# Patient Record
Sex: FEMALE | Race: BLACK OR AFRICAN AMERICAN | Employment: OTHER | URBAN - METROPOLITAN AREA
[De-identification: names, ages, dates, MRNs, and addresses within clinical notes are randomized per-mention and may not be internally consistent; named-entity substitution may affect disease eponyms.]

---

## 2017-04-28 ENCOUNTER — OFFICE VISIT (OUTPATIENT)
Dept: RHEUMATOLOGY | Age: 67
End: 2017-04-28

## 2017-04-28 VITALS
DIASTOLIC BLOOD PRESSURE: 78 MMHG | TEMPERATURE: 98.2 F | HEIGHT: 63 IN | RESPIRATION RATE: 14 BRPM | OXYGEN SATURATION: 99 % | SYSTOLIC BLOOD PRESSURE: 125 MMHG | BODY MASS INDEX: 28 KG/M2 | WEIGHT: 158 LBS | HEART RATE: 66 BPM

## 2017-04-28 DIAGNOSIS — M85.80 OSTEOPENIA, UNSPECIFIED LOCATION: Primary | ICD-10-CM

## 2017-04-28 DIAGNOSIS — M17.0 PRIMARY OSTEOARTHRITIS OF BOTH KNEES: ICD-10-CM

## 2017-04-28 NOTE — MR AVS SNAPSHOT
Visit Information Date & Time Provider Department Dept. Phone Encounter #  
 4/28/2017  1:00 PM Nayana Villatoro MD [facility] [id number] Follow-up Instructions Return in about 1 year (around 4/28/2018). Upcoming Health Maintenance Date Due Hepatitis C Screening 1950 DTaP/Tdap/Td series (1 - Tdap) 10/24/1973 PAP AKA CERVICAL CYTOLOGY 10/24/1973 FOBT Q 1 YEAR AGE 50-75 10/24/2002 ZOSTER VACCINE AGE 60> 10/24/2012 BREAST CANCER SCRN MAMMOGRAM 6/19/2017 Allergies as of 4/28/2017  Review Complete On: 4/28/2017 By: Nayana Villatoro MD  
  
 Severity Noted Reaction Type Reactions Lisinopril High 10/22/2010    Swelling, Cough Lips swell Pcn [Penicillins] High 10/22/2010    Hives Hydrochlorothiazide  10/22/2010    Unknown (comments) NA down, fatigue , myalgia Current Immunizations  Reviewed on 4/28/2017 Name Date Influenza Vaccine (Quad) PF 10/31/2016 Influenza Vaccine Split 10/8/2012  1:09 PM  
 Pneumococcal Vaccine (Unspecified Type) 10/8/2012  1:10 PM  
  
 Reviewed by Annetta Romero LPN on 0/72/9751 at  1:02 PM  
You Were Diagnosed With   
  
 Codes Comments Osteopenia, unspecified location    -  Primary ICD-10-CM: M85.80 ICD-9-CM: 733.90 Primary osteoarthritis of both knees     ICD-10-CM: M17.0 ICD-9-CM: 715.16 Vitals BP Pulse Temp Resp Height(growth percentile) Weight(growth percentile) 125/78 (BP 1 Location: Right arm, BP Patient Position: Sitting) 66 98.2 °F (36.8 °C) (Oral) 14 5' 3\" (1.6 m) 158 lb (71.7 kg) SpO2 BMI OB Status Smoking Status 99% 27.99 kg/m2 Postmenopausal Former Smoker BMI and BSA Data Body Mass Index Body Surface Area  
 27.99 kg/m 2 1.79 m 2 Preferred Pharmacy Pharmacy Name Phone [facility] [address] [phone] Your Updated Medication List  
  
   
 This list is accurate as of: 4/28/17  1:19 PM.  Always use your most recent med list.  
  
  
  
  
 albuterol 90 mcg/actuation inhaler Commonly known as:  PROVENTIL HFA, VENTOLIN HFA, PROAIR HFA Take 2 Puffs by inhalation every four (4) hours as needed for Wheezing or Shortness of Breath. alendronate 70 mg tablet Commonly known as:  FOSAMAX Take 1 Tab by mouth every seven (7) days. ATACAND 16 mg tablet Generic drug:  candesartan Take  by mouth daily. atenolol 50 mg tablet Commonly known as:  TENORMIN Take 50 mg by mouth two (2) times a day. calcium carbonate-vitamin D3 1,000 mg(2,500 mg)-800 unit Tab Take  by mouth daily. multivitamin tablet Commonly known as:  ONE A DAY Take 1 Tab by mouth daily. NORVASC 5 mg tablet Generic drug:  amLODIPine Take 5 mg by mouth daily. Follow-up Instructions Return in about 1 year (around 4/28/2018). Patient Instructions Continue alendronate 70 mg weekly Bone density next year Vitamin D3 1162-6667 units daily Introducing [facility]! [employer] introduces [employer] patient portal. Now you can access parts of your medical record, email your doctor's office, and request medication refills online. 1. In your internet browser, go to [email or web address] 2. Click on the First Time User? Click Here link in the Sign In box. You will see the New Member Sign Up page. 3. Enter your [employer] Access Code exactly as it appears below. You will not need to use this code after youve completed the sign-up process. If you do not sign up before the expiration date, you must request a new code. · [employer] Access Code: [email or web address] Expires: 7/27/2017  1:19 PM 
 
4. Enter the last four digits of your Social Security Number (xxxx) and Date of Birth (mm/dd/yyyy) as indicated and click Submit. You will be taken to the next sign-up page. 5. Create a [employer] ID. This will be your [employer] login ID and cannot be changed, so think of one that is secure and easy to remember. 6. Create a [employer] password. You can change your password at any time. 7. Enter your Password Reset Question and Answer. This can be used at a later time if you forget your password. 8. Enter your e-mail address. You will receive e-mail notification when new information is available in [address] 9. Click Sign Up. You can now view and download portions of your medical record. 10. Click the Download Summary menu link to download a portable copy of your medical information. If you have questions, please visit the Frequently Asked Questions section of the [employer] website. Remember, [employer] is NOT to be used for urgent needs. For medical emergencies, dial 911. Now available from your iPhone and Android! Please provide this summary of care documentation to your next provider. Your primary care clinician is listed as ZOË Saul. If you have any questions after today's visit, please call [phone].

## 2017-04-28 NOTE — PROGRESS NOTES
HISTORY OF PRESENT ILLNESS  Mitch Cat is a 61 y.o. female. HPI  Patient presents for follow up of arthritis and osteopenia. She has occasional right knee pain with walking. She is walking or using an exercise bicycle on most days. She uses a cane if needed for gait stability. She has some swelling of the knees towards day's end. . She has AM stiffness of a few minutes. She has not taken Celebrex since last visit. She takes Tylenol sparingly if needed. She has been taking alendronate 70 mg as directed without difficulty. She is taking calcium and D supplement, as well as a multivitamin, on most days. Current Outpatient Prescriptions   Medication Sig Dispense Refill    alendronate (FOSAMAX) 70 mg tablet Take 1 Tab by mouth every seven (7) days. 13 Tab 3    multivitamin (ONE A DAY) tablet Take 1 Tab by mouth daily.  calcium carbonate-vitamin D3 1,000 mg(2,500 mg)-800 unit Tab Take  by mouth daily.  atenolol (TENORMIN) 50 mg tablet Take 50 mg by mouth two (2) times a day.  candesartan (ATACAND) 16 mg tablet Take  by mouth daily.  amlodipine (NORVASC) 5 mg tablet Take 5 mg by mouth daily.  albuterol (PROVENTIL HFA, VENTOLIN HFA, PROAIR HFA) 90 mcg/actuation inhaler Take 2 Puffs by inhalation every four (4) hours as needed for Wheezing or Shortness of Breath. Allergies   Allergen Reactions    Lisinopril Swelling and Cough     Lips swell    Pcn [Penicillins] Hives    Hydrochlorothiazide Unknown (comments)     NA down, fatigue , myalgia       Review of Systems   Constitutional: Negative for fever. Cardiovascular: Negative for leg swelling. Gastrointestinal: Negative for abdominal pain and nausea. Skin: Negative for rash. Physical Exam   Vitals reviewed. GENERAL: well developed, well nourished, in no apparent distress;    O/P: clear; no areas exposed jaw bone.     CARDIOVASCULAR: no edema;    GASTROINTESTINAL: nontender; no organomegaly    MUSCULOSKELETAL: digits/nails: squaring, subluxation left first Aia 16.; squaring right first Aia 16. Each knee 100 degrees flexion. No synovitis or joint tenderness   SKIN: no rashes   PSYCHIATRIC: mental status: alert and oriented x 3; good insight and judgement;      Lab Results   Component Value Date/Time    WBC 6.4 10/31/2016 12:23 PM    HGB 12.0 10/31/2016 12:23 PM    HCT 37.2 10/31/2016 12:23 PM    PLATELET 623 63/91/5188 12:23 PM    MCV 87 10/31/2016 12:23 PM     Lab Results   Component Value Date/Time    Sodium 135 10/31/2016 12:23 PM    Potassium 4.4 10/31/2016 12:23 PM    Chloride 94 10/31/2016 12:23 PM    CO2 24 10/31/2016 12:23 PM    Anion gap 5 08/27/2016 03:15 AM    Glucose 90 10/31/2016 12:23 PM    BUN 14 10/31/2016 12:23 PM    Creatinine 0.93 10/31/2016 12:23 PM    BUN/Creatinine ratio 15 10/31/2016 12:23 PM    GFR est AA 75 10/31/2016 12:23 PM    GFR est non-AA 65 10/31/2016 12:23 PM    Calcium 9.9 10/31/2016 12:23 PM    Bilirubin, total 0.3 10/31/2016 12:23 PM    AST (SGOT) 16 10/31/2016 12:23 PM    Alk. phosphatase 99 10/31/2016 12:23 PM    Protein, total 7.3 10/31/2016 12:23 PM    Albumin 4.2 10/31/2016 12:23 PM    A-G Ratio 1.4 10/31/2016 12:23 PM    ALT (SGPT) 8 10/31/2016 12:23 PM         ASSESSMENT and PLAN    ICD-10-CM ICD-9-CM    1. Osteopenia: DXA in February 2013 with T-score femoral neck -2.3; DXA May 2015 with femoral neck -2.0: significant improvement in BMD noted between 2013 and May. No clear clinical fracture history. 25-D 15 at baseline. Currently taking a calcium and D supplement with a multivitamin daily. She has been taking and tolerating alendronate 70 mg weekly since 6/2013.   M85.80 733.90 -alendronate 70 mg weekly  -DXA next year  -calcium 1200 mg daily total  -vitamin D3 6287-7767 units daily   2. Primary osteoarthritis of both knees: regular and generally comfortable exercise noted. No significant use of Tylenol recently.  Has not needed Celebrex M17.0 715.16 -comfortable, low impact exercise encouraged  -weight loss encouraged and discussed

## 2017-10-03 RX ORDER — ALENDRONATE SODIUM 70 MG/1
70 TABLET ORAL
Qty: 13 TAB | Refills: 3 | Status: SHIPPED | OUTPATIENT
Start: 2017-10-03 | End: 2017-10-03 | Stop reason: SDUPTHER

## 2017-10-03 RX ORDER — ALENDRONATE SODIUM 70 MG/1
70 TABLET ORAL
Qty: 13 TAB | Refills: 3 | Status: SHIPPED | OUTPATIENT
Start: 2017-10-03 | End: 2019-05-28 | Stop reason: ALTCHOICE

## 2018-05-23 ENCOUNTER — HOSPITAL ENCOUNTER (OUTPATIENT)
Dept: LAB | Age: 68
Discharge: HOME | End: 2018-05-23
Payer: MEDICARE

## 2018-05-23 ENCOUNTER — OFFICE VISIT (OUTPATIENT)
Dept: FAMILY MEDICINE CLINIC | Age: 68
End: 2018-05-23

## 2018-05-23 VITALS
HEART RATE: 60 BPM | OXYGEN SATURATION: 98 % | TEMPERATURE: 97.9 F | DIASTOLIC BLOOD PRESSURE: 82 MMHG | RESPIRATION RATE: 18 BRPM | BODY MASS INDEX: 28.07 KG/M2 | SYSTOLIC BLOOD PRESSURE: 129 MMHG | WEIGHT: 158.4 LBS | HEIGHT: 63 IN

## 2018-05-23 DIAGNOSIS — Z12.39 BREAST CANCER SCREENING: ICD-10-CM

## 2018-05-23 DIAGNOSIS — D86.9 SARCOIDOSIS: ICD-10-CM

## 2018-05-23 DIAGNOSIS — Z76.89 ENCOUNTER TO ESTABLISH CARE: Primary | ICD-10-CM

## 2018-05-23 DIAGNOSIS — I10 ESSENTIAL HYPERTENSION: ICD-10-CM

## 2018-05-23 DIAGNOSIS — M85.80 OSTEOPENIA, UNSPECIFIED LOCATION: ICD-10-CM

## 2018-05-23 DIAGNOSIS — Z11.59 NEED FOR HEPATITIS C SCREENING TEST: ICD-10-CM

## 2018-05-23 LAB — HBA1C MFR BLD HPLC: 5.3 %

## 2018-05-23 PROCEDURE — 80053 COMPREHEN METABOLIC PANEL: CPT

## 2018-05-23 PROCEDURE — 36415 COLL VENOUS BLD VENIPUNCTURE: CPT

## 2018-05-23 PROCEDURE — 85025 COMPLETE CBC W/AUTO DIFF WBC: CPT

## 2018-05-23 PROCEDURE — 80061 LIPID PANEL: CPT

## 2018-05-23 PROCEDURE — 84443 ASSAY THYROID STIM HORMONE: CPT

## 2018-05-23 PROCEDURE — 86803 HEPATITIS C AB TEST: CPT

## 2018-05-23 RX ORDER — CANDESARTAN 16 MG/1
16 TABLET ORAL DAILY
Qty: 90 TAB | Refills: 2 | Status: SHIPPED | OUTPATIENT
Start: 2018-05-23 | End: 2019-02-22 | Stop reason: SDUPTHER

## 2018-05-23 RX ORDER — ATENOLOL 50 MG/1
50 TABLET ORAL DAILY
Qty: 90 TAB | Refills: 2 | Status: SHIPPED | OUTPATIENT
Start: 2018-05-23 | End: 2019-02-22 | Stop reason: SDUPTHER

## 2018-05-23 RX ORDER — AMLODIPINE BESYLATE 5 MG/1
5 TABLET ORAL DAILY
Qty: 90 TAB | Refills: 2 | Status: SHIPPED | OUTPATIENT
Start: 2018-05-23 | End: 2019-02-22 | Stop reason: SDUPTHER

## 2018-05-23 NOTE — PATIENT INSTRUCTIONS

## 2018-05-23 NOTE — PROGRESS NOTES
Chief Complaint   Patient presents with   [facility] Establish Care    Medication Refill       Pt overdue for wellness exam.    Pt stated she had an eye exam at Dr. Francisca Rojas february 2017. NADEEM signed. Pt states she Is due for mammogram in July 2018. Pt stated that she was going to be taken off fosamax. May require another bone density scan per pt.

## 2018-05-23 NOTE — PROGRESS NOTES
HISTORY OF PRESENT ILLNESS  Rakel Colin is a 74 y.o. female. HPI  Patient comes in today to establish care. Previous PCP - Evelyn Denton for 25 years.  was in [facility]. Hx pulmonary sarcoid. Was seeing Dr. Towana Koyanagi (pulmonary doctor). Saw him last year in September 2017. Would see every 6 months. Would have PFTs once yearly. Needs new pulmonologist.  Hx of osteopenia, Taking Fosamax, needed to follow up with Dr. Nguyễn Gage but was told he is out of office indefinitely. Needs updated DEXA scan. GYN provider. Was told she no longer needed paps after 2015. Patient had abnormal pap in 1980s. Colonoscopy in 2009 with Dr. Adam Vital. .  Does not recall results, recalls being told 10 year follow up  Hx HTN, states compliance with medications. Tries to follow low sodium diet. Denies chest pains, palpitations, dizziness, dyspnea, tingling in extremities. Urinating without difficulty. Allergies   Allergen Reactions    Lisinopril Swelling and Cough     Lips swell    Pcn [Penicillins] Hives    Hydrochlorothiazide Unknown (comments)     NA down, fatigue , myalgia       Past Medical History:   Diagnosis Date    Arthritis     OA    COPD     d/t sarcoidosis    Hypertension     Hyponatremia 2009    FOLLOWING TREATMENT WITH HCTZ; NOW RESOLVED 9/24/12    Osteopenia     Sarcoidosis     nonsymptomatic       Past Surgical History:   Procedure Laterality Date    BONE MARROW ASPIRATE &BIOPSY  1979    SARCOIDOSIS DIAGNOSED    HX DILATION AND CURETTAGE  1992    HX HIP REPLACEMENT  2012    right    HX HIP REPLACEMENT Left 08/26/2016    HX ORTHOPAEDIC      excision right wrist cyst    HX OTHER SURGICAL  1979    BRONCHOSCOPY    HX TUBAL LIGATION  1988       Social History     Social History    Marital status:      Spouse name: N/A    Number of children: N/A    Years of education: N/A     Occupational History    Not on file.      Social History Main Topics    Smoking status: Former Smoker     Packs/day: 0.50     Years: 30.00     Quit date: 5/4/2012    Smokeless tobacco: Never Used    Alcohol use 4.2 - 6.0 oz/week     7 - 10 Cans of beer per week      Comment: 3-4 beers daily, may have 6 beers on weekends    Drug use: No    Sexual activity: No     Other Topics Concern    Not on file     Social History Narrative    Retired from [employer]. . 3 children, 2 sons, 1 daughter       Family History   Problem Relation Age of Onset   26 [facility] Arthritis-rheumatoid Mother     Hypertension Mother     Elevated Lipids Mother     Cancer Mother      bladder    Stroke Mother     Elevated Lipids Sister     Hypertension Sister     Diabetes Sister     Elevated Lipids Sister     Hypertension Sister     Other Son      SARCOIDOSIS, KERATOCONUS    Other Daughter      KERATOCONUS    Anesth Problems Neg Hx        Current Outpatient Prescriptions   Medication Sig    atenolol (TENORMIN) 50 mg tablet Take 1 Tab by mouth daily.  candesartan (ATACAND) 16 mg tablet Take 1 Tab by mouth daily.  amLODIPine (NORVASC) 5 mg tablet Take 1 Tab by mouth daily.  alendronate (FOSAMAX) 70 mg tablet Take 1 Tab by mouth every seven (7) days.  multivitamin (ONE A DAY) tablet Take 1 Tab by mouth daily.  calcium carbonate-vitamin D3 1,000 mg(2,500 mg)-800 unit Tab Take  by mouth daily.  albuterol (PROVENTIL HFA, VENTOLIN HFA, PROAIR HFA) 90 mcg/actuation inhaler Take 2 Puffs by inhalation every four (4) hours as needed for Wheezing or Shortness of Breath. No current facility-administered medications for this visit. Review of Systems   Constitutional: Negative for chills, diaphoresis, fever, malaise/fatigue and weight loss. HENT: Negative for congestion, ear pain, sore throat and tinnitus. Eyes: Negative for blurred vision and double vision. Respiratory: Negative for cough, sputum production, shortness of breath and wheezing. Cardiovascular: Negative for chest pain, palpitations and leg swelling. Gastrointestinal: Negative for abdominal pain, blood in stool, constipation, diarrhea, nausea and vomiting. Genitourinary: Negative for dysuria, flank pain, frequency, hematuria and urgency. Musculoskeletal: Negative for back pain, joint pain and myalgias. Skin: Negative. Neurological: Negative for dizziness, tingling, sensory change, speech change, focal weakness and headaches. Psychiatric/Behavioral: Negative for depression. The patient is not nervous/anxious and does not have insomnia. Vitals:    05/23/18 0937   BP: 129/82   Pulse: 60   Resp: 18   Temp: 97.9 °F (36.6 °C)   TempSrc: Oral   SpO2: 98%   Weight: 158 lb 6.4 oz (71.8 kg)   Height: 5' 3\" (1.6 m)     Physical Exam   Constitutional: She is oriented to person, place, and time. Vital signs are normal. She appears well-developed and well-nourished. She is cooperative. HENT:   Right Ear: Hearing, tympanic membrane, external ear and ear canal normal.   Left Ear: Hearing, tympanic membrane, external ear and ear canal normal.   Nose: Nose normal. Right sinus exhibits no maxillary sinus tenderness and no frontal sinus tenderness. Left sinus exhibits no maxillary sinus tenderness and no frontal sinus tenderness. Mouth/Throat: Uvula is midline, oropharynx is clear and moist and mucous membranes are normal. Mucous membranes are not pale and not dry. No oropharyngeal exudate, posterior oropharyngeal edema or posterior oropharyngeal erythema. Neck: No thyroid mass and no thyromegaly present. Cardiovascular: Normal rate, regular rhythm, S1 normal, S2 normal and normal heart sounds. No murmur heard. Pulses:       Radial pulses are 2+ on the right side, and 2+ on the left side. Dorsalis pedis pulses are 2+ on the right side, and 2+ on the left side. Posterior tibial pulses are 2+ on the right side, and 2+ on the left side. Pulmonary/Chest: Effort normal and breath sounds normal. She has no decreased breath sounds.  She has no wheezes. She has no rhonchi. She has no rales. Abdominal: Soft. Normal appearance and bowel sounds are normal. There is no hepatosplenomegaly. There is no tenderness. There is no CVA tenderness. Lymphadenopathy:        Head (right side): No submental, no submandibular, no tonsillar, no preauricular and no posterior auricular adenopathy present. Head (left side): No submental, no submandibular, no tonsillar, no preauricular and no posterior auricular adenopathy present. She has no cervical adenopathy. Right: No supraclavicular adenopathy present. Left: No supraclavicular adenopathy present. Neurological: She is alert and oriented to person, place, and time. Skin: Skin is warm, dry and intact. Psychiatric: She has a normal mood and affect. Her speech is normal and behavior is normal. Thought content normal.   Vitals reviewed. ASSESSMENT and PLAN    ICD-10-CM ICD-9-CM    1. Encounter to establish care Z76.89 V65.8    2. Essential hypertension I10 401.9 atenolol (TENORMIN) 50 mg tablet      candesartan (ATACAND) 16 mg tablet      amLODIPine (NORVASC) 5 mg tablet      CBC WITH AUTOMATED DIFF      METABOLIC PANEL, COMPREHENSIVE      LIPID PANEL      TSH RFX ON ABNORMAL TO FREE T4      AMB POC HEMOGLOBIN A1C   3. Sarcoidosis,lung D86.9 135 REFERRAL TO PULMONARY DISEASE   4. Osteopenia, unspecified location M85.80 733.90 REFERRAL TO RHEUMATOLOGY   5. Breast cancer screening Z12.31 V76.10 BREANN MAMMO BI SCREENING INCL CAD   6. Need for hepatitis C screening test Z11.59 V73.89 HEPATITIS C AB     Encounter Diagnoses   Name Primary?     Encounter to establish care Yes    Essential hypertension     Sarcoidosis,lung     Osteopenia, unspecified location     Breast cancer screening     Need for hepatitis C screening test      Orders Placed This Encounter    BREANN MAMMO BI SCREENING INCL CAD    HEPATITIS C AB    CBC WITH AUTOMATED DIFF    METABOLIC PANEL, COMPREHENSIVE    LIPID PANEL    TSH RFX ON ABNORMAL TO FREE T4    REFERRAL TO PULMONARY DISEASE    REFERRAL TO RHEUMATOLOGY    AMB POC HEMOGLOBIN A1C    atenolol (TENORMIN) 50 mg tablet    candesartan (ATACAND) 16 mg tablet    amLODIPine (NORVASC) 5 mg tablet     Diagnoses and all orders for this visit:    1. Encounter to establish care    2. Essential hypertension - stable. Refilled medications. Continue to follow low sodium diet. -     atenolol (TENORMIN) 50 mg tablet; Take 1 Tab by mouth daily. -     candesartan (ATACAND) 16 mg tablet; Take 1 Tab by mouth daily. -     amLODIPine (NORVASC) 5 mg tablet; Take 1 Tab by mouth daily.  -     CBC WITH AUTOMATED DIFF  -     METABOLIC PANEL, COMPREHENSIVE  -     LIPID PANEL  -     TSH RFX ON ABNORMAL TO FREE T4  -     AMB POC HEMOGLOBIN A1C    3. Sarcoidosis,lung - stable, refer to pulmonary  -     REFERRAL TO PULMONARY DISEASE    4. Osteopenia, unspecified location - on fosamax, followed by rheumatology  -     REFERRAL TO RHEUMATOLOGY    5. Breast cancer screening  -     [facility] MAMMO BI SCREENING INCL CAD; Future    6. Need for hepatitis C screening test  -     HEPATITIS C AB      Follow-up Disposition:  Return in about 6 months (around 11/23/2018). lab results and schedule of future lab studies reviewed with patient  reviewed diet, exercise and weight control    I have reviewed the patient's allergies and made any necessary changes. Medical, procedural, social and family histories have been reviewed and updated as medically indicated. I have reconciled and/or revised patient medications in the EMR. I have discussed each diagnosis listed in this note with Rafia Duncan and/or their family. I have discussed treatment options and the risk/benefit analysis of those options, including safe use of medications and possible medication side effects. Through the use of shared decision making we have agreed to the above plan.       The patient has received an after-visit summary and questions were answered concerning future plans. Denisse Hammer, FNP-C    This note will not be viewable in [employer].

## 2018-05-23 NOTE — MR AVS SNAPSHOT
[address] [facility] 
 
 
 [address] [facility] [place] [address] [address] 
[phone] Patient: Elliott Maurice MRN: [id number] :1950 Visit Information Date & Time Provider Department Dept. Phone Encounter #  
 2018  9:30 AM Truman Osler, NP [facility] [phone] [id number] Follow-up Instructions Return in about 6 months (around 2018). Upcoming Health Maintenance Date Due Hepatitis C Screening 1950 DTaP/Tdap/Td series (1 - Tdap) 10/24/1973 FOBT Q 1 YEAR AGE 50-75 10/24/2002 ZOSTER VACCINE AGE 60> 2012 BREAST CANCER SCRN MAMMOGRAM 2017 GLAUCOMA SCREENING Q2Y 10/24/2017 Pneumococcal 65+ Low/Medium Risk (2 of 2 - PPSV23) 10/24/2017 MEDICARE YEARLY EXAM 2018 Influenza Age 5 to Adult 2018 Allergies as of 2018  Review Complete On: 2018 By: Ck Mays LPN Severity Noted Reaction Type Reactions Lisinopril High 10/22/2010    Swelling, Cough Lips swell Pcn [Penicillins] High 10/22/2010    Hives Hydrochlorothiazide  10/22/2010    Unknown (comments) NA down, fatigue , myalgia Current Immunizations  Reviewed on 2017 Name Date Influenza Vaccine (Quad) PF 10/31/2016 Influenza Vaccine Split 10/8/2012  1:09 PM  
 ZZZ-RETIRED (DO NOT USE) Pneumococcal Vaccine (Unspecified Type) 10/8/2012  1:10 PM  
  
 Not reviewed this visit You Were Diagnosed With   
  
 Codes Comments Essential hypertension    -  Primary ICD-10-CM: I10 
ICD-9-CM: 401.9 Sarcoidosis     ICD-10-CM: D86.9 ICD-9-CM: 135 Osteopenia, unspecified location     ICD-10-CM: M85.80 ICD-9-CM: 733.90 Encounter to establish care     ICD-10-CM: Z76.89 
ICD-9-CM: V65.8 Breast cancer screening     ICD-10-CM: Z12.31 
ICD-9-CM: V76.10 Need for hepatitis C screening test     ICD-10-CM: Z11.59 
ICD-9-CM: V73.89 Vitals BP Pulse Temp Resp Height(growth percentile) Weight(growth percentile) 129/82 (BP 1 Location: Left arm, BP Patient Position: Sitting) 60 97.9 °F (36.6 °C) (Oral) 18 5' 3\" (1.6 m) 158 lb 6.4 oz (71.8 kg) SpO2 BMI OB Status Smoking Status 98% 28.06 kg/m2 Postmenopausal Former Smoker BMI and BSA Data Body Mass Index Body Surface Area 28.06 kg/m 2 1.79 m 2 Preferred Pharmacy Pharmacy Name Phone [facility] [address] [facility] [phone] Your Updated Medication List  
  
   
This list is accurate as of 5/23/18 10:41 AM.  Always use your most recent med list.  
  
  
  
  
 albuterol 90 mcg/actuation inhaler Commonly known as:  PROVENTIL HFA, VENTOLIN HFA, PROAIR HFA Take 2 Puffs by inhalation every four (4) hours as needed for Wheezing or Shortness of Breath. alendronate 70 mg tablet Commonly known as:  FOSAMAX Take 1 Tab by mouth every seven (7) days. amLODIPine 5 mg tablet Commonly known as:  Sj Conchsi Take 1 Tab by mouth daily. atenolol 50 mg tablet Commonly known as:  TENORMIN Take 1 Tab by mouth daily. calcium carbonate-vitamin D3 1,000 mg(2,500 mg)-800 unit Tab Take  by mouth daily. candesartan 16 mg tablet Commonly known as:  ATACAND Take 1 Tab by mouth daily. multivitamin tablet Commonly known as:  ONE A DAY Take 1 Tab by mouth daily. Prescriptions Sent to Pharmacy Refills  
 atenolol (TENORMIN) 50 mg tablet 2 Sig: Take 1 Tab by mouth daily. Class: Normal  
 Pharmacy: [facility] [address] [facility] [address] [place] [address] RD AT [address] [facility] Ph #: [phone] Route: Oral  
 candesartan (ATACAND) 16 mg tablet 2 Sig: Take 1 Tab by mouth daily.   
 Class: Normal  
 Pharmacy: [address] [facility] AT [address] [facility] Ph #: [phone] Route: Oral  
 amLODIPine (NORVASC) 5 mg tablet 2 Sig: Take 1 Tab by mouth daily. Class: Normal  
 Pharmacy: [facility] [address] [facility] Ph #: [phone] Route: Oral  
  
We Performed the Following AMB POC HEMOGLOBIN A1C [11001 CPT(R)] CBC WITH AUTOMATED DIFF [73146 CPT(R)] HEPATITIS C AB [65458 CPT(R)] LIPID PANEL [80046 CPT(R)] METABOLIC PANEL, COMPREHENSIVE [20071 CPT(R)] REFERRAL TO PULMONARY DISEASE [PDQ64 Custom] Comments: Hx of sarcoidosis. Would like pulmonary associates in [facility]. REFERRAL TO RHEUMATOLOGY [XBC93 Custom] Comments:  
 Osteoporosis, used to see Dr. Demario Cardenas, not sure if still in practice. If so schedule with him, otherwise set up with a partner or new provider. TSH RFX ON ABNORMAL TO FREE T4 [[id number] Custom] Follow-up Instructions Return in about 6 months (around 11/23/2018). To-Do List   
 05/23/2018 Imaging:  BREANN MAMMO BI SCREENING INCL CAD Referral Information Referral ID Referred By Referred To  
  
 [id number] Whitney THAKKAR Not Available Visits Status Start Date End Date 1 New Request 5/23/18 5/23/19 If your referral has a status of pending review or denied, additional information will be sent to support the outcome of this decision. Referral ID Referred By Referred To  
 [id number] Whitney THAKKAR Not Available Visits Status Start Date End Date 1 New Request 5/23/18 5/23/19 If your referral has a status of pending review or denied, additional information will be sent to support the outcome of this decision. Patient Instructions Eating Healthy Foods: Care Instructions Your Care Instructions Eating healthy foods can help lower your risk for disease.  Healthy food gives you energy and keeps your heart strong, your brain active, your muscles working, and your bones strong. A healthy diet includes a variety of foods from the basic food groups: grains, vegetables, fruits, milk and milk products, and meat and beans. Some people may eat more of their favorite foods from only one food group and, as a result, miss getting the nutrients they need. So, it is important to pay attention not only to what you eat but also to what you are missing from your diet. You can eat a healthy, balanced diet by making a few small changes. Follow-up care is a key part of your treatment and safety. Be sure to make and go to all appointments, and call your doctor if you are having problems. It's also a good idea to know your test results and keep a list of the medicines you take. How can you care for yourself at home? Look at what you eat · Keep a food diary for a week or two and record everything you eat or drink. Track the number of servings you eat from each food group. · For a balanced diet every day, eat a variety of: ¨ 6 or more ounce-equivalents of grains, such as cereals, breads, crackers, rice, or pasta, every day. An ounce-equivalent is 1 slice of bread, 1 cup of ready-to-eat cereal, or ½ cup of cooked rice, cooked pasta, or cooked cereal. 
¨ 2½ cups of vegetables, especially: § Dark-green vegetables such as broccoli and spinach. § Orange vegetables such as carrots and sweet potatoes. § Dry beans (such as agarwal and kidney beans) and peas (such as lentils). ¨ 2 cups of fresh, frozen, or canned fruit. A small apple or 1 banana or orange equals 1 cup. ¨ 3 cups of nonfat or low-fat milk, yogurt, or other milk products. ¨ 5½ ounces of meat and beans, such as chicken, fish, lean meat, beans, nuts, and seeds. One egg, 1 tablespoon of peanut butter, ½ ounce nuts or seeds, or ¼ cup of cooked beans equals 1 ounce of meat. · Learn how to read food labels for serving sizes and ingredients.  Fast-food and convenience-food meals often contain few or no fruits or vegetables. Make sure you eat some fruits and vegetables to make the meal more nutritious. · Look at your food diary. For each food group, add up what you have eaten and then divide the total by the number of days. This will give you an idea of how much you are eating from each food group. See if you can find some ways to change your diet to make it more healthy. Start small · Do not try to make dramatic changes to your diet all at once. You might feel that you are missing out on your favorite foods and then be more likely to fail. · Start slowly, and gradually change your habits. Try some of the following: ¨ Use whole wheat bread instead of white bread. ¨ Use nonfat or low-fat milk instead of whole milk. ¨ Eat brown rice instead of white rice, and eat whole wheat pasta instead of white-flour pasta. ¨ Try low-fat cheeses and low-fat yogurt. ¨ Add more fruits and vegetables to meals and have them for snacks. ¨ Add lettuce, tomato, cucumber, and onion to sandwiches. ¨ Add fruit to yogurt and cereal. 
Enjoy food · You can still eat your favorite foods. You just may need to eat less of them. If your favorite foods are high in fat, salt, and sugar, limit how often you eat them, but do not cut them out entirely. · Eat a wide variety of foods. Make healthy choices when eating out · The type of restaurant you choose can help you make healthy choices. Even fast-food chains are now offering more low-fat or healthier choices on the menu. · Choose smaller portions, or take half of your meal home. · When eating out, try: ¨ A veggie pizza with a whole wheat crust or grilled chicken (instead of sausage or pepperoni). ¨ Pasta with roasted vegetables, grilled chicken, or marinara sauce instead of cream sauce. ¨ A vegetable wrap or grilled chicken wrap. ¨ Broiled or poached food instead of fried or breaded items. Make healthy choices easy · Buy packaged, prewashed, ready-to-eat fresh vegetables and fruits, such as baby carrots, salad mixes, and chopped or shredded broccoli and cauliflower. · Buy packaged, presliced fruits, such as melon or pineapple. · Choose 100% fruit or vegetable juice instead of soda. Limit juice intake to 4 to 6 oz (½ to ¾ cup) a day. · Blend low-fat yogurt, fruit juice, and canned or frozen fruit to make a smoothie for breakfast or a snack. Where can you learn more? Go to http://janeth-hailey.info/. Enter T756 in the search box to learn more about \"Eating Healthy Foods: Care Instructions. \" Current as of: May 12, 2017 Content Version: 11.4 © 1872-4062 [employer]. Care instructions adapted under license by [employer] (which disclaims liability or warranty for this information). If you have questions about a medical condition or this instruction, always ask your healthcare professional. [place] [address] any warranty or liability for your use of this information. Introducing [facility] & HEALTH SERVICES! [employer] introduces [employer] patient portal. Now you can access parts of your medical record, email your doctor's office, and request medication refills online. 1. In your internet browser, go to [email or web address] 2. Click on the First Time User? Click Here link in the Sign In box. You will see the New Member Sign Up page. 3. Enter your [employer] Access Code exactly as it appears below. You will not need to use this code after youve completed the sign-up process. If you do not sign up before the expiration date, you must request a new code. · [employer] Access Code: [email or web address] Expires: 8/21/2018  9:05 AM 
 
4. Enter the last four digits of your Social Security Number (xxxx) and Date of Birth (mm/dd/yyyy) as indicated and click Submit. You will be taken to the next sign-up page. 5. Create a [employer] ID.  This will be your [employer] login ID and cannot be changed, so think of one that is secure and easy to remember. 6. Create a CouchOne password. You can change your password at any time. 7. Enter your Password Reset Question and Answer. This can be used at a later time if you forget your password. 8. Enter your e-mail address. You will receive e-mail notification when new information is available in [address] 9. Click Sign Up. You can now view and download portions of your medical record. 10. Click the Download Summary menu link to download a portable copy of your medical information. If you have questions, please visit the Frequently Asked Questions section of the [employer] website. Remember, [employer]One is NOT to be used for urgent needs. For medical emergencies, dial 911. Now available from your iPhone and Android! Please provide this summary of care documentation to your next provider. Your primary care clinician is listed as Via Gaye Mcdonald. If you have any questions after today's visit, please call [phone].

## 2018-05-24 LAB
ALBUMIN SERPL-MCNC: 4.7 G/DL (ref 3.6–4.8)
ALBUMIN/GLOB SERPL: 1.6 {RATIO} (ref 1.2–2.2)
ALP SERPL-CCNC: 82 IU/L (ref 39–117)
ALT SERPL-CCNC: 8 IU/L (ref 0–32)
AST SERPL-CCNC: 19 IU/L (ref 0–40)
BASOPHILS # BLD AUTO: 0 X10E3/UL (ref 0–0.2)
BASOPHILS NFR BLD AUTO: 0 %
BILIRUB SERPL-MCNC: 0.4 MG/DL (ref 0–1.2)
BUN SERPL-MCNC: 10 MG/DL (ref 8–27)
BUN/CREAT SERPL: 11 (ref 12–28)
CALCIUM SERPL-MCNC: 9.6 MG/DL (ref 8.7–10.3)
CHLORIDE SERPL-SCNC: 98 MMOL/L (ref 96–106)
CHOLEST SERPL-MCNC: 203 MG/DL (ref 100–199)
CO2 SERPL-SCNC: 23 MMOL/L (ref 18–29)
CREAT SERPL-MCNC: 0.93 MG/DL (ref 0.57–1)
EOSINOPHIL # BLD AUTO: 0.2 X10E3/UL (ref 0–0.4)
EOSINOPHIL NFR BLD AUTO: 3 %
ERYTHROCYTE [DISTWIDTH] IN BLOOD BY AUTOMATED COUNT: 13.3 % (ref 12.3–15.4)
GFR SERPLBLD CREATININE-BSD FMLA CKD-EPI: 65 ML/MIN/1.73
GFR SERPLBLD CREATININE-BSD FMLA CKD-EPI: 75 ML/MIN/1.73
GLOBULIN SER CALC-MCNC: 2.9 G/DL (ref 1.5–4.5)
GLUCOSE SERPL-MCNC: 94 MG/DL (ref 65–99)
HCT VFR BLD AUTO: 36.6 % (ref 34–46.6)
HCV AB S/CO SERPL IA: <0.1 S/CO RATIO (ref 0–0.9)
HDLC SERPL-MCNC: 99 MG/DL
HGB BLD-MCNC: 11.9 G/DL (ref 11.1–15.9)
IMM GRANULOCYTES # BLD: 0 X10E3/UL (ref 0–0.1)
IMM GRANULOCYTES NFR BLD: 0 %
INTERPRETATION, 910389: NORMAL
LDLC SERPL CALC-MCNC: 82 MG/DL (ref 0–99)
LYMPHOCYTES # BLD AUTO: 1.5 X10E3/UL (ref 0.7–3.1)
LYMPHOCYTES NFR BLD AUTO: 26 %
MCH RBC QN AUTO: 29.3 PG (ref 26.6–33)
MCHC RBC AUTO-ENTMCNC: 32.5 G/DL (ref 31.5–35.7)
MCV RBC AUTO: 90 FL (ref 79–97)
MONOCYTES # BLD AUTO: 0.5 X10E3/UL (ref 0.1–0.9)
MONOCYTES NFR BLD AUTO: 9 %
NEUTROPHILS # BLD AUTO: 3.5 X10E3/UL (ref 1.4–7)
NEUTROPHILS NFR BLD AUTO: 62 %
PLATELET # BLD AUTO: 331 X10E3/UL (ref 150–379)
POTASSIUM SERPL-SCNC: 4.2 MMOL/L (ref 3.5–5.2)
PROT SERPL-MCNC: 7.6 G/DL (ref 6–8.5)
RBC # BLD AUTO: 4.06 X10E6/UL (ref 3.77–5.28)
SODIUM SERPL-SCNC: 139 MMOL/L (ref 134–144)
TRIGL SERPL-MCNC: 111 MG/DL (ref 0–149)
TSH SERPL DL<=0.005 MIU/L-ACNC: 2.83 UIU/ML (ref 0.45–4.5)
VLDLC SERPL CALC-MCNC: 22 MG/DL (ref 5–40)
WBC # BLD AUTO: 5.7 X10E3/UL (ref 3.4–10.8)

## 2018-06-06 ENCOUNTER — OFFICE VISIT (OUTPATIENT)
Dept: RHEUMATOLOGY | Age: 68
End: 2018-06-06

## 2018-06-06 VITALS
SYSTOLIC BLOOD PRESSURE: 144 MMHG | RESPIRATION RATE: 20 BRPM | BODY MASS INDEX: 27.82 KG/M2 | DIASTOLIC BLOOD PRESSURE: 83 MMHG | TEMPERATURE: 98.5 F | HEIGHT: 63 IN | HEART RATE: 60 BPM | OXYGEN SATURATION: 98 % | WEIGHT: 157 LBS

## 2018-06-06 DIAGNOSIS — E55.9 VITAMIN D DEFICIENCY: ICD-10-CM

## 2018-06-06 DIAGNOSIS — M85.89 OSTEOPENIA OF MULTIPLE SITES: Primary | ICD-10-CM

## 2018-06-06 NOTE — MR AVS SNAPSHOT
[address] 
[phone] Patient: Kalina Clayton MRN: [id number] :1950 Visit Information Date & Time Provider Department Dept. Phone Encounter #  
 2018 11:30 AM Annette Darnell PA-C [address] [place] [address] Road [facility] [id number] Follow-up Instructions Return in about 1 year (around 2019). Your Appointments 2018 11:00 AM  
ACUTE CARE with Cindy Lima NP [facility] [address]) Appt Note: 6 m follow up  
 [address] [place] [id number] [phone] [address] Upcoming Health Maintenance Date Due DTaP/Tdap/Td series (1 - Tdap) 10/24/1973 FOBT Q 1 YEAR AGE 50-75 10/24/2002 ZOSTER VACCINE AGE 60> 2012 BREAST CANCER SCRN MAMMOGRAM 2017 GLAUCOMA SCREENING Q2Y 10/24/2017 Pneumococcal 65+ Low/Medium Risk (2 of 2 - PPSV23) 10/24/2017 MEDICARE YEARLY EXAM 2018 Influenza Age 5 to Adult 2018 Allergies as of 2018  Review Complete On: 2018 By: Go Gallagher PA-C Severity Noted Reaction Type Reactions Lisinopril High 10/22/2010    Swelling, Cough Lips swell Pcn [Penicillins] High 10/22/2010    Hives Hydrochlorothiazide  10/22/2010    Unknown (comments) NA down, fatigue , myalgia Current Immunizations  Reviewed on 2017 Name Date Influenza Vaccine (Quad) PF 10/31/2016 Influenza Vaccine Split 10/8/2012  1:09 PM  
 ZZZ-RETIRED (DO NOT USE) Pneumococcal Vaccine (Unspecified Type) 10/8/2012  1:10 PM  
  
 Not reviewed this visit You Were Diagnosed With   
  
 Codes Comments Osteopenia of multiple sites    -  Primary ICD-10-CM: M85.89 ICD-9-CM: 733.90 Vitamin D deficiency     ICD-10-CM: E55.9 ICD-9-CM: 268.9 Vitals BP Pulse Temp Resp Height(growth percentile) Weight(growth percentile) 144/83 (BP 1 Location: Left arm, BP Patient Position: Sitting) 60 98.5 °F (36.9 °C) (Oral) 20 5' 3\" (1.6 m) 157 lb (71.2 kg) SpO2 BMI OB Status Smoking Status 98% 27.81 kg/m2 Postmenopausal Former Smoker Vitals History BMI and BSA Data Body Mass Index Body Surface Area  
 27.81 kg/m 2 1.78 m 2 Preferred Pharmacy Pharmacy Name Phone [facility] [address] [facility] [phone] Your Updated Medication List  
  
   
This list is accurate as of 6/6/18 12:31 PM.  Always use your most recent med list.  
  
  
  
  
 albuterol 90 mcg/actuation inhaler Commonly known as:  PROVENTIL HFA, VENTOLIN HFA, PROAIR HFA Take 2 Puffs by inhalation every four (4) hours as needed for Wheezing or Shortness of Breath. alendronate 70 mg tablet Commonly known as:  FOSAMAX Take 1 Tab by mouth every seven (7) days. amLODIPine 5 mg tablet Commonly known as:  [place] Take 1 Tab by mouth daily. atenolol 50 mg tablet Commonly known as:  TENORMIN Take 1 Tab by mouth daily. calcium carbonate-vitamin D3 1,000 mg(2,500 mg)-800 unit Tab Take  by mouth daily. candesartan 16 mg tablet Commonly known as:  ATACAND Take 1 Tab by mouth daily. multivitamin tablet Commonly known as:  ONE A DAY Take 1 Tab by mouth daily. We Performed the Following PROTEIN ELECTROPHORESIS W/ REFLX TRE [[id number] Custom] PTH INTACT [88715 CPT(R)] VITAMIN D, 25 HYDROXY [id number] CPT(R)] Follow-up Instructions Return in about 1 year (around 6/6/2019). To-Do List   
 06/06/2018 Imaging:  DEXA BONE DENSITY STUDY AXIAL   
  
 07/16/2018 9:50 AM  
  Appointment with Pending sale to [facility] 1 at [facility] ([phone]) Shower or bathe using soap and water. Do not use deodorant, powder, perfumes, or lotion the day of your exam.  If your prior mammograms were not performed at [place] [address] please bring films with you or forward prior images 2 days before your procedure. Check in at registration 15min before your appointment time unless you were instructed to do otherwise. A script is not necessary, but if you have one, please bring it on the day of the mammogram or have it faxed to the department. You are responsible for finding a method of transportation to your appointment. If you don't have transportation, please reschedule your appointment at least 24 hours in advance. [facility] [phone] [address] [place] [address]  [phone] [address]  [phone] [facility] [phone] [address] [phone] Patient Instructions Osteoporosis/Osteopenia Management I recommend you take at least 1200 mg of elemental calcium daily (one 600 mg tablet in the morning and one in the afternoon/evening) and/or consume dietary calcium in addition to oral to at least 1200 mg. 
 
I recommend at least 800 IU of vitamin D daily. I will check your vitamin D level today, and if it is low, I will prescribe you a weekly supplement called ergocalciferol 50,000. I recommend that you perform physical exercise daily for at least 30 minutes with low intensity resistance training with lightweights or rubber tension ropes (Thera-Band). Keep up the good work! Please call the Patient Care Scheduling Team [phone] to schedule your bone density test if you do not hear from them in 24 hours. Stop the alendronate since you have been on that for 5 years now. If bone density test still shows the need for treatment, I will switch you to Prolia, an injection administered at the Out Patient [facility] once every 6 months. Denosumab (By injection) Denosumab ([id number]) Treats osteoporosis, bone cancer, hypercalcemia, and other bone problems in patients who have cancer. Brand Name(s): Fernanda Branch There may be other brand names for this medicine. When This Medicine Should Not Be Used: This medicine is not right for everyone. You should not receive it if you had an allergic reaction to denosumab or if you are pregnant. How to Use This Medicine:  
Injectable · A doctor or other health professional will give you this medicine. This medicine is usually given as a shot under the skin of your upper arm, upper thigh, or stomach. · This medicine should come with a Medication Guide. Ask your pharmacist for a copy if you do not have one. · Missed dose: Call your doctor or pharmacist for instructions. Drugs and Foods to Avoid: Ask your doctor or pharmacist before using any other medicine, including over-the-counter medicines, vitamins, and herbal products. · Do not use Prolia® and Xgeva® together. They contain the same medicine. · Some medicines can affect how denosumab works. Tell your doctor if you are also using medicine that weakens your immune system, including a steroid or cancer medicine. Warnings While Using This Medicine: · This medicine may cause birth defects if either partner is using it during conception or pregnancy. Tell your doctor right away if you or your partner becomes pregnant. Use an effective form of birth control. Women who are being treated with Mary Boyd should continue using birth control for at least 5 months after the last dose. · Tell your doctor if you are breastfeeding, or if you have kidney disease, diabetes, gum disease, or an allergy to latex. Tell your doctor if you have problems with your thyroid, parathyroid, or digestive system. · This medicine may cause the following problems: 
¨ Low calcium levels in your blood ¨ Increased risk of broken thigh bone ¨ Increased risk of infections ¨ Serious skin reactions ¨ Severe bone, joint, or muscle pain · This medicine can cause jaw problems. You must have regular dental exams while you are being treated with this medicine. Tell your dentist that you are using this medicine. Practice good oral hygiene. · Do not suddenly stop using Prolia® without checking first with your doctor. Doing so may increase risk for more fractures. Talk to your doctor about other medicine that you can take. · Your doctor will do lab tests at regular visits to check on the effects of this medicine. Keep all appointments. Possible Side Effects While Using This Medicine:  
Call your doctor right away if you notice any of these side effects: · Allergic reaction: Itching or hives, swelling in your face or hands, swelling or tingling in your mouth or throat, chest tightness, trouble breathing · Blistering, peeling, red skin rash · Chest pain, fast or uneven heartbeat, trouble breathing · Fever, chills, cough, sore throat, body aches · Lightheadedness, dizziness, fainting · Muscle spasms or twitching, numbness or tingling in your fingers, toes, or lips · Pain or burning during urination, change in how much or how often you urinate · Pain, swelling, heavy feeling, or numbness in your mouth or jaw, loose teeth or other teeth problems · Severe bone, joint, or muscle pain · Unusual pain in your thigh, groin, or hip If you notice these less serious side effects, talk with your doctor: · Diarrhea, nausea · Redness, pain, itching, burning, swelling, or a lump under your skin where the shot was given · Tiredness or weakness If you notice other side effects that you think are caused by this medicine, tell your doctor. Call your doctor for medical advice about side effects. You may report side effects to FDA at 0-606-FDA-1093 © 2017 [address] Information is for End User's use only and may not be sold, redistributed or otherwise used for commercial purposes. The above information is an  only. It is not intended as medical advice for individual conditions or treatments. Talk to your doctor, nurse or pharmacist before following any medical regimen to see if it is safe and effective for you. Introducing [facility] & HEALTH SERVICES! [employer] introduces [employer] patient portal. Now you can access parts of your medical record, email your doctor's office, and request medication refills online. 1. In your internet browser, go to [email or web address] 2. Click on the First Time User? Click Here link in the Sign In box. You will see the New Member Sign Up page. 3. Enter your [employer] Access Code exactly as it appears below. You will not need to use this code after youve completed the sign-up process. If you do not sign up before the expiration date, you must request a new code. · [employer] Access Code: [email or web address] Expires: 8/21/2018  9:05 AM 
 
4. Enter the last four digits of your Social Security Number (xxxx) and Date of Birth (mm/dd/yyyy) as indicated and click Submit. You will be taken to the next sign-up page. 5. Create a [employer] ID. This will be your [employer] login ID and cannot be changed, so think of one that is secure and easy to remember. 6. Create a [employer] password. You can change your password at any time. 7. Enter your Password Reset Question and Answer. This can be used at a later time if you forget your password. 8. Enter your e-mail address. You will receive e-mail notification when new information is available in [address] 9. Click Sign Up. You can now view and download portions of your medical record. 10. Click the Download Summary menu link to download a portable copy of your medical information. If you have questions, please visit the Frequently Asked Questions section of the [employer] website. Remember, [employer] is NOT to be used for urgent needs. For medical emergencies, dial 911. Now available from your iPhone and Android! Please provide this summary of care documentation to your next provider. Your primary care clinician is listed as Via Gaye Mcdonald. If you have any questions after today's visit, please call [phone].

## 2018-06-06 NOTE — PATIENT INSTRUCTIONS
Osteoporosis/Osteopenia Management    I recommend you take at least 1200 mg of elemental calcium daily (one 600 mg tablet in the morning and one in the afternoon/evening) and/or consume dietary calcium in addition to oral to at least 1200 mg.    I recommend at least 800 IU of vitamin D daily. I will check your vitamin D level today, and if it is low, I will prescribe you a weekly supplement called ergocalciferol 50,000. I recommend that you perform physical exercise daily for at least 30 minutes with low intensity resistance training with lightweights or rubber tension ropes (Thera-Band). Keep up the good work! Please call the Patient Care Scheduling Team [phone] to schedule your bone density test if you do not hear from them in 24 hours. Stop the alendronate since you have been on that for 5 years now. If bone density test still shows the need for treatment, I will switch you to Prolia, an injection administered at the Out Patient [facility] once every 6 months. Denosumab (By injection)   Denosumab ([id number])  Treats osteoporosis, bone cancer, hypercalcemia, and other bone problems in patients who have cancer. Brand Name(s): Prolia, Xgeva   There may be other brand names for this medicine. When This Medicine Should Not Be Used: This medicine is not right for everyone. You should not receive it if you had an allergic reaction to denosumab or if you are pregnant. How to Use This Medicine:   Injectable  · A doctor or other health professional will give you this medicine. This medicine is usually given as a shot under the skin of your upper arm, upper thigh, or stomach. · This medicine should come with a Medication Guide. Ask your pharmacist for a copy if you do not have one. · Missed dose: Call your doctor or pharmacist for instructions.   Drugs and Foods to Avoid:   Ask your doctor or pharmacist before using any other medicine, including over-the-counter medicines, vitamins, and herbal products. · Do not use Prolia® and Xgeva® together. They contain the same medicine. · Some medicines can affect how denosumab works. Tell your doctor if you are also using medicine that weakens your immune system, including a steroid or cancer medicine. Warnings While Using This Medicine:   · This medicine may cause birth defects if either partner is using it during conception or pregnancy. Tell your doctor right away if you or your partner becomes pregnant. Use an effective form of birth control. Women who are being treated with Delmis Flake should continue using birth control for at least 5 months after the last dose. · Tell your doctor if you are breastfeeding, or if you have kidney disease, diabetes, gum disease, or an allergy to latex. Tell your doctor if you have problems with your thyroid, parathyroid, or digestive system. · This medicine may cause the following problems:  ¨ Low calcium levels in your blood  ¨ Increased risk of broken thigh bone  ¨ Increased risk of infections  ¨ Serious skin reactions  ¨ Severe bone, joint, or muscle pain  · This medicine can cause jaw problems. You must have regular dental exams while you are being treated with this medicine. Tell your dentist that you are using this medicine. Practice good oral hygiene. · Do not suddenly stop using Prolia® without checking first with your doctor. Doing so may increase risk for more fractures. Talk to your doctor about other medicine that you can take. · Your doctor will do lab tests at regular visits to check on the effects of this medicine. Keep all appointments.   Possible Side Effects While Using This Medicine:   Call your doctor right away if you notice any of these side effects:  · Allergic reaction: Itching or hives, swelling in your face or hands, swelling or tingling in your mouth or throat, chest tightness, trouble breathing  · Blistering, peeling, red skin rash  · Chest pain, fast or uneven heartbeat, trouble breathing  · Fever, chills, cough, sore throat, body aches  · Lightheadedness, dizziness, fainting  · Muscle spasms or twitching, numbness or tingling in your fingers, toes, or lips  · Pain or burning during urination, change in how much or how often you urinate  · Pain, swelling, heavy feeling, or numbness in your mouth or jaw, loose teeth or other teeth problems  · Severe bone, joint, or muscle pain  · Unusual pain in your thigh, groin, or hip  If you notice these less serious side effects, talk with your doctor:   · Diarrhea, nausea  · Redness, pain, itching, burning, swelling, or a lump under your skin where the shot was given  · Tiredness or weakness  If you notice other side effects that you think are caused by this medicine, tell your doctor. Call your doctor for medical advice about side effects. You may report side effects to FDA at 3-455-FDA-0376  © 2017 [facility] Information is for End User's use only and may not be sold, redistributed or otherwise used for commercial purposes. The above information is an  only. It is not intended as medical advice for individual conditions or treatments. Talk to your doctor, nurse or pharmacist before following any medical regimen to see if it is safe and effective for you.

## 2018-06-06 NOTE — PROGRESS NOTES
Chief Complaint   Patient presents with    Osteoporosis     1. Have you been to the ER, urgent care clinic since your last visit? Hospitalized since your last visit? Yes Where: [facility]    2. Have you seen or consulted any other health care providers outside of the [address] [place] [address] since your last visit? Include any pap smears or colon screening.  No   Visit Vitals    /83 (BP 1 Location: Left arm, BP Patient Position: Sitting)    Pulse 60    Temp 98.5 °F (36.9 °C) (Oral)    Resp 20    Ht 5' 3\" (1.6 m)    Wt 157 lb (71.2 kg)    SpO2 98%    BMI 27.81 kg/m2

## 2018-06-07 LAB — PTH-INTACT SERPL-MCNC: 35 PG/ML (ref 15–65)

## 2018-06-08 LAB
25(OH)D3+25(OH)D2 SERPL-MCNC: 31.2 NG/ML (ref 30–100)
ALBUMIN SERPL ELPH-MCNC: 4.2 G/DL (ref 2.9–4.4)
ALBUMIN/GLOB SERPL: 1.2 {RATIO} (ref 0.7–1.7)
ALPHA1 GLOB SERPL ELPH-MCNC: 0.2 G/DL (ref 0–0.4)
ALPHA2 GLOB SERPL ELPH-MCNC: 0.7 G/DL (ref 0.4–1)
B-GLOBULIN SERPL ELPH-MCNC: 1.1 G/DL (ref 0.7–1.3)
GAMMA GLOB SERPL ELPH-MCNC: 1.4 G/DL (ref 0.4–1.8)
GLOBULIN SER CALC-MCNC: 3.4 G/DL (ref 2.2–3.9)
M PROTEIN SERPL ELPH-MCNC: NORMAL G/DL
PLEASE NOTE, 011150: NORMAL
PROT PATTERN SERPL ELPH-IMP: NORMAL
PROT SERPL-MCNC: 7.6 G/DL (ref 6–8.5)

## 2018-07-16 ENCOUNTER — HOSPITAL ENCOUNTER (OUTPATIENT)
Dept: BONE DENSITY | Age: 68
Discharge: HOME | End: 2018-07-16
Payer: MEDICARE

## 2018-07-16 ENCOUNTER — HOSPITAL ENCOUNTER (OUTPATIENT)
Dept: MAMMOGRAPHY | Age: 68
Discharge: HOME | End: 2018-07-16
Payer: MEDICARE

## 2018-07-16 DIAGNOSIS — M85.89 OSTEOPENIA OF MULTIPLE SITES: ICD-10-CM

## 2018-07-16 DIAGNOSIS — Z12.39 BREAST CANCER SCREENING: ICD-10-CM

## 2018-07-16 PROCEDURE — 77080 DXA BONE DENSITY AXIAL: CPT

## 2018-07-16 PROCEDURE — 77067 SCR MAMMO BI INCL CAD: CPT

## 2018-08-01 NOTE — PROGRESS NOTES
Results reviewed and letter sent. (Repeat DXA in 2020 and consider Prolia if spine has decreased.)    We were previously following your left hip for the osteoporosis. However, since you have had left hip replacement, we can not perform a bone density test on that site. I stopped your alendronate since you completed 5 years on it. Overall your spine has increased in bone density since 2013. Stay off of medicine for now. Repeat bone density test in July 2020.

## 2018-11-27 ENCOUNTER — OFFICE VISIT (OUTPATIENT)
Dept: FAMILY MEDICINE CLINIC | Age: 68
End: 2018-11-27

## 2018-11-27 VITALS
RESPIRATION RATE: 18 BRPM | DIASTOLIC BLOOD PRESSURE: 78 MMHG | BODY MASS INDEX: 28.14 KG/M2 | WEIGHT: 158.8 LBS | HEART RATE: 60 BPM | TEMPERATURE: 97 F | SYSTOLIC BLOOD PRESSURE: 134 MMHG | OXYGEN SATURATION: 98 % | HEIGHT: 63 IN

## 2018-11-27 DIAGNOSIS — D86.9 SARCOIDOSIS: ICD-10-CM

## 2018-11-27 DIAGNOSIS — Z00.00 MEDICARE ANNUAL WELLNESS VISIT, SUBSEQUENT: ICD-10-CM

## 2018-11-27 DIAGNOSIS — M16.9 OSTEOARTHRITIS OF HIP, UNSPECIFIED LATERALITY, UNSPECIFIED OSTEOARTHRITIS TYPE: ICD-10-CM

## 2018-11-27 DIAGNOSIS — Z23 ENCOUNTER FOR IMMUNIZATION: ICD-10-CM

## 2018-11-27 DIAGNOSIS — I10 ESSENTIAL HYPERTENSION: Primary | ICD-10-CM

## 2018-11-27 DIAGNOSIS — M85.89 OSTEOPENIA OF MULTIPLE SITES: ICD-10-CM

## 2018-11-27 NOTE — PROGRESS NOTES
Adri Armando is a 79 y.o. female who presents for a Medicare Subsequent Annual Wellness Exam and follow up of chronic medical conditions. I have reviewed the patient's medical history in detail and updated the computerized patient record. History Patient Active Problem List  
 Diagnosis  Degenerative joint disease (DJD) of hip  Primary osteoarthritis of left hip  Osteoarthritis, knee  Sarcoidosis,lung  Osteopenia  
 HTN (hypertension)  Hyponatremia Patient Care Team: 
Benny Torres NP as PCP - General (Nurse Practitioner) Past Medical History:  
Diagnosis Date  Arthritis OA  COPD   
 d/t sarcoidosis  Hypertension  Hyponatremia 2009 FOLLOWING TREATMENT WITH HCTZ; NOW RESOLVED 9/24/12  Osteopenia  Sarcoidosis   
 nonsymptomatic Past Surgical History:  
Procedure Laterality Date  BONE MARROW ASPIRATE &BIOPSY  1979 SARCOIDOSIS DIAGNOSED [facility]  HX HIP REPLACEMENT  2012  
 right  HX HIP REPLACEMENT Left 08/26/2016  HX ORTHOPAEDIC    
 excision right wrist cyst  
 [facility] Current Outpatient Medications Medication Sig Dispense Refill  atenolol (TENORMIN) 50 mg tablet Take 1 Tab by mouth daily. 90 Tab 2  
 candesartan (ATACAND) 16 mg tablet Take 1 Tab by mouth daily. 90 Tab 2  
 amLODIPine (NORVASC) 5 mg tablet Take 1 Tab by mouth daily. 90 Tab 2  
 multivitamin (ONE A DAY) tablet Take 1 Tab by mouth daily.  calcium carbonate-vitamin D3 1,000 mg(2,500 mg)-800 unit Tab Take  by mouth daily.  alendronate (FOSAMAX) 70 mg tablet Take 1 Tab by mouth every seven (7) days. (Patient not taking: Reported on 11/27/2018) 13 Tab 3  
 albuterol (PROVENTIL HFA, VENTOLIN HFA, PROAIR HFA) 90 mcg/actuation inhaler Take 2 Puffs by inhalation every four (4) hours as needed for Wheezing or Shortness of Breath. Allergies Allergen Reactions  Lisinopril Swelling and Cough Lips swell  Pcn [Penicillins] Hives  Hydrochlorothiazide Unknown (comments) NA down, fatigue , myalgia Family History Problem Relation Age of Onset  Arthritis-rheumatoid Mother  Hypertension Mother  Elevated Lipids Mother  Cancer Mother   
     bladder  Stroke Mother  Elevated Lipids Sister  Hypertension Sister  Diabetes Sister  Elevated Lipids Sister  Hypertension Sister  Other Son Jeri Mcclure Other Daughter KERATOCONUS  
 Anesth Problems Neg Hx Social History Tobacco Use  Smoking status: Former Smoker Packs/day: 0.50 Years: 30.00 Pack years: 15.00 Last attempt to quit: 2012 Years since quittin.5  Smokeless tobacco: Never Used Substance Use Topics  Alcohol use: Yes Alcohol/week: 4.2 - 6.0 oz Types: 7 - 10 Cans of beer per week Comment: 3-4 beers daily, may have 6 beers on weekends Alcohol Risk Factor Screening: You do not drink alcohol or very rarely. Review of Systems: 
 
Functional Ability and Level of Safety:  
Hearing Loss Hearing is good. Activities of Daily Living ADL Assessment 2018 Feeding yourself No Help Needed Getting from bed to chair No Help Needed Getting dressed No Help Needed Bathing or showering No Help Needed Walk across the room (includes cane/walker) No Help Needed Using the telphone No Help Needed Taking your medications No Help Needed Preparing meals No Help Needed Managing money (expenses/bills) No Help Needed Moderately strenuous housework (laundry) No Help Needed Shopping for personal items (toiletries/medicines) No Help Needed Shopping for groceries No Help Needed Driving No Help Needed Climbing a flight of stairs No Help Needed Getting to places beyond walking distances No Help Needed Fall Risk Fall Risk Assessment, last 12 mths 11/27/2018 Able to walk? Yes Fall in past 12 months? No  
 
 
Abuse Screen Abuse Screening Questionnaire 11/27/2018 Do you ever feel afraid of your partner? Mariela Jolly Are you in a relationship with someone who physically or mentally threatens you? Mariela Jolly Is it safe for you to go home? [facility] Cognitive Screening Evaluation of Cognitive Function: 
Has your family/caregiver stated any concerns about your memory: no 
 
Depression Risk Factor Screening: PHQ over the last two weeks 5/23/2018 Little interest or pleasure in doing things Not at all Feeling down, depressed, irritable, or hopeless Not at all Total Score PHQ 2 0 Physical Exam  
 
Visit Vitals /78 Pulse 60 Temp 97 °F (36.1 °C) (Oral) Resp 18 Ht 5' 3\" (1.6 m) Wt 158 lb 12.8 oz (72 kg) SpO2 98% BMI 28.13 kg/m² Assessment/Plan [address] education and counseling provided: 
Age appropriate evidence-based preventive care recommendations based on today's review and evaluation; including relevant cancer screening guidelines, and vaccination recommendations. An After Visit Summary was printed and given to the patient which information about these guidelines, and a personalized schedule for health maintenance items. Whe appropriate and with patient agreement, orders noted below were placed to complete missing health maintenance items. Additional Plan for follow up chronic medical conditions includes: 
 
Diagnoses and all orders for this visit: 1. Essential hypertension -     METABOLIC PANEL, COMPREHENSIVE 2. [place] 3. Osteopenia of multiple sites 4. Osteoarthritis of hip, unspecified laterality, unspecified osteoarthritis type 5.  Encounter for immunization 
-     INFLUENZA VACCINE INACTIVATED (IIV), SUBUNIT, ADJUVANTED, IM 
-     NE IMMUNIZ ADMIN,1 SINGLE/COMB VAC/TOXOID 
-     TETANUS, DIPHTHERIA TOXOIDS AND ACELLULAR PERTUSSIS VACCINE (TDAP), IN INDIVIDS. >=7, IM 
 -     PNEUMOCOCCAL CONJ VACCINE 13 VALENT IM 6. Medicare annual wellness visit, subsequent Health Maintenance Due Topic Date Due  
 DTaP/Tdap/Td series (1 - Tdap) 10/24/1973  Shingrix Vaccine Age 50> (1 of 2) 10/24/2002  Pneumococcal 65+ Low/Medium Risk (2 of 2 - PPSV23) 10/24/2017  MEDICARE YEARLY EXAM  04/27/2018  Influenza Age 5 to Adult  08/01/2018  COLONOSCOPY  01/01/2019 Grace Lee, NP

## 2018-11-27 NOTE — PROGRESS NOTES
HISTORY OF PRESENT ILLNESS Adela Florian is a 79 y.o. female. HPI  Patient comes in today for follow up Hx pulmonary sarcoid. saw Dr. Devon Moore in August, had CXR and PFTs. Given clear to follow up in 1 year. Hx of osteopenia, Taking Fosamax, needed to follow up with Dr. Claudette Sullivan but was told he is out of office indefinitely. saw rheumatology in June 2018. they recommended stop taking fosamax, ordered DEXA, and if still needed therapy, would switch to Prolia. .  Had DEXA scan in July 2018, recommended to stay off fosamax and take calcium with vitamin D 
GYN provider. Was told she no longer needed paps after 2015. Patient had abnormal pap in 1980s. Colonoscopy in 2009 with Dr. Justin Pereyra. .  Does not recall results, recalls being told 10 year follow up Hx HTN, states compliance with medications. Tries to follow low sodium diet. Denies chest pains, palpitations, dizziness, dyspnea, tingling in extremities. Urinating without difficulty. Allergies Allergen Reactions  Lisinopril Swelling and Cough Lips swell  Pcn [Penicillins] Hives  Hydrochlorothiazide Unknown (comments) NA down, fatigue , myalgia Past Medical History:  
Diagnosis Date  Arthritis OA  COPD   
 d/t sarcoidosis  Hypertension  Hyponatremia 2009 FOLLOWING TREATMENT WITH HCTZ; NOW RESOLVED 9/24/12  Osteopenia  Sarcoidosis   
 nonsymptomatic Past Surgical History:  
Procedure Laterality Date  BONE MARROW ASPIRATE &BIOPSY  1979 SARCOIDOSIS DIAGNOSED [facility]  HX HIP REPLACEMENT  2012  
 right  HX HIP REPLACEMENT Left 08/26/2016  HX ORTHOPAEDIC    
 excision right wrist cyst  
 [address] [place] Social History Socioeconomic History  Marital status:  Spouse name: Not on file  Number of children: Not on file  Years of education: Not on file  Highest education level: Not on file Social Needs  Financial resource strain: Not on file  Food insecurity - worry: Not on file  Food insecurity - inability: Not on file  Transportation needs - medical: Not on file  Transportation needs - non-medical: Not on file Occupational History  Not on file Tobacco Use  Smoking status: Former Smoker Packs/day: 0.50 Years: 30.00 Pack years: 15.00 Last attempt to quit: 2012 Years since quittin.5  Smokeless tobacco: Never Used Substance and Sexual Activity  Alcohol use: Yes Alcohol/week: 4.2 - 6.0 oz Types: 7 - 10 Cans of beer per week Comment: 3-4 beers daily, may have 6 beers on weekends  Drug use: No  
 Sexual activity: No  
Other Topics Concern  Not on file Social History Narrative Retired from [employer] . 3 children, 2 sons, 1 daughter Family History Problem Relation Age of Onset  Arthritis-rheumatoid Mother  Hypertension Mother  Elevated Lipids Mother  Cancer Mother   
     bladder  Stroke Mother  Elevated Lipids Sister  Hypertension Sister  Diabetes Sister  Elevated Lipids Sister  Hypertension Sister  Other Son Billy Harris Other Daughter KERATOCONUS  
 Anesth Problems Neg Hx Current Outpatient Medications Medication Sig  
 atenolol (TENORMIN) 50 mg tablet Take 1 Tab by mouth daily.  candesartan (ATACAND) 16 mg tablet Take 1 Tab by mouth daily.  amLODIPine (NORVASC) 5 mg tablet Take 1 Tab by mouth daily.  multivitamin (ONE A DAY) tablet Take 1 Tab by mouth daily.  calcium carbonate-vitamin D3 1,000 mg(2,500 mg)-800 unit Tab Take  by mouth daily.  alendronate (FOSAMAX) 70 mg tablet Take 1 Tab by mouth every seven (7) days. (Patient not taking: Reported on 2018)  albuterol (PROVENTIL HFA, VENTOLIN HFA, PROAIR HFA) 90 mcg/actuation inhaler Take 2 Puffs by inhalation every four (4) hours as needed for Wheezing or Shortness of Breath. No current facility-administered medications for this visit. Review of Systems Constitutional: Negative for chills, diaphoresis, fever, malaise/fatigue and weight loss. HENT: Negative. Respiratory: Negative for cough and shortness of breath. Cardiovascular: Negative for chest pain, palpitations and leg swelling. Gastrointestinal: Negative for abdominal pain, blood in stool, constipation, diarrhea, nausea and vomiting. Genitourinary: Negative for dysuria, flank pain, frequency, hematuria and urgency. Musculoskeletal: Negative for back pain, joint pain and myalgias. Skin: Negative. Neurological: Negative for dizziness, tingling, sensory change, speech change, focal weakness and headaches. Psychiatric/Behavioral: Negative for depression. The patient is not nervous/anxious and does not have insomnia. Vitals:  
 11/27/18 1035 BP: 140/86 Pulse: 60 Resp: 18 Temp: 97 °F (36.1 °C) TempSrc: Oral  
SpO2: 98% Weight: 158 lb 12.8 oz (72 kg) Height: 5' 3\" (1.6 m) Physical Exam  
Constitutional: She is oriented to person, place, and time. Vital signs are normal. She appears well-developed and well-nourished. She is cooperative. Neck: No thyromegaly present. Cardiovascular: Normal rate, regular rhythm, S1 normal, S2 normal and normal heart sounds. No murmur heard. Pulses: 
     Radial pulses are 2+ on the right side, and 2+ on the left side. Dorsalis pedis pulses are 2+ on the right side, and 2+ on the left side. Posterior tibial pulses are 2+ on the right side, and 2+ on the left side. No edema noted Pulmonary/Chest: Effort normal and breath sounds normal. She has no decreased breath sounds. She has no wheezes. She has no rhonchi. She has no rales. Abdominal: Soft.  Normal appearance and bowel sounds are normal. There is no hepatosplenomegaly. There is no tenderness. There is no CVA tenderness. Neurological: She is alert and oriented to person, place, and time. Skin: Skin is warm, dry and intact. Psychiatric: She has a normal mood and affect. Her speech is normal and behavior is normal. Thought content normal.  
Vitals reviewed. ASSESSMENT and PLAN 
  ICD-10-CM ICD-9-CM 1. Essential hypertension C34 340.9 METABOLIC PANEL, COMPREHENSIVE 2. Sarcoidosis,lung D86.9 135   
3. Osteopenia of multiple sites M85.89 733.90   
4. Osteoarthritis of hip, unspecified laterality, unspecified osteoarthritis type M16.9 715.95   
5. Encounter for immunization Z23 V03.89 INFLUENZA VACCINE INACTIVATED (IIV), SUBUNIT, ADJUVANTED, IM  
   OK IMMUNIZ ADMIN,1 SINGLE/COMB VAC/TOXOID  
   TETANUS, DIPHTHERIA TOXOIDS AND ACELLULAR PERTUSSIS VACCINE (TDAP), IN INDIVIDS. >=7, IM  
   PNEUMOCOCCAL CONJ VACCINE 13 VALENT IM Encounter Diagnoses Name Primary?  Essential hypertension Yes  Sarcoidosis,lung  Osteopenia of multiple sites  Osteoarthritis of hip, unspecified laterality, unspecified osteoarthritis type  Encounter for immunization Orders Placed This Encounter  Influenza Vaccine Inactivated (IIV) (FLUAD), Subunit, Adjuvanted, IM (90410)  Tetanus, diphtheria toxoids and acellular pertussis (TDAP) vaccine, in individuals >=7 years, IM  
 Pneumococcal conjugate 13 valent, IM (PREVNAR-13)  METABOLIC PANEL, COMPREHENSIVE Diagnoses and all orders for this visit: 1. Essential hypertension - stable. -     METABOLIC PANEL, COMPREHENSIVE 2. Sarcoidosis,lung - followed by pulmonary. Visit in august 2018. PFTs and CXR stable. 3. Osteopenia of multiple sites - followed by rheumatology. Was recently taken off Fosamax, DEXA scan improved. Patient taking OTC calcium with vitamin D 
 
4. Osteoarthritis of hip, unspecified laterality, unspecified osteoarthritis type - s/p THR 5. Encounter for immunization 
-     INFLUENZA VACCINE INACTIVATED (IIV), SUBUNIT, ADJUVANTED, IM 
-     ID IMMUNIZ ADMIN,1 SINGLE/COMB VAC/TOXOID 
-     TETANUS, DIPHTHERIA TOXOIDS AND ACELLULAR PERTUSSIS VACCINE (TDAP), IN INDIVIDS. >=7, IM 
-     PNEUMOCOCCAL CONJ VACCINE 13 VALENT IM Follow-up Disposition: 
Return in about 6 months (around 5/27/2019). lab results and schedule of future lab studies reviewed with patient 
reviewed diet, exercise and weight control I have reviewed the patient's allergies and made any necessary changes. Medical, procedural, social and family histories have been reviewed and updated as medically indicated. I have reconciled and/or revised patient medications in the EMR. I have discussed each diagnosis listed in this note with Megan Bhatt and/or their family. I have discussed treatment options and the risk/benefit analysis of those options, including safe use of medications and possible medication side effects. Through the use of shared decision making we have agreed to the above plan. The patient has received an after-visit summary and questions were answered concerning future plans. Denisse Hammer, TOD-C This note will not be viewable in [address]

## 2018-11-27 NOTE — PATIENT INSTRUCTIONS
High Blood Pressure: Care Instructions Your Care Instructions If your blood pressure is usually above 130/80, you have high blood pressure, or hypertension. That means the top number is 130 or higher or the bottom number is 80 or higher, or both. Despite what a lot of people think, high blood pressure usually doesn't cause headaches or make you feel dizzy or lightheaded. It usually has no symptoms. But it does increase your risk for heart attack, stroke, and kidney or eye damage. The higher your blood pressure, the more your risk increases. Your doctor will give you a goal for your blood pressure. Your goal will be based on your health and your age. Lifestyle changes, such as eating healthy and being active, are always important to help lower blood pressure. You might also take medicine to reach your blood pressure goal. 
Follow-up care is a key part of your treatment and safety. Be sure to make and go to all appointments, and call your doctor if you are having problems. It's also a good idea to know your test results and keep a list of the medicines you take. How can you care for yourself at home? Medical treatment · If you stop taking your medicine, your blood pressure will go back up. You may take one or more types of medicine to lower your blood pressure. Be safe with medicines. Take your medicine exactly as prescribed. Call your doctor if you think you are having a problem with your medicine. · Talk to your doctor before you start taking aspirin every day. Aspirin can help certain people lower their risk of a heart attack or stroke. But taking aspirin isn't right for everyone, because it can cause serious bleeding. · See your doctor regularly. You may need to see the doctor more often at first or until your blood pressure comes down. · If you are taking blood pressure medicine, talk to your doctor before you take decongestants or anti-inflammatory medicine, such as ibuprofen. Some of these medicines can raise blood pressure. · Learn how to check your blood pressure at home. Lifestyle changes · Stay at a healthy weight. This is especially important if you put on weight around the waist. Losing even 10 pounds can help you lower your blood pressure. · If your doctor recommends it, get more exercise. Walking is a good choice. Bit by bit, increase the amount you walk every day. Try for at least 30 minutes on most days of the week. You also may want to swim, bike, or do other activities. · Avoid or limit alcohol. Talk to your doctor about whether you can drink any alcohol. · Try to limit how much sodium you eat to less than 2,300 milligrams (mg) a day. Your doctor may ask you to try to eat less than 1,500 mg a day. · Eat plenty of fruits (such as bananas and oranges), vegetables, legumes, whole grains, and low-fat dairy products. · Lower the amount of saturated fat in your diet. Saturated fat is found in animal products such as milk, cheese, and meat. Limiting these foods may help you lose weight and also lower your risk for heart disease. · Do not smoke. Smoking increases your risk for heart attack and stroke. If you need help quitting, talk to your doctor about stop-smoking programs and medicines. These can increase your chances of quitting for good. When should you call for help? Call 911 anytime you think you may need emergency care. This may mean having symptoms that suggest that your blood pressure is causing a serious heart or blood vessel problem. Your blood pressure may be over 180/120. 
 For example, call 911 if: 
  · You have symptoms of a heart attack. These may include: 
? Chest pain or pressure, or a strange feeling in the chest. 
? Sweating. ? Shortness of breath. ? Nausea or vomiting. ? Pain, pressure, or a strange feeling in the back, neck, jaw, or upper belly or in one or both shoulders or arms. ? Lightheadedness or sudden weakness. ? A fast or irregular heartbeat.  
  · You have symptoms of a stroke. These may include: 
? Sudden numbness, tingling, weakness, or loss of movement in your face, arm, or leg, especially on only one side of your body. ? Sudden vision changes. ? Sudden trouble speaking. ? Sudden confusion or trouble understanding simple statements. ? Sudden problems with walking or balance. ? A sudden, severe headache that is different from past headaches.  
  · You have severe back or belly pain.  
 Do not wait until your blood pressure comes down on its own. Get help right away. 
 Call your doctor now or seek immediate care if: 
  · Your blood pressure is much higher than normal (such as 180/120 or higher), but you don't have symptoms.  
  · You think high blood pressure is causing symptoms, such as: 
? Severe headache. 
? Blurry vision.  
 Watch closely for changes in your health, and be sure to contact your doctor if: 
  · Your blood pressure measures higher than your doctor recommends at least 2 times. That means the top number is higher or the bottom number is higher, or both.  
  · You think you may be having side effects from your blood pressure medicine. Where can you learn more? Go to http://janeth-hailey.info/. Enter E158 in the search box to learn more about \"High Blood Pressure: Care Instructions. \" Current as of: December 6, 2017 Content Version: 11.8 © 3576-4639 [employer]. Care instructions adapted under license by [employer] (which disclaims liability or warranty for this information). If you have questions about a medical condition or this instruction, always ask your healthcare professional. [place] [address] any warranty or liability for your use of this information. Low Sodium Diet (2,000 Milligram): Care Instructions Your Care Instructions Too much sodium causes your body to hold on to extra water.  This can raise your blood pressure and force your heart and kidneys to work harder. In very serious cases, this could cause you to be put in the hospital. It might even be life-threatening. By limiting sodium, you will feel better and lower your risk of serious problems. The most common source of sodium is salt. People get most of the salt in their diet from canned, prepared, and packaged foods. Fast food and restaurant meals also are very high in sodium. Your doctor will probably limit your sodium to less than 2,000 milligrams (mg) a day. This limit counts all the sodium in prepared and packaged foods and any salt you add to your food. Follow-up care is a key part of your treatment and safety. Be sure to make and go to all appointments, and call your doctor if you are having problems. It's also a good idea to know your test results and keep a list of the medicines you take. How can you care for yourself at home? Read food labels · Read labels on cans and food packages. The labels tell you how much sodium is in each serving. Make sure that you look at the serving size. If you eat more than the serving size, you have eaten more sodium. · Food labels also tell you the Percent Daily Value for sodium. Choose products with low Percent Daily Values for sodium. · Be aware that sodium can come in forms other than salt, including monosodium glutamate (MSG), sodium citrate, and sodium bicarbonate (baking soda). MSG is often added to Asian food. When you eat out, you can sometimes ask for food without MSG or added salt. Buy low-sodium foods · Buy foods that are labeled \"unsalted\" (no salt added), \"sodium-free\" (less than 5 mg of sodium per serving), or \"low-sodium\" (less than 140 mg of sodium per serving). Foods labeled \"reduced-sodium\" and \"light sodium\" may still have too much sodium. Be sure to read the label to see how much sodium you are getting. · Buy fresh vegetables, or frozen vegetables without added sauces.  Buy low-sodium versions of canned vegetables, soups, and other canned goods. Prepare low-sodium meals · Cut back on the amount of salt you use in cooking. This will help you adjust to the taste. Do not add salt after cooking. One teaspoon of salt has about 2,300 mg of sodium. · Take the salt shaker off the table. · Flavor your food with garlic, lemon juice, onion, vinegar, herbs, and spices. Do not use soy sauce, lite soy sauce, steak sauce, onion salt, garlic salt, celery salt, mustard, or ketchup on your food. · Use low-sodium salad dressings, sauces, and ketchup. Or make your own salad dressings and sauces without adding salt. · Use less salt (or none) when recipes call for it. You can often use half the salt a recipe calls for without losing flavor. Other foods such as rice, pasta, and grains do not need added salt. · Rinse canned vegetables, and cook them in fresh water. This removes somebut not allof the salt. · Avoid water that is naturally high in sodium or that has been treated with water softeners, which add sodium. Call your local water company to find out the sodium content of your water supply. If you buy bottled water, read the label and choose a sodium-free brand. Avoid high-sodium foods · Avoid eating: 
? Smoked, cured, salted, and canned meat, fish, and poultry. ? Ham, sanchez, hot dogs, and luncheon meats. ? Regular, hard, and processed cheese and regular peanut butter. ? Crackers with salted tops, and other salted snack foods such as pretzels, chips, and salted popcorn. ? Frozen prepared meals, unless labeled low-sodium. ? Canned and dried soups, broths, and bouillon, unless labeled sodium-free or low-sodium. ? Canned vegetables, unless labeled sodium-free or low-sodium. ? Western [place] fries, pizza, tacos, and other fast foods. ? Pickles, olives, ketchup, and other condiments, especially soy sauce, unless labeled sodium-free or low-sodium. Where can you learn more? Go to http://janeth-hailey.info/. Enter G414 in the search box to learn more about \"Low Sodium Diet (2,000 Milligram): Care Instructions. \" Current as of: March 29, 2018 Content Version: 11.8 © 7261-1463 [employer]. Care instructions adapted under license by [employer] (which disclaims liability or warranty for this information). If you have questions about a medical condition or this instruction, always ask your healthcare professional. [address] any warranty or liability for your use of this information. Vaccine Information Statement Influenza (Flu) Vaccine (Inactivated or Recombinant): What you need to know Many Vaccine Information Statements are available in [place]ian and other languages. See www.immunize.org/vis Hojas de Información Sobre Vacunas están disponibles en Español y en muchos otros idiomas. Visite www.immunize.org/vis 1. Why get vaccinated? Influenza (flu) is a contagious disease that spreads around the [facility] every year, usually between October and May. Flu is caused by influenza viruses, and is spread mainly by coughing, sneezing, and close contact. Anyone can get flu. Flu strikes suddenly and can last several days. Symptoms vary by age, but can include: 
 fever/chills  sore throat  muscle aches  fatigue  cough  headache  runny or stuffy nose Flu can also lead to pneumonia and blood infections, and cause diarrhea and seizures in children. If you have a medical condition, such as heart or lung disease, flu can make it worse. Flu is more dangerous for some people. Infants and young children, people 72years of age and older, pregnant women, and people with certain health conditions or a weakened immune system are at greatest risk. Each year thousands of people in the [facility] die from flu, and many more are hospitalized. Flu vaccine can:  keep you from getting flu, 
 make flu less severe if you do get it, and 
 keep you from spreading flu to your family and other people. 2. Inactivated and recombinant flu vaccines A dose of flu vaccine is recommended every flu season. Children 6 months through 6years of age may need two doses during the same flu season. Everyone else needs only one dose each flu season. Some inactivated flu vaccines contain a very small amount of a mercury-based preservative called thimerosal. Studies have not shown thimerosal in vaccines to be harmful, but flu vaccines that do not contain thimerosal are available. There is no live flu virus in flu shots. They cannot cause the flu. There are many flu viruses, and they are always changing. Each year a new flu vaccine is made to protect against three or four viruses that are likely to cause disease in the upcoming flu season. But even when the vaccine doesnt exactly match these viruses, it may still provide some protection Flu vaccine cannot prevent: 
 flu that is caused by a virus not covered by the vaccine, or 
 illnesses that look like flu but are not. It takes about 2 weeks for protection to develop after vaccination, and protection lasts through the flu season. 3. Some people should not get this vaccine Tell the person who is giving you the vaccine:  If you have any severe, life-threatening allergies. If you ever had a life-threatening allergic reaction after a dose of flu vaccine, or have a severe allergy to any part of this vaccine, you may be advised not to get vaccinated. Most, but not all, types of flu vaccine contain a small amount of egg protein.  If you ever had Guillain-Barré Syndrome (also called GBS). Some people with a history of GBS should not get this vaccine. This should be discussed with your doctor.  If you are not feeling well. It is usually okay to get flu vaccine when you have a mild illness, but you might be asked to come back when you feel better. 4. Risks of a vaccine reaction With any medicine, including vaccines, there is a chance of reactions. These are usually mild and go away on their own, but serious reactions are also possible. Most people who get a flu shot do not have any problems with it. Minor problems following a flu shot include:  
 soreness, redness, or swelling where the shot was given  hoarseness  sore, red or itchy eyes  cough  fever  aches  headache  itching  fatigue If these problems occur, they usually begin soon after the shot and last 1 or 2 days. More serious problems following a flu shot can include the following:  There may be a small increased risk of Guillain-Barré Syndrome (GBS) after inactivated flu vaccine. This risk has been estimated at 1 or 2 additional cases per million people vaccinated. This is much lower than the risk of severe complications from flu, which can be prevented by flu vaccine.  Young children who get the flu shot along with pneumococcal vaccine (PCV13) and/or DTaP vaccine at the same time might be slightly more likely to have a seizure caused by fever. Ask your doctor for more information. Tell your doctor if a child who is getting flu vaccine has ever had a seizure. Problems that could happen after any injected vaccine:  People sometimes faint after a medical procedure, including vaccination. Sitting or lying down for about 15 minutes can help prevent fainting, and injuries caused by a fall. Tell your doctor if you feel dizzy, or have vision changes or ringing in the ears.  Some people get severe pain in the shoulder and have difficulty moving the arm where a shot was given. This happens very rarely.  Any medication can cause a severe allergic reaction.  Such reactions from a vaccine are very rare, estimated at about 1 in a million doses, and would happen within a few minutes to a few hours after the vaccination. As with any medicine, there is a very remote chance of a vaccine causing a serious injury or death. The safety of vaccines is always being monitored. For more information, visit: www.cdc.gov/vaccinesafety/ 
 
5. What if there is a serious reaction? What should I look for?  Look for anything that concerns you, such as signs of a severe allergic reaction, very high fever, or unusual behavior. Signs of a severe allergic reaction can include hives, swelling of the face and throat, difficulty breathing, a fast heartbeat, dizziness, and weakness  usually within a few minutes to a few hours after the vaccination. What should I do?  If you think it is a severe allergic reaction or other emergency that cant wait, call 9-1-1 and get the person to the nearest hospital. Otherwise, call your doctor.  Reactions should be reported to the Vaccine Adverse Event Reporting System (VAERS). Your doctor should file this report, or you can do it yourself through  the VAERS web site at www.vaers. [facility].gov, or by calling 6-207.980.1656. VAERS does not give medical advice. 6. The National Vaccine Injury Compensation Program 
 
The [facility] Vaccine Injury Compensation Program (VICP) is a federal program that was created to compensate people who may have been injured by certain vaccines. Persons who believe they may have been injured by a vaccine can learn about the program and about filing a claim by calling 9-676.755.8165 or visiting the [address] [employer] website at www.[facility].gov/vaccinecompensation. There is a time limit to file a claim for compensation. 7. How can I learn more?  Ask your healthcare provider. He or she can give you the vaccine package insert or suggest other sources of information.  Call your local or state health department.  Contact the Centers for Disease Control and Prevention (CDC): 
- Call 9-477.415.4713 (1-800-CDC-INFO) or 
- Visit CDCs website at www.cdc.gov/flu Vaccine Information Statement Inactivated Influenza Vaccine 2015 
42 [place] [id number] Department of Health and [place] Centers for Disease Control and Prevention Office Use Only Vaccine Information Statement Tdap (Tetanus, Diphtheria, Pertussis) Vaccine: What You Need to Know Many Vaccine Information Statements are available in Macedonian and other languages. See www.immunize.org/vis. Hojas de Información Sobre Vacunas están disponibles en español y en muchos otros idiomas. Visite [employer].si 1. Why get vaccinated? Tetanus, diphtheria, and pertussis are very serious diseases. Tdap vaccine can protect us from these diseases. And, Tdap vaccine given to pregnant women can protect  babies against pertussis. TETANUS (Lockjaw) is rare in the [facility] today. It causes painful muscle tightening and stiffness, usually all over the body. ? It can lead to tightening of muscles in the head and neck so you cant open your mouth, swallow, or sometimes even breathe. Tetanus kills about 1 out of 10 people who are infected even after receiving the best medical care. DIPHTHERIA is also rare in the [facility] today. It can cause a thick coating to form in the back of the throat. ? It can lead to breathing problems, heart failure, paralysis, and death. PERTUSSIS (Whooping Cough) causes severe coughing spells, which can cause difficulty breathing, vomiting, and disturbed sleep. ? It can also lead to weight loss, incontinence, and rib fractures. Up to 2 in 100 adolescents and 5 in 100 adults with pertussis are hospitalized or have complications, which could include pneumonia or death. These diseases are caused by bacteria.  Diphtheria and pertussis are spread from person to person through secretions from coughing or sneezing. Tetanus enters the body through cuts, scratches, or wounds. Before vaccines, as many as 200,000 cases of diphtheria, 200,000 cases of pertussis, and hundreds of cases of tetanus, were reported in the [place] each year. Since vaccination began, reports of cases for tetanus and diphtheria have dropped by about 99% and for pertussis by about 80%. 2. Tdap vaccine Tdap vaccine can protect adolescents and adults from tetanus, diphtheria, and pertussis. One dose of Tdap is routinely given at age 6 or 15. People who did not get Tdap at that age should get it as soon as possible. Tdap is especially important for health care professionals and anyone having close contact with a baby younger than 12 months. Pregnant women should get a dose of Tdap during every pregnancy, to protect the  from pertussis. Infants are most at risk for severe, life-threatening complications from pertussis. Another vaccine, called Td, protects against tetanus and diphtheria, but not pertussis. A Td booster should be given every 10 years. Tdap may be given as one of these boosters if you have never gotten Tdap before. Tdap may also be given after a severe cut or burn to prevent tetanus infection. Your doctor or the person giving you the vaccine can give you more information. Tdap may safely be given at the same time as other vaccines. 3. Some people should not get this vaccine  A person who has ever had a life-threatening allergic reaction after a previous dose of any diphtheria, tetanus or pertussis containing vaccine, OR has a severe allergy to any part of this vaccine, should not get Tdap vaccine. Tell the person giving the vaccine about any severe allergies.  
 
 Anyone who had coma or long repeated seizures within 7 days after a childhood dose of DTP or DTaP, or a previous dose of Tdap, should not get Tdap, unless a cause other than the vaccine was found. They can still get Td.  Talk to your doctor if you: 
- have seizures or another nervous system problem, 
- had severe pain or swelling after any vaccine containing diphtheria, tetanus or pertussis,  
- ever had a condition called Guillain Barré Syndrome (GBS), 
- arent feeling well on the day the shot is scheduled. 4. Risks With any medicine, including vaccines, there is a chance of side effects. These are usually mild and go away on their own. Serious reactions are also possible but are rare. Most people who get Tdap vaccine do not have any problems with it. Mild Problems following Tdap 
(Did not interfere with activities)  Pain where the shot was given (about 3 in 4 adolescents or 2 in 3 adults)  Redness or swelling where the shot was given (about 1 person in 5)  Mild fever of at least 100.4°F (up to about 1 in 25 adolescents or 1 in 100 adults)  Headache (about 3 or 4 people in 10)  Tiredness (about 1 person in 3 or 4)  Nausea, vomiting, diarrhea, stomach ache (up to 1 in 4 adolescents or 1 in 10 adults)  Chills,  sore joints (about 1 person in 10)  Body aches (about 1 person in 3 or 4)  Rash, swollen glands (uncommon) Moderate Problems following Tdap (Interfered with activities, but did not require medical attention)  Pain where the shot was given (up to 1 in 5 or 6)  Redness or swelling where the shot was given (up to about 1 in 16 adolescents or 1 in 12 adults)  Fever over 102°F (about 1 in 100 adolescents or 1 in 250 adults)  Headache (about 1 in 7 adolescents or 1 in 10 adults)  Nausea, vomiting, diarrhea, stomach ache (up to 1 or 3 people in 100)  Swelling of the entire arm where the shot was given (up to about 1 in 500). Severe Problems following Tdap 
(Unable to perform usual activities; required medical attention)  Swelling, severe pain, bleeding, and redness in the arm where the shot was given (rare). Problems that could happen after any vaccine:  People sometimes faint after a medical procedure, including vaccination. Sitting or lying down for about 15 minutes can help prevent fainting, and injuries caused by a fall. Tell your doctor if you feel dizzy, or have vision changes or ringing in the ears.  Some people get severe pain in the shoulder and have difficulty moving the arm where a shot was given. This happens very rarely.  Any medication can cause a severe allergic reaction. Such reactions from a vaccine are very rare, estimated at fewer than 1 in a million doses, and would happen within a few minutes to a few hours after the vaccination. As with any medicine, there is a very remote chance of a vaccine causing a serious injury or death. The safety of vaccines is always being monitored. For more information, visit: www.cdc.gov/vaccinesafety/ 
 
5. What if there is a serious problem? What should I look for?  Look for anything that concerns you, such as signs of a severe allergic reaction, very high fever, or unusual behavior.  Signs of a severe allergic reaction can include hives, swelling of the face and throat, difficulty breathing, a fast heartbeat, dizziness, and weakness. These would usually start a few minutes to a few hours after the vaccination. What should I do?  If you think it is a severe allergic reaction or other emergency that cant wait, call 9-1-1 or get the person to the nearest hospital. Otherwise, call your doctor.  Afterward, the reaction should be reported to the Vaccine Adverse Event Reporting System (VAERS). Your doctor might file this report, or you can do it yourself through the VAERS web site at www.vaers. hhs.gov, or by calling 8-745.871.6368. VAERS does not give medical advice.  
 
6. The National Vaccine Injury Compensation Program 
 
The [facility] Vaccine Injury Compensation Program (VICP) is a federal program that was created to compensate people who may have been injured by certain vaccines. Persons who believe they may have been injured by a vaccine can learn about the program and about filing a claim by calling 1-629.441.3983 or visiting the [employer] website at www.[facility].gov/vaccinecompensation. There is a time limit to file a claim for compensation. 7. How can I learn more?  Ask your doctor. He or she can give you the vaccine package insert or suggest other sources of information.  Call your local or state health department.  Contact the Centers for Disease Control and Prevention (CDC): 
- Call 0-858.247.5664 (1-800-CDC-INFO) or 
- Visit CDCs website at www.cdc.gov/vaccines Vaccine Information Statement Tdap Vaccine 
(2/24/2015) 42 LYNN Nuñez [id number] Department of Health and [employer] Centers for Disease Control and Prevention Office Use Only

## 2018-11-27 NOTE — PROGRESS NOTES
Chief Complaint Patient presents with  Hypertension Follow Up Pt states has \"itchy\" spots on elbows and ankles that comes and goes at random. 1. Have you been to the ER, urgent care clinic since your last visit? Hospitalized since your last visit? No 
 
2. Have you seen or consulted any other health care providers outside of the [address] [place] [address] since your last visit? Include any pap smears or colon screening. Yes, Pulmonary is Aug, pt informed to return in 1 year. Pt accepts flu shot and TDAP shot and PCV13 shot. Verbal Order with Readback given by Zora James NP for Influenza. Given in Left Deltoid without difficulty. Verbal Order with Readback given by Zora James NP for TDAP. Given in Right Deltoid without difficulty. Verbal Order with Readback given by Zora James NP for PCV13. Given in Left Deltoid without difficulty. Health Maintenance Due Topic Date Due  
 DTaP/Tdap/Td series (1 - Tdap) 10/24/1973  Shingrix Vaccine Age 50> (1 of 2) 10/24/2002  FOBT Q 1 YEAR AGE 50-75  10/24/2002  Pneumococcal 65+ Low/Medium Risk (2 of 2 - PPSV23) 10/24/2017  MEDICARE YEARLY EXAM  04/27/2018  Influenza Age 5 to Adult  08/01/2018

## 2018-11-28 LAB
ALBUMIN SERPL-MCNC: 4.7 G/DL (ref 3.6–4.8)
ALBUMIN/GLOB SERPL: 1.7 {RATIO} (ref 1.2–2.2)
ALP SERPL-CCNC: 86 IU/L (ref 39–117)
ALT SERPL-CCNC: 6 IU/L (ref 0–32)
AST SERPL-CCNC: 20 IU/L (ref 0–40)
BILIRUB SERPL-MCNC: 0.4 MG/DL (ref 0–1.2)
BUN SERPL-MCNC: 13 MG/DL (ref 8–27)
BUN/CREAT SERPL: 13 (ref 12–28)
CALCIUM SERPL-MCNC: 9.7 MG/DL (ref 8.7–10.3)
CHLORIDE SERPL-SCNC: 97 MMOL/L (ref 96–106)
CO2 SERPL-SCNC: 28 MMOL/L (ref 20–29)
CREAT SERPL-MCNC: 0.97 MG/DL (ref 0.57–1)
GLOBULIN SER CALC-MCNC: 2.8 G/DL (ref 1.5–4.5)
GLUCOSE SERPL-MCNC: 95 MG/DL (ref 65–99)
POTASSIUM SERPL-SCNC: 4.1 MMOL/L (ref 3.5–5.2)
PROT SERPL-MCNC: 7.5 G/DL (ref 6–8.5)
SODIUM SERPL-SCNC: 134 MMOL/L (ref 134–144)

## 2019-05-28 ENCOUNTER — OFFICE VISIT (OUTPATIENT)
Dept: FAMILY MEDICINE CLINIC | Age: 69
End: 2019-05-28

## 2019-05-28 ENCOUNTER — HOSPITAL ENCOUNTER (OUTPATIENT)
Dept: LAB | Age: 69
Discharge: HOME | End: 2019-05-28
Payer: MEDICARE

## 2019-05-28 VITALS
OXYGEN SATURATION: 98 % | TEMPERATURE: 97.5 F | BODY MASS INDEX: 27.61 KG/M2 | WEIGHT: 155.8 LBS | SYSTOLIC BLOOD PRESSURE: 126 MMHG | HEIGHT: 63 IN | RESPIRATION RATE: 18 BRPM | HEART RATE: 67 BPM | DIASTOLIC BLOOD PRESSURE: 80 MMHG

## 2019-05-28 DIAGNOSIS — M85.89 OSTEOPENIA OF MULTIPLE SITES: ICD-10-CM

## 2019-05-28 DIAGNOSIS — Z12.11 COLON CANCER SCREENING: ICD-10-CM

## 2019-05-28 DIAGNOSIS — I10 ESSENTIAL HYPERTENSION: Primary | ICD-10-CM

## 2019-05-28 DIAGNOSIS — D86.9 SARCOIDOSIS: ICD-10-CM

## 2019-05-28 LAB
BILIRUB UR QL STRIP: NEGATIVE
GLUCOSE UR-MCNC: NEGATIVE MG/DL
KETONES P FAST UR STRIP-MCNC: NEGATIVE MG/DL
PH UR STRIP: 5 [PH] (ref 4.6–8)
PROT UR QL STRIP: NEGATIVE
SP GR UR STRIP: 1.02 (ref 1–1.03)
UA UROBILINOGEN AMB POC: NORMAL (ref 0.2–1)
URINALYSIS CLARITY POC: CLEAR
URINALYSIS COLOR POC: YELLOW
URINE BLOOD POC: NEGATIVE
URINE LEUKOCYTES POC: NORMAL
URINE NITRITES POC: NEGATIVE

## 2019-05-28 PROCEDURE — 80061 LIPID PANEL: CPT

## 2019-05-28 PROCEDURE — 82306 VITAMIN D 25 HYDROXY: CPT

## 2019-05-28 PROCEDURE — 36415 COLL VENOUS BLD VENIPUNCTURE: CPT

## 2019-05-28 PROCEDURE — 80053 COMPREHEN METABOLIC PANEL: CPT

## 2019-05-28 PROCEDURE — 85025 COMPLETE CBC W/AUTO DIFF WBC: CPT

## 2019-05-28 NOTE — PROGRESS NOTES
HISTORY OF PRESENT ILLNESS  Amber Woods is a 79 y.o. female. HPI  Patient comes in today for follow up. Hx pulmonary sarcoid. saw Dr. Myra Weston in August 2018, had CXR and PFTs. Given clear to follow up in 1 year. lungs were stable. Dr. Roselyn Ackerman - eye appointment in July 8, 2019. Hx of osteopenia, will be seeing Dr. Brayden Soto 6/4/19, saw rheumatology in June 2018. they recommended stop taking fosamax, ordered DEXA, and if still needed therapy, would switch to Prolia. .  Had DEXA scan in July 2018, recommended to stay off fosamax and take calcium with vitamin D.  Plan to repeat DEXA in 2020. GYN provider. Gema Moise told she no longer needed paps after 2015. Alvina Lubin had abnormal pap in 1980s. Colonoscopy in 2009 with Dr. Sharon Grace. Michele Ruelas no polyps were found, recalls being told 10 year follow up. Interested in cologuard  Hx HTN, states compliance with medications.  Tries to follow low sodium diet.  Denies chest pains, palpitations, dizziness, dyspnea, tingling in extremities.  Urinating without difficulty.   Allergies   Allergen Reactions    Lisinopril Swelling and Cough     Lips swell    Pcn [Penicillins] Hives    Hydrochlorothiazide Unknown (comments)     NA down, fatigue , myalgia       Past Medical History:   Diagnosis Date    Arthritis     OA    COPD     d/t sarcoidosis    Hypertension     Hyponatremia 2009    FOLLOWING TREATMENT WITH HCTZ; NOW RESOLVED 9/24/12    Osteopenia     Sarcoidosis     nonsymptomatic       Past Surgical History:   Procedure Laterality Date    BONE MARROW ASPIRATE &BIOPSY  1979    SARCOIDOSIS DIAGNOSED    HX DILATION AND CURETTAGE  1992    HX HIP REPLACEMENT  2012    right    HX HIP REPLACEMENT Left 08/26/2016    HX ORTHOPAEDIC      excision right wrist cyst    HX OTHER SURGICAL  1979    BRONCHOSCOPY    HX TUBAL LIGATION  1988       Social History     Socioeconomic History    Marital status:      Spouse name: Not on file    Number of children: Not on file    Years of education: Not on file    Highest education level: Not on file   Occupational History    Not on file   Social Needs    Financial resource strain: Not on file    Food insecurity:     Worry: Not on file     Inability: Not on file    Transportation needs:     Medical: Not on file     Non-medical: Not on file   Tobacco Use    Smoking status: Former Smoker     Packs/day: 0.50     Years: 30.00     Pack years: 15.00     Last attempt to quit: 2012     Years since quittin.0    Smokeless tobacco: Never Used   Substance and Sexual Activity    Alcohol use: Yes     Alcohol/week: 4.2 - 6.0 oz     Types: 7 - 10 Cans of beer per week     Comment: 3-4 beers daily, may have 6 beers on weekends    Drug use: No    Sexual activity: Never   Lifestyle    Physical activity:     Days per week: Not on file     Minutes per session: Not on file    Stress: Not on file   Relationships    Social connections:     Talks on phone: Not on file     Gets together: Not on file     Attends Yazidism service: Not on file     Active member of club or organization: Not on file     Attends meetings of clubs or organizations: Not on file     Relationship status: Not on file    Intimate partner violence:     Fear of current or ex partner: Not on file     Emotionally abused: Not on file     Physically abused: Not on file     Forced sexual activity: Not on file   Other Topics Concern    Not on file   Social History Narrative    Retired from [employer]. .   3 children, 2 sons, 1 daughter       Family History   Problem Relation Age of Onset   [facility] Arthritis-rheumatoid Mother     Hypertension Mother     Elevated Lipids Mother     Cancer Mother         bladder    Stroke Mother     Elevated Lipids Sister     Hypertension Sister     Diabetes Sister     Elevated Lipids Sister     Hypertension Sister     Other Son         SARCOIDOSIS, KERATOCONUS    Other Daughter         KERATOCONUS    Anesth Problems Neg Hx Current Outpatient Medications   Medication Sig    candesartan (ATACAND) 16 mg tablet TAKE 1 TABLET BY MOUTH DAILY    atenolol (TENORMIN) 50 mg tablet TAKE 1 TABLET BY MOUTH DAILY    amLODIPine (NORVASC) 5 mg tablet TAKE 1 TABLET BY MOUTH DAILY    multivitamin (ONE A DAY) tablet Take 1 Tab by mouth daily.  calcium carbonate-vitamin D3 1,000 mg(2,500 mg)-800 unit Tab Take  by mouth daily. No current facility-administered medications for this visit. Review of Systems   Constitutional: Negative for chills, diaphoresis, fever, malaise/fatigue and weight loss. HENT: Negative. Respiratory: Negative for cough and shortness of breath. Cardiovascular: Negative for chest pain, palpitations and leg swelling. Gastrointestinal: Negative for abdominal pain, blood in stool, constipation, diarrhea, nausea and vomiting. Genitourinary: Negative for dysuria, flank pain, frequency, hematuria and urgency. Musculoskeletal: Negative for back pain, joint pain and myalgias. Skin: Negative. Neurological: Negative for dizziness, tingling, sensory change, speech change, focal weakness and headaches. Psychiatric/Behavioral: Negative for depression. The patient is not nervous/anxious and does not have insomnia. Vitals:    05/28/19 1149   BP: 126/80   Pulse: 67   Resp: 18   Temp: 97.5 °F (36.4 °C)   TempSrc: Oral   SpO2: 98%   Weight: 155 lb 12.8 oz (70.7 kg)   Height: 5' 3\" (1.6 m)     Physical Exam   Constitutional: She is oriented to person, place, and time. Vital signs are normal. She appears well-developed and well-nourished. She is cooperative. Neck: No thyromegaly present. Cardiovascular: Normal rate, regular rhythm, S1 normal, S2 normal and normal heart sounds. No murmur heard. Pulses:       Dorsalis pedis pulses are 2+ on the right side, and 2+ on the left side. No edema noted   Pulmonary/Chest: Effort normal and breath sounds normal. She has no decreased breath sounds.  She has no wheezes. She has no rhonchi. She has no rales. Abdominal: Soft. Normal appearance and bowel sounds are normal. There is no hepatosplenomegaly. There is no tenderness. There is no CVA tenderness. Neurological: She is alert and oriented to person, place, and time. Skin: Skin is warm, dry and intact. Psychiatric: She has a normal mood and affect. Her speech is normal and behavior is normal. Thought content normal.   Vitals reviewed. ASSESSMENT and PLAN    ICD-10-CM ICD-9-CM    1. Essential hypertension I10 401.9 CBC WITH AUTOMATED DIFF      METABOLIC PANEL, COMPREHENSIVE      LIPID PANEL      AMB POC URINALYSIS DIP STICK AUTO W/O MICRO   2. Sarcoidosis,lung D86.9 135    3. Osteopenia of multiple sites M85.89 733.90 VITAMIN D, 25 HYDROXY   4. Colon cancer screening Z12.11 V76.51 COLOGUARD TEST (FECAL DNA COLORECTAL CANCER SCREENING)     Encounter Diagnoses   Name Primary?  Essential hypertension Yes    Sarcoidosis,lung     Osteopenia of multiple sites     Colon cancer screening      Orders Placed This Encounter    CBC WITH AUTOMATED DIFF    METABOLIC PANEL, COMPREHENSIVE    LIPID PANEL    VITAMIN D, 25 HYDROXY    COLOGUARD TEST (FECAL DNA COLORECTAL CANCER SCREENING)    AMB POC URINALYSIS DIP STICK AUTO W/O MICRO     Diagnoses and all orders for this visit:    1. Essential hypertension - stable. Continue current medications. -     CBC WITH AUTOMATED DIFF  -     METABOLIC PANEL, COMPREHENSIVE  -     LIPID PANEL  -     AMB POC URINALYSIS DIP STICK AUTO W/O MICRO    2. Sarcoidosis,lung - followed by pulmonary - stable    3. Osteopenia of multiple sites - has been off fosamax for about 1 year. Needs to repeat DEXA in 1 year  -     VITAMIN D, 25 HYDROXY    4. Colon cancer screening  -     COLOGUARD TEST (FECAL DNA COLORECTAL CANCER SCREENING)      Follow-up and Dispositions    · Return in about 6 months (around 11/28/2019), or if symptoms worsen or fail to improve.          I have reviewed the patient's allergies and made any necessary changes. Medical, procedural, social and family histories have been reviewed and updated as medically indicated. I have reconciled and/or revised patient medications in the EMR. I have discussed each diagnosis listed in this note with Nena Rankin and/or their family. I have discussed treatment options and the risk/benefit analysis of those options, including safe use of medications and possible medication side effects. Through the use of shared decision making we have agreed to the above plan. The patient has received an after-visit summary and questions were answered concerning future plans. Denisse Hammer, AVIP-C    This note will not be viewable in [employer]

## 2019-05-28 NOTE — PROGRESS NOTES
Chief Complaint   Patient presents with    Hypertension     1. Have you been to the ER, urgent care clinic since your last visit? Hospitalized since your last visit? No    2. Have you seen or consulted any other health care providers outside of the [address] [place] [address] since your last visit? Include any pap smears or colon screening.  No     [facility]    Health Maintenance Due   Topic Date Due    Shingrix Vaccine Age 50> (1 of 2) 10/24/2002    COLONOSCOPY  01/01/2019    GLAUCOMA SCREENING Q2Y  04/10/2019

## 2019-05-28 NOTE — PATIENT INSTRUCTIONS
Learning About Low Bone Density What is low bone density? Low bone density (sometimes called osteopenia) is a decrease in thickness, or density, in bones. That means the bones become thinner and weaker. It is much more common in women than in men. It is an early form of osteoporosis, a condition in which the bones are so thin and weak that they can break easily. It's important to know that low bone density is not a disease. It can happen normally with aging. Having low bone density means that there is a greater risk that you may get osteoporosis. It also means that you are more likely to break a bone than someone who does not have low bone density. But not everyone with low bone density gets osteoporosis or breaks a bone. Low bone density doesn't cause any symptoms. It's usually found with a type of X-ray called a bone density test. Low bone density means that your bone density result (T-score) is between 1.0 and 2.5. What increases your risk for low bone density? Things that increase your risk include: 
· Having a family history of osteoporosis. · Being thin. · Being white or . · Getting too little physical activity. · Smoking. · Drinking too much alcohol often. · Using certain medicines such as steroids. How can you prevent osteoporosis? There are things you can do to help prevent osteoporosis. Certain lifestyle changes will help slow the loss of bone density. · Eat food that has plenty of calcium and vitamin D. Yogurt, cheese, milk, and dark green vegetables are high in calcium. Eggs, fatty fish, cereal, and fortified milk are high in vitamin D. 
· Talk to your doctor about taking a calcium supplement that has vitamin D in it. · Get regular exercise. ? Do 30 minutes of weight-bearing exercise on most days of the week. Walking, jogging, stair climbing, and dancing are good choices. ? Do resistance exercises with weights or elastic bands 2 or 3 days a week. · Limit alcohol to 2 drinks a day for men and 1 drink a day for women. Too much alcohol can cause health problems. · Do not smoke. Smoking can make bones thin faster. If you need help quitting, talk to your doctor about stop-smoking programs and medicines. These can increase your chances of quitting for good. Prescription medicines are available for treating low bone density. But these are more often used to treat osteoporosis. Follow-up care is a key part of your treatment and safety. Be sure to make and go to all appointments, and call your doctor if you are having problems. It's also a good idea to know your test results and keep a list of the medicines you take. Where can you learn more? Go to http://janeth-hailey.info/. Enter H079 in the search box to learn more about \"Learning About Low Bone Density. \" Current as of: March 15, 2018 Content Version: 11.9 © 5620-7070 [employer]. Care instructions adapted under license by [employer] (which disclaims liability or warranty for this information). If you have questions about a medical condition or this instruction, always ask your healthcare professional. [place] [address] any warranty or liability for your use of this information.

## 2019-05-29 LAB
25(OH)D3+25(OH)D2 SERPL-MCNC: 52.7 NG/ML (ref 30–100)
ALBUMIN SERPL-MCNC: 4.6 G/DL (ref 3.6–4.8)
ALBUMIN/GLOB SERPL: 1.5 {RATIO} (ref 1.2–2.2)
ALP SERPL-CCNC: 120 IU/L (ref 39–117)
ALT SERPL-CCNC: 9 IU/L (ref 0–32)
AST SERPL-CCNC: 23 IU/L (ref 0–40)
BASOPHILS # BLD AUTO: 0 X10E3/UL (ref 0–0.2)
BASOPHILS NFR BLD AUTO: 0 %
BILIRUB SERPL-MCNC: 0.3 MG/DL (ref 0–1.2)
BUN SERPL-MCNC: 6 MG/DL (ref 8–27)
BUN/CREAT SERPL: 7 (ref 12–28)
CALCIUM SERPL-MCNC: 9.9 MG/DL (ref 8.7–10.3)
CHLORIDE SERPL-SCNC: 96 MMOL/L (ref 96–106)
CHOLEST SERPL-MCNC: 148 MG/DL (ref 100–199)
CO2 SERPL-SCNC: 23 MMOL/L (ref 20–29)
CREAT SERPL-MCNC: 0.81 MG/DL (ref 0.57–1)
EOSINOPHIL # BLD AUTO: 0.2 X10E3/UL (ref 0–0.4)
EOSINOPHIL NFR BLD AUTO: 3 %
ERYTHROCYTE [DISTWIDTH] IN BLOOD BY AUTOMATED COUNT: 12.6 % (ref 12.3–15.4)
GLOBULIN SER CALC-MCNC: 3 G/DL (ref 1.5–4.5)
GLUCOSE SERPL-MCNC: 97 MG/DL (ref 65–99)
HCT VFR BLD AUTO: 34.6 % (ref 34–46.6)
HDLC SERPL-MCNC: 77 MG/DL
HGB BLD-MCNC: 11.5 G/DL (ref 11.1–15.9)
IMM GRANULOCYTES # BLD AUTO: 0 X10E3/UL (ref 0–0.1)
IMM GRANULOCYTES NFR BLD AUTO: 0 %
INTERPRETATION, 910389: NORMAL
LDLC SERPL CALC-MCNC: 51 MG/DL (ref 0–99)
LYMPHOCYTES # BLD AUTO: 1.8 X10E3/UL (ref 0.7–3.1)
LYMPHOCYTES NFR BLD AUTO: 28 %
MCH RBC QN AUTO: 28.8 PG (ref 26.6–33)
MCHC RBC AUTO-ENTMCNC: 33.2 G/DL (ref 31.5–35.7)
MCV RBC AUTO: 87 FL (ref 79–97)
MONOCYTES # BLD AUTO: 0.6 X10E3/UL (ref 0.1–0.9)
MONOCYTES NFR BLD AUTO: 10 %
NEUTROPHILS # BLD AUTO: 3.8 X10E3/UL (ref 1.4–7)
NEUTROPHILS NFR BLD AUTO: 59 %
PLATELET # BLD AUTO: 373 X10E3/UL (ref 150–450)
POTASSIUM SERPL-SCNC: 4.6 MMOL/L (ref 3.5–5.2)
PROT SERPL-MCNC: 7.6 G/DL (ref 6–8.5)
RBC # BLD AUTO: 3.99 X10E6/UL (ref 3.77–5.28)
SODIUM SERPL-SCNC: 137 MMOL/L (ref 134–144)
TRIGL SERPL-MCNC: 102 MG/DL (ref 0–149)
VLDLC SERPL CALC-MCNC: 20 MG/DL (ref 5–40)
WBC # BLD AUTO: 6.5 X10E3/UL (ref 3.4–10.8)

## 2019-06-04 ENCOUNTER — OFFICE VISIT (OUTPATIENT)
Dept: RHEUMATOLOGY | Age: 69
End: 2019-06-04

## 2019-06-04 VITALS
HEIGHT: 63 IN | WEIGHT: 154 LBS | TEMPERATURE: 98 F | BODY MASS INDEX: 27.29 KG/M2 | HEART RATE: 75 BPM | DIASTOLIC BLOOD PRESSURE: 80 MMHG | SYSTOLIC BLOOD PRESSURE: 128 MMHG | RESPIRATION RATE: 18 BRPM

## 2019-06-04 DIAGNOSIS — Z78.0 POST-MENOPAUSAL: ICD-10-CM

## 2019-06-04 DIAGNOSIS — M85.9 DISORDER OF BONE DENSITY AND STRUCTURE, UNSPECIFIED: Primary | ICD-10-CM

## 2019-06-04 DIAGNOSIS — M89.9 DISEASE OF BONE: ICD-10-CM

## 2019-06-04 NOTE — PATIENT INSTRUCTIONS
Will need DXA in 7/20/2020.     Please call the Patient Care Scheduling Team [phone] to schedule your test (DXA)

## 2019-06-04 NOTE — LETTER
6/4/19 Patient: Cheryl Caba YOB: 1950 Date of Visit: 6/4/2019 Lex Lopez NP 
[address] [place] [id number] VIA In Basket Dear Lex Lopez NP, Thank you for referring Ms. West Ring to [facility] for evaluation. My notes for this consultation are attached. If you have questions, please do not hesitate to call me. I look forward to following your patient along with you.  
 
 
Sincerely, 
 
Kai Eason MD

## 2019-06-04 NOTE — PROGRESS NOTES
Chief Complaint   Patient presents with    Osteopenia     1. Have you been to the ER, urgent care clinic since your last visit? Hospitalized since your last visit? No    2. Have you seen or consulted any other health care providers outside of the [address] [place] [address] since your last visit? Include any pap smears or colon screening.  No

## 2019-06-04 NOTE — PROGRESS NOTES
REASON FOR VISIT    This is the a follow up visit for Ms. Hawkins for Osteopenia. Osteoporosis Historical Synopsis    Height loss since age 29 (at least two inches): 1  Fracture history includes: no  Family history of hip fracture: no  Fall Risk: no    Daily calcium intake is 1200 mg  Daily vitamin D intake is 800 IU    Smoking history: yes (quit 2012)  Alcohol consumption: yes (three to four - 12 ounce beers about 4 days per week)  Prednisone history: yes (history of chronic daily prednisone [id number] for sarcoidosis of lung)    Exercise: yes    Previous work-up for osteoporosis includes the following:  DEXA Scan: 7/16/2018  Vitamin 25OH D level: 52.7 (5/28/2019)  PTH: 35 (6/06/2018  TSH: 2.830 (5/23/2018)    Therapy History includes:    Current osteoporosis therapy includes:   Prior osteoporosis therapy includes: alendronate 70 mg weekly (6/2013 - 6/06/2018; 5 years)  The following osteoporosis therapy have been ineffective: non  The following osteoporosis therapy were stopped because of side effects: none    HISTORY OF PRESENT ILLNESS      Ms. Umaña Poster presents for follow up. On her last visit with my PA on 6/06/2018, a repeat DXA was ordered and alendronate was discontinued to being on treatment for 5 years. Her DXA on 7/16/2018 showed distal one third left radius T score -0.3 (BMD 0.664 g/cm2). She had interval bilateral hip replacements. Today, she feels well and is without complaints. No falls or fractures since last visit. She denies fever, weight loss, blurred vision, vision loss, oral ulcers, ankle swelling, dry cough, dyspnea, nausea, vomiting, dysphagia, abdominal pain, black or bloody stool, fall since last visit, rash, easy bruising and increased thirst.    REVIEW OF SYSTEMS    A comprehensive review of systems was performed and pertinent results are documented in the HPI, review of systems is otherwise non-contributory.     PAST MEDICAL HISTORY    She has a past medical history of Arthritis, COPD, Hypertension, Hyponatremia (2009), Osteopenia, and Sarcoidosis. FAMILY HISTORY    Her family history includes Arthritis-rheumatoid in her mother; Cancer in her mother; Diabetes in her sister; Elevated Lipids in her mother, sister, and sister; Hypertension in her mother, sister, and sister; Other in her daughter and son; Stroke in her mother. SOCIAL HISTORY    She reports that she quit smoking about 7 years ago. She has a 15.00 pack-year smoking history. She has never used smokeless tobacco. She reports that she drinks about 4.2 - 6.0 oz of alcohol per week. She reports that she does not use drugs. MEDICATIONS    Current Outpatient Medications   Medication Sig Dispense Refill    candesartan (ATACAND) 16 mg tablet TAKE 1 TABLET BY MOUTH DAILY 90 Tab 3    atenolol (TENORMIN) 50 mg tablet TAKE 1 TABLET BY MOUTH DAILY 90 Tab 3    amLODIPine (NORVASC) 5 mg tablet TAKE 1 TABLET BY MOUTH DAILY 90 Tab 3    multivitamin (ONE A DAY) tablet Take 1 Tab by mouth daily.  calcium carbonate-vitamin D3 1,000 mg(2,500 mg)-800 unit Tab Take  by mouth daily. ALLERGIES    Allergies   Allergen Reactions    Lisinopril Swelling and Cough     Lips swell    Pcn [Penicillins] Hives    Hydrochlorothiazide Unknown (comments)     NA down, fatigue , myalgia       PHYSICAL EXAMINATION    Visit Vitals  /80   Pulse 75   Temp 98 °F (36.7 °C)   Resp 18   Ht 5' 3\" (1.6 m)   Wt 154 lb (69.9 kg)   BMI 27.28 kg/m²     Body mass index is 27.28 kg/m². General: Patient is alert, oriented x 3, not in acute distress    HEENT:   Conjunctiva are not injected and appear moist, there is no alopecia. Chest:  Breathing comfortably at room air    Neurological exam:  Muscle strength is full in upper and lower extremities. Skin exam:  There are no rashes, no active Raynaud's     Musculoskeletal exam:  No synovitis.     DATA REVIEW    Laboratory     Recent laboratory results were reviewed, summarized, and discussed with the patient. Imaging    Musculoskeletal Ultrasound    None    Radiographs    None    CT Imaging    None    MR Imaging    None    DXA     DXA 7/16/2018: (excluded Both hips for replacement) lumbar spine L1-L4 T score 0.1 (BMD 1.156 g/cm2), distal one third left radius T score -0.3 (BMD 0.664 g/cm2). FRAX score 5.3 % probability in 10 years for major osteoporotic fracture and 0.8 % 10 year probability of hip fracture. I personally reviewed the images of this study. DXA 5/12/2015: (excluded right femoral neck, right total hip) lumbar spine L1-L4 T score -0.1 (BMD 1.137 g/cm2), left femoral neck T score: -2.0 (0.661 g/cm2), left total hip T score: -1.6 (0.784 g/cm2), and distal one third left radius T score -0.2 (BMD 0.684 g/cm2). FRAX score N/A % probability in 10 years for major osteoporotic fracture and N/A % 10 year probability of hip fracture. DXA 2/01/2013: (excluded right femoral hip, right total hip) lumbar spine L1-L4 T score -1.0 (BMD 1.041 g/cm2), left femoral neck T score: -2.3 (0.624 g/cm2), left total hip T score: -1.7 (0.773 g/cm2), and distal one third left radius T score -0.1 (BMD 0.688 g/cm2). FRAX score 4.3 % probability in 10 years for major osteoporotic fracture and 0.9 % 10 year probability of hip fracture. ASSESSMENT AND PLAN    1) Post-Menopausal with Abnormal Bone Density. Her most recent DXA scan 7/16/2018 showed a T-score of distal one third left radius T score -0.3 (BMD 0.664 g/cm2). She had bilateral hip replacements. Her DXA was normal. She will repeat a DXA in 7/2020. I will follow up with her 8/2020. No history of fracture. She has been on alendronate 70 mg weekly 6/2013 - present, which is a total of 5 years.  Dayo Gross al reported that the risk of atypical femoral fractures is low, with an incidence of up to 50 per 100,000 person-years during the first 5 years of bisphosphonate use, resulting in a clear positive benefit/risk ratio within this time frame (J Bone Blairs Res. 2016 Jan;31(1):16-35). 2) Vitamin D Deficiency. Her vitamin D level was 52.7 (previously 31.2, 15). The patient voiced understanding of the aforementioned assessment and plan. Summary of plan was provided in the After Visit Summary patient instructions.      TODAY'S ORDERS    Orders Placed This Encounter    DEXA BONE DENSITY STUDY AXIAL       Future Appointments   Date Time Provider Department Center   11/27/2019 10:30 AM Billy Avalos NP [facility]   8/4/2020 10:40 AM Chidi Day MD [place], [place], [facility]    Adult Rheumatology   Rheumatology Ultrasound Certified  [address] [facility] [address]  [address] [address]   Phone [phone]  Fax [phone]

## 2019-06-13 NOTE — PROGRESS NOTES
RECOMMENDATIONS:  Liver and kidney function normal.  Blood counts normal (not anemic). Cholesterol numbers look great!   Vitamin D normal.

## 2019-07-19 ENCOUNTER — HOSPITAL ENCOUNTER (OUTPATIENT)
Dept: MAMMOGRAPHY | Age: 69
Discharge: HOME | End: 2019-07-19
Payer: MEDICARE

## 2019-07-19 DIAGNOSIS — Z12.39 SCREENING BREAST EXAMINATION: ICD-10-CM

## 2019-07-19 PROCEDURE — 77067 SCR MAMMO BI INCL CAD: CPT

## 2019-07-19 NOTE — PROGRESS NOTES
Patient to have additional imaging.   Make sure imaging center contacts her to schedule dx right mammo and US

## 2019-07-29 ENCOUNTER — HOSPITAL ENCOUNTER (OUTPATIENT)
Dept: MAMMOGRAPHY | Age: 69
Discharge: HOME | End: 2019-07-29
Payer: MEDICARE

## 2019-07-29 DIAGNOSIS — R92.8 ABNORMALITY OF RIGHT BREAST ON SCREENING MAMMOGRAM: ICD-10-CM

## 2019-07-29 PROCEDURE — 77061 BREAST TOMOSYNTHESIS UNI: CPT

## 2019-07-30 NOTE — PROGRESS NOTES
RECOMMENDATIONS:  Copy of mammogram report. No signs of breast cancer. Repeat in 1 year (with 3D mammogram if possible due to dense breasts).

## 2019-08-06 ENCOUNTER — HOSPITAL ENCOUNTER (OUTPATIENT)
Dept: GENERAL RADIOLOGY | Age: 69
Discharge: HOME | End: 2019-08-06
Payer: MEDICARE

## 2019-08-06 DIAGNOSIS — D86.9 SARCOIDOSIS: ICD-10-CM

## 2019-08-06 PROCEDURE — 71046 X-RAY EXAM CHEST 2 VIEWS: CPT

## 2019-08-19 ENCOUNTER — OFFICE VISIT (OUTPATIENT)
Dept: FAMILY MEDICINE CLINIC | Age: 69
End: 2019-08-19

## 2019-08-19 VITALS
HEART RATE: 65 BPM | DIASTOLIC BLOOD PRESSURE: 73 MMHG | BODY MASS INDEX: 26.22 KG/M2 | OXYGEN SATURATION: 98 % | SYSTOLIC BLOOD PRESSURE: 117 MMHG | RESPIRATION RATE: 19 BRPM | WEIGHT: 148 LBS | HEIGHT: 63 IN | TEMPERATURE: 98.2 F

## 2019-08-19 DIAGNOSIS — L30.9 DERMATITIS: Primary | ICD-10-CM

## 2019-08-19 RX ORDER — ALBUTEROL SULFATE 90 UG/1
2 AEROSOL, METERED RESPIRATORY (INHALATION)
Refills: 6 | COMMUNITY
Start: 2019-08-14 | End: 2020-05-27 | Stop reason: ALTCHOICE

## 2019-08-19 RX ORDER — LATANOPROST 50 UG/ML
1 SOLUTION/ DROPS OPHTHALMIC
Refills: 4 | COMMUNITY
Start: 2019-07-08

## 2019-08-19 RX ORDER — PREDNISONE 20 MG/1
TABLET ORAL
Qty: 24 TAB | Refills: 0 | Status: SHIPPED | OUTPATIENT
Start: 2019-08-19 | End: 2019-09-09

## 2019-08-19 RX ORDER — MINERAL OIL
180 ENEMA (ML) RECTAL
Qty: 30 TAB | Refills: 1 | Status: SHIPPED | OUTPATIENT
Start: 2019-08-19

## 2019-08-19 NOTE — PATIENT INSTRUCTIONS

## 2019-08-19 NOTE — PROGRESS NOTES
Verified patient with two types of identifiers. Verified pharmacy with patient. Verified medications with the patient. 1. Have you been to the ER, urgent care clinic since your last visit? Hospitalized since your last visit? [facility]    2. Have you seen or consulted any other health care providers outside of the [address] [place] [address] since your last visit? Include any pap smears or colon screening.  No

## 2019-08-19 NOTE — PROGRESS NOTES
HISTORY OF PRESENT ILLNESS  Jos Bryant is a 79 y.o. female. HPI  This is a patient of Rob Humphrey with PMHx significant for hypertension, DJD, sarcoidosis who presents today for an acute visit with chief complaint of \"possible allergic reaction. \"    She reports that she went to [facility] on August 1. She had been having itchy bumps on both arms and her forehead and thought that this was related to insect bites She was diagnosed with dermatitis and sent home with a medrol dose-pack. Her symptoms did improve for a few days, but then returned as she was finishing out her 6-day dose-pack. She has been having them on both arms, on her left leg, and it has been itchy. They will come up and then go away on their own. She has tried Gold-Bond, cortisone, alcohol swabs, and calamine lotion without improvement in symptoms. She has also been taking benadryl. No environmental allergies that she is aware of. No new oral medications. Someone had been doing some work in her house and stirring up a lot of dust, but no other changes. No recent outdoor exposure. Visit Vitals  /73   Pulse 65   Temp 98.2 °F (36.8 °C) (Oral)   Resp 19   Ht 5' 3\" (1.6 m)   Wt 148 lb (67.1 kg)   SpO2 98%   BMI 26.22 kg/m²     Current Outpatient Medications on File Prior to Visit   Medication Sig Dispense Refill    albuterol (PROVENTIL HFA, VENTOLIN HFA, PROAIR HFA) 90 mcg/actuation inhaler Take 2 Puffs by inhalation every four to six (4-6) hours as needed. 6    latanoprost (XALATAN) 0.005 % ophthalmic solution Apply 1 Drop to eye nightly. 4    candesartan (ATACAND) 16 mg tablet TAKE 1 TABLET BY MOUTH DAILY 90 Tab 3    atenolol (TENORMIN) 50 mg tablet TAKE 1 TABLET BY MOUTH DAILY 90 Tab 3    amLODIPine (NORVASC) 5 mg tablet TAKE 1 TABLET BY MOUTH DAILY 90 Tab 3    multivitamin (ONE A DAY) tablet Take 1 Tab by mouth daily.  calcium carbonate-vitamin D3 1,000 mg(2,500 mg)-800 unit Tab Take  by mouth daily.          No current facility-administered medications on file prior to visit. Review of Systems   Constitutional: Negative for chills, fever and malaise/fatigue. Eyes: Negative for blurred vision and double vision. Respiratory: Negative for cough and shortness of breath. Cardiovascular: Negative for chest pain, palpitations, orthopnea and leg swelling. Skin: Positive for itching and rash. Neurological: Negative for dizziness and headaches. Physical Exam   Constitutional: She is oriented to person, place, and time. She appears well-developed and well-nourished. No distress. HENT:   Head: Normocephalic and atraumatic. Mouth/Throat: Oropharynx is clear and moist.   Cardiovascular: Normal rate, regular rhythm and normal heart sounds. Pulmonary/Chest: Effort normal and breath sounds normal. No respiratory distress. She has no wheezes. Neurological: She is alert and oriented to person, place, and time. Skin: Skin is warm and dry. She is not diaphoretic. Scattered erythematous papules on bilateral upper arms along with signs of excoriation; not diffuse   Psychiatric: She has a normal mood and affect. Her behavior is normal. Judgment normal.   Nursing note and vitals reviewed. ASSESSMENT and PLAN    ICD-10-CM ICD-9-CM    1. Dermatitis L30.9 692.9 predniSONE (DELTASONE) 20 mg tablet      fexofenadine (ALLEGRA) 180 mg tablet     I suspect that her steroid course as prescribed by [facility] was too short to alleviate her symptoms. Will start her on 3-week prednisone taper and start allegra daily. Encouraged her to continue the allegra after the prednisone taper if needed. If no improvement or if symptoms recur after completion of steroids, patient advised to contact me or her PCP for referral to allergy vs. dermatology, and she agrees to do so. Follow-up and Dispositions    · Return if symptoms worsen or fail to improve.

## 2019-11-27 ENCOUNTER — HOSPITAL ENCOUNTER (OUTPATIENT)
Dept: LAB | Age: 69
Discharge: HOME | End: 2019-11-27
Payer: MEDICARE

## 2019-11-27 ENCOUNTER — OFFICE VISIT (OUTPATIENT)
Dept: FAMILY MEDICINE CLINIC | Age: 69
End: 2019-11-27

## 2019-11-27 VITALS
DIASTOLIC BLOOD PRESSURE: 75 MMHG | RESPIRATION RATE: 18 BRPM | HEART RATE: 64 BPM | OXYGEN SATURATION: 100 % | SYSTOLIC BLOOD PRESSURE: 134 MMHG | TEMPERATURE: 97.1 F | BODY MASS INDEX: 25.83 KG/M2 | HEIGHT: 63 IN | WEIGHT: 145.8 LBS

## 2019-11-27 DIAGNOSIS — J30.9 ALLERGIC RHINITIS, UNSPECIFIED SEASONALITY, UNSPECIFIED TRIGGER: ICD-10-CM

## 2019-11-27 DIAGNOSIS — E07.89 THYROID FULLNESS: ICD-10-CM

## 2019-11-27 DIAGNOSIS — Z23 ENCOUNTER FOR IMMUNIZATION: ICD-10-CM

## 2019-11-27 DIAGNOSIS — I10 ESSENTIAL HYPERTENSION: Primary | ICD-10-CM

## 2019-11-27 DIAGNOSIS — R09.89 FOREIGN BODY SENSATION IN THROAT: ICD-10-CM

## 2019-11-27 DIAGNOSIS — D86.9 SARCOIDOSIS: ICD-10-CM

## 2019-11-27 PROCEDURE — 80053 COMPREHEN METABOLIC PANEL: CPT

## 2019-11-27 PROCEDURE — 84443 ASSAY THYROID STIM HORMONE: CPT

## 2019-11-27 RX ORDER — AZELASTINE 1 MG/ML
1 SPRAY, METERED NASAL 2 TIMES DAILY
Qty: 1 BOTTLE | Refills: 5 | Status: SHIPPED | OUTPATIENT
Start: 2019-11-27 | End: 2021-05-19 | Stop reason: ALTCHOICE

## 2019-11-27 NOTE — PROGRESS NOTES
Chief Complaint   Patient presents with    Hypertension     Pt states for a few months she has felt a \"itchy throat/cough\". That has gone to skin around neck breaking out. 1. Have you been to the ER, urgent care clinic since your last visit? Hospitalized since your last visit? No    2. Have you seen or consulted any other health care providers outside of the [address] [place] [address] since your last visit? Include any pap smears or colon screening. Yes, Pulmonary 08/2019    Pt accepts flu shot. Eye Exam - 07/2019 Dr. Arelis Shrestha with Readback given by Manjinder Jules NP for Influenza. Given in Left Deltoid without difficulty. Verbal Order with Readback given by Manjinder Jules NP for PPSV23. Given in Right Deltoid without difficulty.     Health Maintenance Due   Topic Date Due    Shingrix Vaccine Age 49> (1 of 2) 10/24/2002    GLAUCOMA SCREENING Q2Y  04/10/2019    Influenza Age 5 to Adult  08/01/2019    Pneumococcal 65+ years (2 of 2 - PPSV23) 11/27/2019    MEDICARE YEARLY EXAM  11/28/2019

## 2019-11-27 NOTE — PROGRESS NOTES
HISTORY OF PRESENT ILLNESS  Isauro Paul is a 81 y.o. female. HPI  Patient comes in today for follow up  Hx pulmonary sarcoid. saw Dr. Argenis Garcia in August 2019, had CXR and PFTs. Fiordaliza miller to follow up in 1 year.  lungs were stable. Has a foreign body sensation in throat, states worse in morning. Tried taking mucinex for scratchy throat. Uses cough drops, tea. Denies hoarseness. Given albuterol - did not help. Not taking antihistamine regularly. Denies GERD. No hx of thyroid disease. Dr. Ange Camara - eye appointment in July 8, 2019. Hx of osteopenia, will be seeing Dr. Sierra Tafoya 6/4/19, saw rheumatology in June 2018. Jennifer Pineda recommended stop taking fosamax, ordered DEXA, and if still needed therapy, would switch to Prolia. Juanpablogrzegorz Ly DEXA scan in July 2018, recommended to stay off fosamax and take calcium with vitamin D.  Plan to repeat DEXA in 2020. GYN provider. Tonny Brown told she no longer needed paps after 2015. Roger Putnam had abnormal pap in 1980s. Colonoscopy in 2009 with Dr. Karson Davis. Heidi Dove no polyps were found, recalls being told 10 year follow up. Interested in cologuard  Hx HTN, states compliance with medications.  Tries to follow low sodium diet.  Denies chest pains, palpitations, dizziness, dyspnea, tingling in extremities.  Urinating without difficulty.   Allergies   Allergen Reactions    Lisinopril Swelling and Cough     Lips swell    Pcn [Penicillins] Hives    Hydrochlorothiazide Unknown (comments)     NA down, fatigue , myalgia       Past Medical History:   Diagnosis Date    Arthritis     OA    COPD     d/t sarcoidosis    Hypertension     Hyponatremia 2009    FOLLOWING TREATMENT WITH HCTZ; NOW RESOLVED 9/24/12    Osteopenia     Post-menopause     Sarcoidosis     nonsymptomatic       Past Surgical History:   Procedure Laterality Date    BONE MARROW ASPIRATE &BIOPSY  1979    SARCOIDOSIS DIAGNOSED    HX DILATION AND CURETTAGE  1992    HX HIP REPLACEMENT  2012    right    HX HIP REPLACEMENT Left 2016    HX ORTHOPAEDIC      excision right wrist cyst    HX OTHER SURGICAL  1979    BRONCHOSCOPY    HX TUBAL LIGATION  1988       Social History     Socioeconomic History    Marital status:      Spouse name: Not on file    Number of children: Not on file    Years of education: Not on file    Highest education level: Not on file   Occupational History    Not on file   Social Needs    Financial resource strain: Not on file    Food insecurity:     Worry: Not on file     Inability: Not on file    Transportation needs:     Medical: Not on file     Non-medical: Not on file   Tobacco Use    Smoking status: Former Smoker     Packs/day: 0.50     Years: 30.00     Pack years: 15.00     Last attempt to quit: 2012     Years since quittin.5    Smokeless tobacco: Never Used   Substance and Sexual Activity    Alcohol use: Yes     Alcohol/week: 7.0 - 10.0 standard drinks     Types: 7 - 10 Cans of beer per week     Comment: 3-4 beers daily, may have 6 beers on weekends    Drug use: No    Sexual activity: Never   Lifestyle    Physical activity:     Days per week: Not on file     Minutes per session: Not on file    Stress: Not on file   Relationships    Social connections:     Talks on phone: Not on file     Gets together: Not on file     Attends Anabaptist service: Not on file     Active member of club or organization: Not on file     Attends meetings of clubs or organizations: Not on file     Relationship status: Not on file    Intimate partner violence:     Fear of current or ex partner: Not on file     Emotionally abused: Not on file     Physically abused: Not on file     Forced sexual activity: Not on file   Other Topics Concern    Not on file   Social History Narrative    Retired from [employer]. .   3 children, 2 sons, 1 daughter       Family History   Problem Relation Age of Onset   Karie Peabody Arthritis-rheumatoid Mother     Hypertension Mother    Karie Peabody Elevated Lipids Mother    Javad Box Mother         bladder    Stroke Mother     Elevated Lipids Sister     Hypertension Sister     Diabetes Sister     Elevated Lipids Sister     Hypertension Sister     Other Son         SARCOIDOSIS, KERATOCONUS    Other Daughter         KERATOCONUS    Anesth Problems Neg Hx        Current Outpatient Medications   Medication Sig    latanoprost (XALATAN) 0.005 % ophthalmic solution Apply 1 Drop to eye nightly.  candesartan (ATACAND) 16 mg tablet TAKE 1 TABLET BY MOUTH DAILY    atenolol (TENORMIN) 50 mg tablet TAKE 1 TABLET BY MOUTH DAILY    amLODIPine (NORVASC) 5 mg tablet TAKE 1 TABLET BY MOUTH DAILY    multivitamin (ONE A DAY) tablet Take 1 Tab by mouth daily.  calcium carbonate-vitamin D3 1,000 mg(2,500 mg)-800 unit Tab Take  by mouth daily.  albuterol (PROVENTIL HFA, VENTOLIN HFA, PROAIR HFA) 90 mcg/actuation inhaler Take 2 Puffs by inhalation every four to six (4-6) hours as needed.  fexofenadine (ALLEGRA) 180 mg tablet Take 1 Tab by mouth daily as needed for Itching. (Patient not taking: Reported on 11/27/2019)     No current facility-administered medications for this visit. Review of Systems   Constitutional: Negative for chills, diaphoresis, fever, malaise/fatigue and weight loss. HENT: Positive for sinus pain. Respiratory: Negative for cough and shortness of breath. Cardiovascular: Negative for chest pain, palpitations and leg swelling. Gastrointestinal: Negative for abdominal pain, blood in stool, constipation, diarrhea, nausea and vomiting. Genitourinary: Negative for dysuria, flank pain, frequency, hematuria and urgency. Musculoskeletal: Negative for back pain, joint pain and myalgias. Skin: Negative. Neurological: Negative for dizziness, tingling, sensory change, speech change, focal weakness and headaches. Endo/Heme/Allergies: Positive for environmental allergies. Psychiatric/Behavioral: Negative for depression.  The patient is not nervous/anxious and does not have insomnia. Vitals:    11/27/19 1031   BP: 134/75   Pulse: 64   Resp: 18   Temp: 97.1 °F (36.2 °C)   TempSrc: Oral   SpO2: 100%   Weight: 145 lb 12.8 oz (66.1 kg)   Height: 5' 3\" (1.6 m)     Physical Exam  Vitals signs reviewed. Constitutional:       Appearance: Normal appearance. She is well-developed. Neck:      Thyroid: Thyromegaly present. Cardiovascular:      Rate and Rhythm: Normal rate and regular rhythm. Pulses:           Dorsalis pedis pulses are 2+ on the right side and 2+ on the left side. Heart sounds: Normal heart sounds, S1 normal and S2 normal. No murmur. Comments: No edema noted  Pulmonary:      Effort: Pulmonary effort is normal.      Breath sounds: Normal breath sounds. No decreased breath sounds, wheezing, rhonchi or rales. Abdominal:      General: Bowel sounds are normal.      Palpations: Abdomen is soft. Tenderness: There is no tenderness. Skin:     General: Skin is warm and dry. Neurological:      Mental Status: She is alert and oriented to person, place, and time. Psychiatric:         Speech: Speech normal.         Behavior: Behavior normal. Behavior is cooperative. Thought Content: Thought content normal.         ASSESSMENT and PLAN    ICD-10-CM ICD-9-CM    1. Essential hypertension I58 004.6 METABOLIC PANEL, COMPREHENSIVE   2. Allergic rhinitis, unspecified seasonality, unspecified trigger J30.9 477.9 azelastine (ASTELIN) 137 mcg (0.1 %) nasal spray   3. Foreign body sensation in throat R09.89 784.99    4. Thyroid fullness E07.89 246.8 TSH AND FREE T4      US THYROID/PARATHYROID/SOFT TISS   5. Sarcoidosis,lung D86.9 135    6. Encounter for immunization Z23 V03.89 INFLUENZA VACCINE INACTIVATED (IIV), SUBUNIT, ADJUVANTED, IM      PNEUMOCOCCAL POLYSACCHARIDE VACCINE, 23-VALENT, ADULT OR IMMUNOSUPPRESSED PT DOSE,     Encounter Diagnoses   Name Primary?     Essential hypertension Yes    Allergic rhinitis, unspecified seasonality, unspecified trigger     Foreign body sensation in throat     Thyroid fullness     Sarcoidosis,lung     Encounter for immunization      Orders Placed This Encounter    US THYROID/PARATHYROID/SOFT TISS    Influenza Vaccine Inactivated (IIV) (FLUAD), Subunit, Adjuvanted, IM (25697)    Pneumococcal polysaccharide vaccine, 23-valent, adult or immunosuppressed pt dose    METABOLIC PANEL, COMPREHENSIVE    TSH AND FREE T4    azelastine (ASTELIN) 137 mcg (0.1 %) nasal spray     Diagnoses and all orders for this visit:    1. Essential hypertension - stable. Continue current meds  -     METABOLIC PANEL, COMPREHENSIVE    2. Allergic rhinitis, unspecified seasonality, unspecified trigger  -     azelastine (ASTELIN) 137 mcg (0.1 %) nasal spray; 1 Ramsey by Both Nostrils route two (2) times a day. Use in each nostril as directed    3. Foreign body sensation in throat - will try astelin nasal spray for couple weeks, check thyroid labs and US. If labs and US negative and nasal spray do not help, will refer to ENT    4. Thyroid fullness  -     TSH AND FREE T4  -     US THYROID/PARATHYROID/SOFT TISS; Future    5. Sarcoidosis,lung - managed by pulmonary    6. Encounter for immunization  -     INFLUENZA VACCINE INACTIVATED (IIV), SUBUNIT, ADJUVANTED, IM  -     PNEUMOCOCCAL POLYSACCHARIDE VACCINE, 23-VALENT, ADULT OR IMMUNOSUPPRESSED PT DOSE,      Follow-up and Dispositions    · Return in about 6 months (around 5/27/2020), or if symptoms worsen or fail to improve.       lab results and schedule of future lab studies reviewed with patient  reviewed diet, exercise and weight control    I have reviewed the patient's allergies and made any necessary changes. Medical, procedural, social and family histories have been reviewed and updated as medically indicated. I have reconciled and/or revised patient medications in the EMR.        I have discussed each diagnosis listed in this note with Mercy Geller and/or their family. I have discussed treatment options and the risk/benefit analysis of those options, including safe use of medications and possible medication side effects. Through the use of shared decision making we have agreed to the above plan. The patient has received an after-visit summary and questions were answered concerning future plans. Denisse Hammer, TOD-C    This note will not be viewable in [employer].

## 2019-11-27 NOTE — PATIENT INSTRUCTIONS
Vaccine Information Statement Influenza (Flu) Vaccine (Inactivated or Recombinant): What You Need to Know Many Vaccine Information Statements are available in Vietnamese and other languages. See www.immunize.org/vis Hojas de información sobre vacunas están disponibles en español y en muchos otros idiomas. Visite www.immunize.org/vis 1. Why get vaccinated? Influenza vaccine can prevent influenza (flu). Flu is a contagious disease that spreads around the [facility] every year, usually between October and May. Anyone can get the flu, but it is more dangerous for some people. Infants and young children, people 72years of age and older, pregnant women, and people with certain health conditions or a weakened immune system are at greatest risk of flu complications. Pneumonia, bronchitis, sinus infections and ear infections are examples of flu-related complications. If you have a medical condition, such as heart disease, cancer or diabetes, flu can make it worse. Flu can cause fever and chills, sore throat, muscle aches, fatigue, cough, headache, and runny or stuffy nose. Some people may have vomiting and diarrhea, though this is more common in children than adults. Each year thousands of people in the [facility] die from flu, and many more are hospitalized. Flu vaccine prevents millions of illnesses and flu-related visits to the doctor each year. 2. Influenza vaccines CDC recommends everyone 10months of age and older get vaccinated every flu season. Children 6 months through 6years of age may need 2 doses during a single flu season. Everyone else needs only 1 dose each flu season. It takes about 2 weeks for protection to develop after vaccination. There are many flu viruses, and they are always changing. Each year a new flu vaccine is made to protect against three or four viruses that are likely to cause disease in the upcoming flu season.  Even when the vaccine doesnt exactly match these viruses, it may still provide some protection. Influenza vaccine does not cause flu. Influenza vaccine may be given at the same time as other vaccines. 3. Talk with your health care provider Tell your vaccine provider if the person getting the vaccine: 
 Has had an allergic reaction after a previous dose of influenza vaccine, or has any severe, life-threatening allergies.  Has ever had Guillain-Barré Syndrome (also called GBS). In some cases, your health care provider may decide to postpone influenza vaccination to a future visit. People with minor illnesses, such as a cold, may be vaccinated. People who are moderately or severely ill should usually wait until they recover before getting influenza vaccine. Your health care provider can give you more information. 4. Risks of a reaction  Soreness, redness, and swelling where shot is given, fever, muscle aches, and headache can happen after influenza vaccine.  There may be a very small increased risk of Guillain-Barré Syndrome (GBS) after inactivated influenza vaccine (the flu shot). Zaire May children who get the flu shot along with pneumococcal vaccine (PCV13), and/or DTaP vaccine at the same time might be slightly more likely to have a seizure caused by fever. Tell your health care provider if a child who is getting flu vaccine has ever had a seizure. People sometimes faint after medical procedures, including vaccination. Tell your provider if you feel dizzy or have vision changes or ringing in the ears. As with any medicine, there is a very remote chance of a vaccine causing a severe allergic reaction, other serious injury, or death. 5. What if there is a serious problem? An allergic reaction could occur after the vaccinated person leaves the clinic.  If you see signs of a severe allergic reaction (hives, swelling of the face and throat, difficulty breathing, a fast heartbeat, dizziness, or weakness), call 9-1-1 and get the person to the nearest hospital. 
 
For other signs that concern you, call your health care provider. Adverse reactions should be reported to the Vaccine Adverse Event Reporting System (VAERS). Your health care provider will usually file this report, or you can do it yourself. Visit the VAERS website at www.vaers. hhs.gov or call 0-355.739.2663. VAERS is only for reporting reactions, and VAERS staff do not give medical advice. 6. The National Vaccine Injury Compensation Program 
 
The [facility] Vaccine Injury Compensation Program (VICP) is a federal program that was created to compensate people who may have been injured by certain vaccines. Visit the VICP website at www.[facility].gov/vaccinecompensation or call 3-360.458.8659 to learn about the program and about filing a claim. There is a time limit to file a claim for compensation. 7. How can I learn more?  Ask your health care provider.  Call your local or state health department.  Contact the Centers for Disease Control and Prevention (CDC): 
- Call 0-374.720.4966 (5-081-GRJ-INFO) or 
- Visit CDCs influenza website at www.cdc.gov/flu Vaccine Information Statement (Interim) Inactivated Influenza Vaccine 8/15/2019 
42 UKash Polo Alesha 585NX-03 Department of [facility] and [facility] Centers for Disease Control and Prevention Office Use Only Vaccine Information Statement Pneumococcal Polysaccharide Vaccine (PPSV23): What You Need to Know Many Vaccine Information Statements are available in Scottish and other languages. See www.immunize.org/vis Hojas de información sobre vacunas están disponibles en español y en muchos otros idiomas. Visite www.immunize.org/vis 1. Why get vaccinated? Pneumococcal polysaccharide vaccine (PPSV23) can prevent pneumococcal disease. Pneumococcal disease refers to any illness caused by pneumococcal bacteria.  These bacteria can cause many types of illnesses, including pneumonia, which is an infection of the lungs. Pneumococcal bacteria are one of the most common causes of pneumonia. Besides pneumonia, pneumococcal bacteria can also cause: 
 Ear infections  Sinus infections  Meningitis (infection of the tissue covering the brain and spinal cord)  Bacteremia (bloodstream infection) Anyone can get pneumococcal disease, but children under 3years of age, people with certain medical conditions, adults 72 years or older, and cigarette smokers are at the highest risk. Most pneumococcal infections are mild. However, some can result in long-term problems, such as brain damage or hearing loss. Meningitis, bacteremia, and pneumonia caused by pneumococcal disease can be fatal.  
 
2. PPSV23  
 
PPSV23 protects against 23 types of bacteria that cause pneumococcal disease. PPSV23 is recommended for:  All adults 72 years or older,  Anyone 2 years or older with certain medical conditions that can lead to an increased risk for pneumococcal disease. Most people need only one dose of PPSV23. A second dose of PPSV23, and another type of pneumococcal vaccine called PCV13, are recommended for certain high-risk groups. Your health care provider can give you more information. People 65 years or older should get a dose of PPSV23 even if they have already gotten one or more doses of the vaccine before they turned 72. 
 
3. Talk with your health care provider Tell your vaccine provider if the person getting the vaccine: 
 Has had an allergic reaction after a previous dose of PPSV23, or has any severe, life-threatening allergies. In some cases, your health care provider may decide to postpone PPSV23 vaccination to a future visit. People with minor illnesses, such as a cold, may be vaccinated. People who are moderately or severely ill should usually wait until they recover before getting PPSV23. Your health care provider can give you more information. 4. Risks of a vaccine reaction  Redness or pain where the shot is given, feeling tired, fever, or muscle aches can happen after PPSV23. People sometimes faint after medical procedures, including vaccination. Tell your provider if you feel dizzy or have vision changes or ringing in the ears. As with any medicine, there is a very remote chance of a vaccine causing a severe allergic reaction, other serious injury, or death. 5. What if there is a serious problem? An allergic reaction could occur after the vaccinated person leaves the clinic. If you see signs of a severe allergic reaction (hives, swelling of the face and throat, difficulty breathing, a fast heartbeat, dizziness, or weakness), call 9-1-1 and get the person to the nearest hospital. 
 
For other signs that concern you, call your health care provider. Adverse reactions should be reported to the Vaccine Adverse Event Reporting System (VAERS). Your health care provider will usually file this report, or you can do it yourself. Visit the VAERS website at www.vaers. [facility].gov or call 2-674.609.8465. VAERS is only for reporting reactions, and VAERS staff do not give medical advice. 6. How can I learn more?  Ask your health care provider.  Call your local or state health department.  Contact the Centers for Disease Control and Prevention (CDC): 
- Call 2-453.433.4992 (1-800-CDC-INFO) or 
- Visit CDCs website at www.cdc.gov/vaccines Vaccine Information Statement PPSV23  
10/30/2019 Department of [facility] and [employer] Centers for Disease Control and Prevention Office Use Only

## 2019-11-28 LAB
ALBUMIN SERPL-MCNC: 4.3 G/DL (ref 3.6–4.8)
ALBUMIN/GLOB SERPL: 1.4 {RATIO} (ref 1.2–2.2)
ALP SERPL-CCNC: 132 IU/L (ref 39–117)
ALT SERPL-CCNC: 16 IU/L (ref 0–32)
AST SERPL-CCNC: 33 IU/L (ref 0–40)
BILIRUB SERPL-MCNC: 0.3 MG/DL (ref 0–1.2)
BUN SERPL-MCNC: 6 MG/DL (ref 8–27)
BUN/CREAT SERPL: 9 (ref 12–28)
CALCIUM SERPL-MCNC: 10.2 MG/DL (ref 8.7–10.3)
CHLORIDE SERPL-SCNC: 99 MMOL/L (ref 96–106)
CO2 SERPL-SCNC: 24 MMOL/L (ref 20–29)
CREAT SERPL-MCNC: 0.64 MG/DL (ref 0.57–1)
GLOBULIN SER CALC-MCNC: 3.1 G/DL (ref 1.5–4.5)
GLUCOSE SERPL-MCNC: 100 MG/DL (ref 65–99)
POTASSIUM SERPL-SCNC: 5 MMOL/L (ref 3.5–5.2)
PROT SERPL-MCNC: 7.4 G/DL (ref 6–8.5)
SODIUM SERPL-SCNC: 138 MMOL/L (ref 134–144)
T4 FREE SERPL-MCNC: 2.68 NG/DL (ref 0.82–1.77)
TSH SERPL DL<=0.005 MIU/L-ACNC: <0.006 UIU/ML (ref 0.45–4.5)

## 2019-12-03 ENCOUNTER — HOSPITAL ENCOUNTER (OUTPATIENT)
Dept: ULTRASOUND IMAGING | Age: 69
Discharge: HOME | End: 2019-12-03
Payer: MEDICARE

## 2019-12-03 DIAGNOSIS — E07.89 THYROID FULLNESS: ICD-10-CM

## 2019-12-03 PROCEDURE — 76536 US EXAM OF HEAD AND NECK: CPT

## 2019-12-04 DIAGNOSIS — E05.90 HYPERTHYROIDISM: Primary | ICD-10-CM

## 2019-12-04 RX ORDER — METHIMAZOLE 10 MG/1
10 TABLET ORAL DAILY
Qty: 30 TAB | Refills: 1 | Status: SHIPPED | OUTPATIENT
Start: 2019-12-04

## 2019-12-04 NOTE — PROGRESS NOTES
RECOMMENDATIONS:  Thyroid ultrasound normal.  If you still have sensation of something in your throat, we can refer you to an ENT.

## 2019-12-04 NOTE — PROGRESS NOTES
RECOMMENDATIONS:  Thyroid labs are abnormal, indicating hyperthyroidism. I would like to check couple additional labs to rule out Grave's disease (thyroid antibody profile, TSH receptor antibody) and start you on a medication. I also want you to see a specialist (endocrinologist), but it may take some time to get in with them. We need to start methimazole 10 mg once daily.   Side effects of methimazole are:   - rash (in up to 5% of patients)   - nausea/vomiting (as long as you take this with food, this shouldn't be an issue)   - joint pains (usually is only seen in elderly patients)   - abnormal liver function tests (if you develop pain under the right side of your rib cage, or nausea/vomiting, or jaundice where your eyes are turning yellow, please stop the med immediately and call me)   - low white blood cell count (if you develop a fever or sore throat, usually this is from a viral infection, however, if this persists for more than 3 days, please let me know so we can draw your blood count just to make sure your white blood cell count isn't going low)

## 2019-12-09 ENCOUNTER — TELEPHONE (OUTPATIENT)
Dept: FAMILY MEDICINE CLINIC | Age: 69
End: 2019-12-09

## 2019-12-09 NOTE — TELEPHONE ENCOUNTER
----- Message from Alice Coronel sent at 12/9/2019  1:37 PM EST -----  Regarding: Nilsa Hammer/telephone  Contact: [phone]  Caller's first and last name and relationship (if not the patient): n/a  Best contact number(s): [phone]  Whose call is being returned: Pt unsure  Details to clarify the request: Pt missed a call from providers office and was returning call.

## 2019-12-10 ENCOUNTER — LAB ONLY (OUTPATIENT)
Dept: FAMILY MEDICINE CLINIC | Age: 69
End: 2019-12-10

## 2019-12-10 ENCOUNTER — HOSPITAL ENCOUNTER (OUTPATIENT)
Dept: LAB | Age: 69
Discharge: HOME | End: 2019-12-10
Payer: MEDICARE

## 2019-12-10 DIAGNOSIS — E05.90 HYPERTHYROIDISM: Primary | ICD-10-CM

## 2019-12-10 PROCEDURE — 86800 THYROGLOBULIN ANTIBODY: CPT

## 2019-12-10 PROCEDURE — 83520 IMMUNOASSAY QUANT NOS NONAB: CPT

## 2019-12-10 PROCEDURE — 36415 COLL VENOUS BLD VENIPUNCTURE: CPT

## 2019-12-10 NOTE — TELEPHONE ENCOUNTER
Verified patient with two type of identifiers. Pt scheduled for 12/10/2019 at 2 PM for lab only. Pt verbalized understanding.

## 2019-12-10 NOTE — PROGRESS NOTES
Patient comes in today for lab only    Orders Placed This Encounter    THYROID ANTIBODY PANEL    TSH RECEPTOR AB

## 2019-12-10 NOTE — TELEPHONE ENCOUNTER
RECOMMENDATIONS:  Thyroid labs are abnormal, indicating hyperthyroidism. I would like to check couple additional labs to rule out Grave's disease and start you on a medication. I also want you to see a specialist (endocrinologist), but it may take some time to get in with them.     We need to start methimazole 10 mg once daily.   Side effects of methimazole are:   - rash (in up to 5% of patients)   - nausea/vomiting (as long as you take this with food, this shouldn't be an issue)   - joint pains (usually is only seen in elderly patients)   - abnormal liver function tests (if you develop pain under the right side of your rib cage, or nausea/vomiting, or jaundice where your eyes are turning yellow, please stop the med immediately and call me)   - low white blood cell count (if you develop a fever or sore throat, usually this is from a viral infection, however, if this persists for more than 3 days, please let me know so we can draw your blood count just to make sure your white blood cell count isn't going low)

## 2019-12-11 LAB
THYROGLOB AB SERPL-ACNC: <1 IU/ML (ref 0–0.9)
THYROPEROXIDASE AB SERPL-ACNC: 15 IU/ML (ref 0–34)
TSH RECEP AB SER-ACNC: 30.1 IU/L (ref 0–1.75)

## 2019-12-12 NOTE — PROGRESS NOTES
RECOMMENDATIONS:  Lab work indicates your hyperthyroidism is caused by Grave's disease. One of the most common causes of hyperthyroidism is Graves' disease, an autoimmune process in which the patient's immune cells make antibodies against the thyroid stimulating hormone (TSH) receptor on the thyroid gland cells. Take methimazole as prescribed. Will get you into endocrinology for follow up.

## 2019-12-13 ENCOUNTER — TELEPHONE (OUTPATIENT)
Dept: FAMILY MEDICINE CLINIC | Age: 69
End: 2019-12-13

## 2019-12-13 NOTE — TELEPHONE ENCOUNTER
See which endocrinology office can see her the quickest:   Dr. Alia Patel:  Lab work indicates your hyperthyroidism is caused by Grave's disease. One of the most common causes of hyperthyroidism is Graves disease, an autoimmune process in which the patients immune cells make antibodies against the thyroid stimulating hormone (TSH) receptor on the thyroid gland cells. Take methimazole as prescribed.   Will get you into endocrinology for follow up.

## 2019-12-23 NOTE — TELEPHONE ENCOUNTER
Verified patient with two type of identifiers. Informed pt of lab results and appt. Pt verbalized understanding.          Pt scheduled at Dr. Naima Lara office with [facility].      Appt: 1/21/20 at 1:30 PM with a 1 PM arrival time.      Phone Fax E-mail Address   [phone] Not available [address] [place] [address]

## 2020-01-07 ENCOUNTER — TELEPHONE (OUTPATIENT)
Dept: FAMILY MEDICINE CLINIC | Age: 70
End: 2020-01-07

## 2020-01-07 NOTE — TELEPHONE ENCOUNTER
Patient wants a return call regarding having pain under her ribs, she said the medication methIMAzole (TAPAZOLE) 10 mg tablet is causing the pain.   Please give her a call @ [phone]

## 2020-01-07 NOTE — TELEPHONE ENCOUNTER
Verified patient with two type of identifiers. Pt states around cuco had cold like symptoms with cough and pain in right side of her rib started. Pt states cough subsided but came back Sunday and right side still hurts when coughing. Pt thinks methimazole is the cause due to side effects listed. Pt did take cociden for 3 days, but did not try mucinex because it made her \"feel funny. \"

## 2020-01-08 NOTE — TELEPHONE ENCOUNTER
Verified patient with two type of identifiers. Offered pt appt for 1/9/20 at 9 AM and pt declined stating \"pain has subsided and I think I am okay right now. \" Pt informed to call if any concerns or cough worsens. Pt verbalized understanding.

## 2020-02-26 DIAGNOSIS — I10 ESSENTIAL HYPERTENSION: ICD-10-CM

## 2020-02-26 RX ORDER — CANDESARTAN 16 MG/1
TABLET ORAL
Qty: 90 TAB | Refills: 3 | Status: SHIPPED | OUTPATIENT
Start: 2020-02-26 | End: 2021-02-16 | Stop reason: SDUPTHER

## 2020-02-26 RX ORDER — AMLODIPINE BESYLATE 5 MG/1
TABLET ORAL
Qty: 90 TAB | Refills: 3 | Status: SHIPPED | OUTPATIENT
Start: 2020-02-26 | End: 2021-02-16

## 2020-02-26 RX ORDER — ATENOLOL 50 MG/1
TABLET ORAL
Qty: 90 TAB | Refills: 3 | Status: SHIPPED | OUTPATIENT
Start: 2020-02-26 | End: 2021-02-16

## 2020-05-27 ENCOUNTER — VIRTUAL VISIT (OUTPATIENT)
Dept: FAMILY MEDICINE CLINIC | Age: 70
End: 2020-05-27

## 2020-05-27 VITALS — HEART RATE: 77 BPM | DIASTOLIC BLOOD PRESSURE: 78 MMHG | SYSTOLIC BLOOD PRESSURE: 113 MMHG

## 2020-05-27 DIAGNOSIS — J30.9 ALLERGIC RHINITIS, UNSPECIFIED SEASONALITY, UNSPECIFIED TRIGGER: ICD-10-CM

## 2020-05-27 DIAGNOSIS — I10 ESSENTIAL HYPERTENSION: Primary | ICD-10-CM

## 2020-05-27 DIAGNOSIS — Z12.39 BREAST CANCER SCREENING: ICD-10-CM

## 2020-05-27 DIAGNOSIS — D86.9 SARCOIDOSIS: ICD-10-CM

## 2020-05-27 DIAGNOSIS — E05.90 HYPERTHYROIDISM: ICD-10-CM

## 2020-05-27 DIAGNOSIS — Z12.31 ENCOUNTER FOR SCREENING MAMMOGRAM FOR MALIGNANT NEOPLASM OF BREAST: ICD-10-CM

## 2020-05-27 DIAGNOSIS — M81.8 OTHER OSTEOPOROSIS WITHOUT CURRENT PATHOLOGICAL FRACTURE: ICD-10-CM

## 2020-05-27 DIAGNOSIS — M85.80 OSTEOPENIA, UNSPECIFIED LOCATION: ICD-10-CM

## 2020-05-27 NOTE — PROGRESS NOTES
Tod Gentile is a 81 y.o. female evaluated via audio only technology on 5/27/2020. Consent: She and/or her health care decision maker is aware that she may receive a bill for this audio only encounter, depending on her insurance coverage, and has provided verbal consent to proceed: Yes    I communicated with the patient and/or health care decision maker about the nature and details of the following:  Assessment & Plan:     Diagnoses and all orders for this visit:    1. Essential hypertension - stable. Continue current meds    2. Hyperthyroidism - being followed by endocrinology    3. Osteopenia, unspecified location - followed by rheumatology. Repeat DEXA in Aug 2020  -     DEXA BONE DENSITY STUDY AXIAL; Future    4. Sarcoidosis,lung - followed by pulmonary. appt Aug 2020    5. Breast cancer screening  -     [facility] 3D IVETH W MAMMO BI SCREENING INCL CAD; Future    6. Other osteoporosis without current pathological fracture   -     DEXA BONE DENSITY STUDY AXIAL; Future    7. Encounter for screening mammogram for malignant neoplasm of breast   -     [facility] 3D IVETH W MAMMO BI SCREENING INCL CAD; Future    8. Allergic rhinitis, unspecified seasonality, unspecified trigger      Follow-up and Dispositions    · Return in about 6 months (around 11/27/2020), or if symptoms worsen or fail to improve. 12  Subjective:   Tod Gentile is a 81 y.o. female who was seen for Hypertension    Dr. Michael Grimaldo - saw in Jan 2020, repeat thyroid labs normal.  Methimazole was continued.  had labs done on 4/28/2020. Took her off methimazole. States labs had increased. She called him last week, does not feel well and she is tired. Had additional labs on Friday and waiting on results. She feels similar to how she felt before starting thyroid medication. Hx pulmonary sarcoid. saw Dr. Devin Puentes in [phone], had CXR and PFTs.  Given clear to follow up in 1 year.  lungs were stable.       Dr. Bonnie Moeller -had eye appointment on July 8, 2019. Hx of osteopenia, saw Dr. Massiel Campuzano 6/4/19, in June 2018. Arelis Donis recommended stop taking fosamax, ordered DEXA, and if still needed therapy, would switch to Prolia. Stephan Jeong DEXA scan in July 2018, recommended to stay off fosamax and take calcium with vitamin D.  Plan to repeat DEXA in 2020. Needs new order. GYN provider. Zuri Hooperadrien told she no longer needed paps after 2015. Caleb Arriola had abnormal pap in 1980s. Colonoscopy in 2009 with Dr. Valarie Gates. Regla Larson no polyps were found, recalls being told 10 year follow up. Hx HTN, states compliance with medications.  Tries to follow low sodium diet.  Denies chest pains, palpitations, dizziness, dyspnea, tingling in extremities.  Urinating without difficulty. BP today 113/78, HR 77. Prior to Admission medications    Medication Sig Start Date End Date Taking? Authorizing Provider   candesartan (ATACAND) 16 mg tablet TAKE 1 TABLET BY MOUTH DAILY 2/26/20  Yes Denisse Hammer NP   atenoloL (TENORMIN) 50 mg tablet TAKE 1 TABLET BY MOUTH DAILY 2/26/20  Yes Denisse Hammer NP   amLODIPine (NORVASC) 5 mg tablet TAKE 1 TABLET BY MOUTH DAILY 2/26/20  Yes Amado Hammer NP   azelastine (ASTELIN) 137 mcg (0.1 %) nasal spray 1 [place] by Both Nostrils route two (2) times a day. Use in each nostril as directed 11/27/19  Yes Amado Hammer NP   latanoprost (XALATAN) 0.005 % ophthalmic solution Apply 1 Drop to eye nightly. 7/8/19  Yes Provider, Historical   multivitamin (ONE A DAY) tablet Take 1 Tab by mouth daily. Yes Provider, Historical   calcium carbonate-vitamin D3 1,000 mg(2,500 mg)-800 unit Tab Take 1 Tab by mouth daily. Yes Provider, Historical   methIMAzole (TAPAZOLE) 10 mg tablet Take 1 Tab by mouth daily. Patient not taking: Reported on 5/27/2020 12/4/19   Carlota Harper NP   fexofenadine (ALLEGRA) 180 mg tablet Take 1 Tab by mouth daily as needed for Itching.   Patient not taking: Reported on 11/27/2019 8/19/19   Dell, Keiko MYERS NP   albuterol (PROVENTIL HFA, VENTOLIN HFA, PROAIR HFA) 90 mcg/actuation inhaler Take 2 Puffs by inhalation every four to six (4-6) hours as needed. 8/14/19 5/27/20  Provider, Historical     Allergies   Allergen Reactions    Lisinopril Swelling and Cough     Lips swell    Pcn [Penicillins] Hives    Hydrochlorothiazide Unknown (comments)     NA down, fatigue , myalgia       Patient Active Problem List   Diagnosis Code    HTN (hypertension) I10    Hyponatremia E87.1    Osteoarthritis, knee M17.10    Sarcoidosis,lung D86.9    Osteopenia M85.80    Primary osteoarthritis of left hip M16.12    Degenerative joint disease (DJD) of hip M16.9     Current Outpatient Medications   Medication Sig Dispense Refill    candesartan (ATACAND) 16 mg tablet TAKE 1 TABLET BY MOUTH DAILY 90 Tab 3    atenoloL (TENORMIN) 50 mg tablet TAKE 1 TABLET BY MOUTH DAILY 90 Tab 3    amLODIPine (NORVASC) 5 mg tablet TAKE 1 TABLET BY MOUTH DAILY 90 Tab 3    azelastine (ASTELIN) 137 mcg (0.1 %) nasal spray 1 [place] by Both Nostrils route two (2) times a day. Use in each nostril as directed 1 Bottle 5    latanoprost (XALATAN) 0.005 % ophthalmic solution Apply 1 Drop to eye nightly. 4    multivitamin (ONE A DAY) tablet Take 1 Tab by mouth daily.  calcium carbonate-vitamin D3 1,000 mg(2,500 mg)-800 unit Tab Take 1 Tab by mouth daily.  methIMAzole (TAPAZOLE) 10 mg tablet Take 1 Tab by mouth daily. (Patient not taking: Reported on 5/27/2020) 30 Tab 1    fexofenadine (ALLEGRA) 180 mg tablet Take 1 Tab by mouth daily as needed for Itching.  (Patient not taking: Reported on 11/27/2019) 30 Tab 1     Allergies   Allergen Reactions    Lisinopril Swelling and Cough     Lips swell    Pcn [Penicillins] Hives    Hydrochlorothiazide Unknown (comments)     NA down, fatigue , myalgia     Past Medical History:   Diagnosis Date    Arthritis     OA    COPD     d/t sarcoidosis    Hypertension     Hyponatremia 2009    FOLLOWING TREATMENT WITH HCTZ; NOW RESOLVED 12    Osteopenia     Post-menopause     Sarcoidosis     nonsymptomatic     Past Surgical History:   Procedure Laterality Date    BONE MARROW ASPIRATE &BIOPSY      SARCOIDOSIS DIAGNOSED    HX DILATION AND CURETTAGE      HX HIP REPLACEMENT  2012    right    HX HIP REPLACEMENT Left 2016    HX ORTHOPAEDIC      excision right wrist cyst    HX OTHER SURGICAL  1979    BRONCHOSCOPY    HX TUBAL LIGATION  1988     Family History   Problem Relation Age of Onset   [address] [facility] Arthritis-rheumatoid Mother     Hypertension Mother     Elevated Lipids Mother     Cancer Mother         bladder    Stroke Mother     Elevated Lipids Sister     Hypertension Sister     Diabetes Sister     Elevated Lipids Sister     Hypertension Sister     Other Son         SARCOIDOSIS, KERATOCONUS    Other Daughter         KERATOCONUS    Anesth Problems Neg Hx      Social History     Tobacco Use    Smoking status: Former Smoker     Packs/day: 0.50     Years: 30.00     Pack years: 15.00     Last attempt to quit: 2012     Years since quittin.0    Smokeless tobacco: Never Used   Substance Use Topics    Alcohol use: Yes     Alcohol/week: 7.0 - 10.0 standard drinks     Types: 7 - 10 Cans of beer per week     Comment: 3-4 beers daily, may have 6 beers on weekends       Review of Systems   Constitutional: Positive for malaise/fatigue. Negative for chills, diaphoresis, fever and weight loss. HENT: Negative for sinus pain. Postnasal drainage   Respiratory: Negative for cough and shortness of breath. Cardiovascular: Negative for chest pain, palpitations and leg swelling. Gastrointestinal: Negative for abdominal pain, blood in stool, constipation, diarrhea, nausea and vomiting. Genitourinary: Negative for dysuria, flank pain, frequency, hematuria and urgency. Musculoskeletal: Negative for back pain, joint pain and myalgias. Skin: Negative.     Neurological: Negative for dizziness, tingling, sensory change, speech change, focal weakness and headaches. Endo/Heme/Allergies: Positive for environmental allergies. Psychiatric/Behavioral: Negative for depression. The patient is not nervous/anxious and does not have insomnia. I affirm this is a Patient-Initiated Episode with a Patient who has not had a related appointment within my department in the past 7 days or scheduled within the next 24 hours.     Total Time: minutes: 11-20 minutes    Note: not billable if this call serves to triage the patient into an appointment for the relevant concern      Jessenia Barreto NP

## 2020-05-27 NOTE — PROGRESS NOTES
Chief Complaint   Patient presents with    Hypertension     Pt states was told to stop methimazole due to labs from [facility]. 1. Have you been to the ER, urgent care clinic since your last visit? Hospitalized since your last visit? No    2. Have you seen or consulted any other health care providers outside of the [address] [place] [address] since your last visit? Include any pap smears or colon screening.  No    Health Maintenance Due   Topic Date Due    Shingrix Vaccine Age 49> (1 of 2) 10/24/2002    Medicare Yearly Exam  11/28/2019

## 2020-07-20 ENCOUNTER — HOSPITAL ENCOUNTER (OUTPATIENT)
Dept: BONE DENSITY | Age: 70
Discharge: HOME | End: 2020-07-20
Payer: MEDICARE

## 2020-07-20 DIAGNOSIS — M85.80 OSTEOPENIA, UNSPECIFIED LOCATION: ICD-10-CM

## 2020-07-20 DIAGNOSIS — M81.8 OTHER OSTEOPOROSIS WITHOUT CURRENT PATHOLOGICAL FRACTURE: ICD-10-CM

## 2020-07-20 PROCEDURE — 77080 DXA BONE DENSITY AXIAL: CPT

## 2020-07-21 ENCOUNTER — HOSPITAL ENCOUNTER (OUTPATIENT)
Dept: MAMMOGRAPHY | Age: 70
Discharge: HOME | End: 2020-07-21
Payer: MEDICARE

## 2020-07-21 DIAGNOSIS — Z12.39 BREAST CANCER SCREENING: ICD-10-CM

## 2020-07-21 DIAGNOSIS — Z12.31 ENCOUNTER FOR SCREENING MAMMOGRAM FOR MALIGNANT NEOPLASM OF BREAST: ICD-10-CM

## 2020-07-21 PROCEDURE — 77063 BREAST TOMOSYNTHESIS BI: CPT

## 2020-08-13 ENCOUNTER — OFFICE VISIT (OUTPATIENT)
Dept: RHEUMATOLOGY | Age: 70
End: 2020-08-13
Payer: MEDICARE

## 2020-08-13 VITALS
DIASTOLIC BLOOD PRESSURE: 91 MMHG | WEIGHT: 145 LBS | BODY MASS INDEX: 25.69 KG/M2 | SYSTOLIC BLOOD PRESSURE: 158 MMHG | TEMPERATURE: 97.5 F | HEART RATE: 67 BPM | RESPIRATION RATE: 18 BRPM

## 2020-08-13 DIAGNOSIS — M85.832 OSTEOPENIA OF LEFT FOREARM: Primary | ICD-10-CM

## 2020-08-13 DIAGNOSIS — R93.89 ABNORMAL FINDINGS ON DIAGNOSTIC IMAGING OF OTHER SPECIFIED BODY STRUCTURES: ICD-10-CM

## 2020-08-13 DIAGNOSIS — E55.9 VITAMIN D DEFICIENCY: ICD-10-CM

## 2020-08-13 PROCEDURE — G8419 CALC BMI OUT NRM PARAM NOF/U: HCPCS | Performed by: INTERNAL MEDICINE

## 2020-08-13 PROCEDURE — G8510 SCR DEP NEG, NO PLAN REQD: HCPCS | Performed by: INTERNAL MEDICINE

## 2020-08-13 PROCEDURE — 1101F PT FALLS ASSESS-DOCD LE1/YR: CPT | Performed by: INTERNAL MEDICINE

## 2020-08-13 PROCEDURE — 3017F COLORECTAL CA SCREEN DOC REV: CPT | Performed by: INTERNAL MEDICINE

## 2020-08-13 PROCEDURE — 99214 OFFICE O/P EST MOD 30 MIN: CPT | Performed by: INTERNAL MEDICINE

## 2020-08-13 PROCEDURE — G8427 DOCREV CUR MEDS BY ELIG CLIN: HCPCS | Performed by: INTERNAL MEDICINE

## 2020-08-13 PROCEDURE — 1090F PRES/ABSN URINE INCON ASSESS: CPT | Performed by: INTERNAL MEDICINE

## 2020-08-13 PROCEDURE — G8755 DIAS BP > OR = 90: HCPCS | Performed by: INTERNAL MEDICINE

## 2020-08-13 PROCEDURE — G8399 PT W/DXA RESULTS DOCUMENT: HCPCS | Performed by: INTERNAL MEDICINE

## 2020-08-13 PROCEDURE — G9899 SCRN MAM PERF RSLTS DOC: HCPCS | Performed by: INTERNAL MEDICINE

## 2020-08-13 PROCEDURE — G8753 SYS BP > OR = 140: HCPCS | Performed by: INTERNAL MEDICINE

## 2020-08-13 PROCEDURE — G8536 NO DOC ELDER MAL SCRN: HCPCS | Performed by: INTERNAL MEDICINE

## 2020-08-13 NOTE — LETTER
8/13/20 Patient: Ciara Turcios YOB: 1950 Date of Visit: 8/13/2020 Jacque Naqvi NP 
[address] [place] [address] [phone] VIA In Basket Dear Jacque Naqvi NP, Thank you for referring Ms. Herlinda Campos to [facility] for evaluation. My notes for this consultation are attached. If you have questions, please do not hesitate to call me. I look forward to following your patient along with you.  
 
 
Sincerely, 
 
Starla Do MD

## 2020-08-13 NOTE — PROGRESS NOTES
REASON FOR VISIT    This is the a follow up visit for Ms. Hawkins for     ICD-10-CM   1. Osteopenia of left forearm  M85.832      Osteoporosis Historical Synopsis    Height loss since age [address] (at least two inches): 1  Fracture history includes: no  Family history of hip fracture: no  Fall Risk: no    Daily calcium intake is 1200 mg  Daily vitamin D intake is 800 IU    Smoking history: yes (quit 2012)  Alcohol consumption: yes (three to four - 12 ounce beers about 4 days per week)  Prednisone history: yes (history of chronic daily prednisone [id number] for sarcoidosis of lung)    Exercise: yes    Previous work-up for osteoporosis includes the following:  DEXA Scan: 7/20/2020  Vitamin 25OH D level: 52.7 (5/28/2019)  PTH: 35 (6/06/2018  TSH: 2.66 (11/27/2019)    Therapy History includes:    Current osteoporosis therapy includes:   Prior osteoporosis therapy includes: alendronate 70 mg weekly (6/2013 to 6/06/2018; 5 years)  The following osteoporosis therapy have been ineffective: non  The following osteoporosis therapy were stopped because of side effects: none    HISTORY OF PRESENT ILLNESS      Ms. Asad Avila presents for follow up. On her last visit, I ordered a repeat DXA and continued off treatment. Her DXA on 7/20/2020 showed slight osteopani in her wrist. Hips were not done due to bilateral hip replacements. Today, she feels well and is without complaints. No falls or fractures since last visit. She denies fever, weight loss, blurred vision, vision loss, oral ulcers, ankle swelling, dry cough, dyspnea, nausea, vomiting, dysphagia, abdominal pain, black or bloody stool, fall since last visit, rash, easy bruising and increased thirst.    REVIEW OF SYSTEMS    A comprehensive review of systems was performed and pertinent results are documented in the HPI, review of systems is otherwise non-contributory.     PAST MEDICAL HISTORY    She has a past medical history of Arthritis, COPD, Hypertension, Hyponatremia (2009), Osteopenia, Post-menopause, and Sarcoidosis. FAMILY HISTORY    Her family history includes Arthritis-rheumatoid in her mother; Cancer in her mother; Diabetes in her sister; Elevated Lipids in her mother, sister, and sister; Hypertension in her mother, sister, and sister; Other in her daughter and son; Stroke in her mother. SOCIAL HISTORY    She reports that she quit smoking about 8 years ago. She has a 15.00 pack-year smoking history. She has never used smokeless tobacco. She reports current alcohol use of about 7.0 - 10.0 standard drinks of alcohol per week. She reports that she does not use drugs. MEDICATIONS    Current Outpatient Medications   Medication Sig Dispense Refill    candesartan (ATACAND) 16 mg tablet TAKE 1 TABLET BY MOUTH DAILY 90 Tab 3    atenoloL (TENORMIN) 50 mg tablet TAKE 1 TABLET BY MOUTH DAILY 90 Tab 3    amLODIPine (NORVASC) 5 mg tablet TAKE 1 TABLET BY MOUTH DAILY 90 Tab 3    methIMAzole (TAPAZOLE) 10 mg tablet Take 1 Tab by mouth daily. (Patient taking differently: Take 10 mg by mouth every other day.) 30 Tab 1    azelastine (ASTELIN) 137 mcg (0.1 %) nasal spray 1 [place] by Both Nostrils route two (2) times a day. Use in each nostril as directed 1 Bottle 5    latanoprost (XALATAN) 0.005 % ophthalmic solution Apply 1 Drop to eye nightly. 4    fexofenadine (ALLEGRA) 180 mg tablet Take 1 Tab by mouth daily as needed for Itching. 30 Tab 1    multivitamin (ONE A DAY) tablet Take 1 Tab by mouth daily.  calcium carbonate-vitamin D3 1,000 mg(2,500 mg)-800 unit Tab Take 1 Tab by mouth daily.          ALLERGIES    Allergies   Allergen Reactions    Lisinopril Swelling and Cough     Lips swell    Pcn [Penicillins] Hives    Hydrochlorothiazide Unknown (comments)     NA down, fatigue , myalgia       PHYSICAL EXAMINATION    Visit Vitals  BP (!) 158/91   Pulse 67   Temp 97.5 °F (36.4 °C)   Resp 18   Wt 145 lb (65.8 kg)   BMI 25.69 kg/m²     Body mass index is 25.69 kg/m². General: Patient is alert, oriented x 3, not in acute distress    HEENT:   Conjunctiva are not injected and appear moist, there is no alopecia. Chest:  Breathing comfortably at room air    Neurological exam:  Muscle strength is full in upper and lower extremities. Skin exam:  There are no rashes, no active Raynaud's     Musculoskeletal exam:  No synovitis. DATA REVIEW    Laboratory     Recent laboratory results were reviewed, summarized, and discussed with the patient. Imaging    Musculoskeletal Ultrasound    None    Radiographs    None    CT Imaging    None    MR Imaging    None    DXA     DXA 7/20/2020: (excluded s/p bilateral hip replacements) lumbar spine L1-L4 T score 1.0 (BMD 1.319 g/cm2), and distal one third left radius T score -1.3 (BMD 0.760 g/cm2). FRAX score N/A % probability in 10 years for major osteoporotic fracture and N/A % 10 year probability of hip fracture. DXA 7/16/2018: (excluded Both hips for replacement) lumbar spine L1-L4 T score 0.1 (BMD 1.156 g/cm2), distal one third left radius T score -0.3 (BMD 0.664 g/cm2). FRAX score 5.3 % probability in 10 years for major osteoporotic fracture and 0.8 % 10 year probability of hip fracture. I personally reviewed the images of this study. DXA 5/12/2015: (excluded right femoral neck, right total hip) lumbar spine L1-L4 T score -0.1 (BMD 1.137 g/cm2), left femoral neck T score: -2.0 (0.661 g/cm2), left total hip T score: -1.6 (0.784 g/cm2), and distal one third left radius T score -0.2 (BMD 0.684 g/cm2). FRAX score N/A % probability in 10 years for major osteoporotic fracture and N/A % 10 year probability of hip fracture. DXA 2/01/2013: (excluded right femoral hip, right total hip) lumbar spine L1-L4 T score -1.0 (BMD 1.041 g/cm2), left femoral neck T score: -2.3 (0.624 g/cm2), left total hip T score: -1.7 (0.773 g/cm2), and distal one third left radius T score -0.1 (BMD 0.688 g/cm2).  FRAX score 4.3 % probability in 10 years for major osteoporotic fracture and 0.9 % 10 year probability of hip fracture. ASSESSMENT AND PLAN    1) Post-Menopausal with Abnormal Bone Density. Her most recent DXA scan 7/2020 showed a distal one third left radius T score -1.3 (BMD 0.760 g/cm2). She had bilateral hip replacements due to degenerative arthritis. No history of fracture. She had been on alendronate 70 mg weekly 6/2013 to 6/06/2018, which is a total of 5 years. Priti Mouse al reported that the risk of atypical femoral fractures is low, with an incidence of up to 50 per 100,000 person-years during the first 5 years of bisphosphonate use, resulting in a clear positive benefit/risk ratio within this time frame (J Bone [place] Res. 2016 Jan;31(1):16-35). If treatment is necessary, I will recommend Prolia     At this time, I recommend repeat DXA in 7/2022. I gave her lab slip to have drawn at her PCP. 2) Vitamin D Deficiency. Her vitamin D level was 52.7 (previously 31.2, 15). I gave her lab slip to have drawn at her PCP. The patient voiced understanding of the aforementioned assessment and plan. Summary of plan was provided in the After Visit Summary patient instructions.      TODAY'S ORDERS    Orders Placed This Encounter    VITAMIN D, 25 HYDROXY    TSH REFLEX TO T4    PTH INTACT    PROTEIN ELECTROPHORESIS W/ REFLX TRE     Future Appointments   Date Time Provider Mehdi Cohen   11/18/2020 10:30 AM Laura Salgado NP [facility] BS AMB   8/12/2021 10:20 AM Emmanuel Day MD [facility] BS AMB     Bekah Walter MD, [address] [facility]    Adult Rheumatology   Rheumatology Ultrasound Certified  [facility]  A Part of [facility], [address] [place] [address] Road   Phone [phone]  Fax [phone]

## 2020-08-27 ENCOUNTER — HOSPITAL ENCOUNTER (OUTPATIENT)
Dept: GENERAL RADIOLOGY | Age: 70
Discharge: HOME | End: 2020-08-27
Payer: MEDICARE

## 2020-08-27 DIAGNOSIS — D86.9 SARCOIDOSIS: ICD-10-CM

## 2020-08-27 PROCEDURE — 71046 X-RAY EXAM CHEST 2 VIEWS: CPT

## 2020-10-22 ENCOUNTER — HOSPITAL ENCOUNTER (OUTPATIENT)
Dept: LAB | Age: 70
Discharge: HOME | End: 2020-10-22
Payer: MEDICARE

## 2020-10-22 PROCEDURE — 84443 ASSAY THYROID STIM HORMONE: CPT

## 2020-10-22 PROCEDURE — 82306 VITAMIN D 25 HYDROXY: CPT

## 2020-10-22 PROCEDURE — 36415 COLL VENOUS BLD VENIPUNCTURE: CPT

## 2020-10-22 PROCEDURE — 83970 ASSAY OF PARATHORMONE: CPT

## 2020-10-22 PROCEDURE — 84165 PROTEIN E-PHORESIS SERUM: CPT

## 2020-10-25 LAB
25(OH)D3+25(OH)D2 SERPL-MCNC: 41.2 NG/ML (ref 30–100)
ALBUMIN SERPL ELPH-MCNC: 3.9 G/DL (ref 2.9–4.4)
ALBUMIN/GLOB SERPL: 1.4 {RATIO} (ref 0.7–1.7)
ALPHA1 GLOB SERPL ELPH-MCNC: 0.2 G/DL (ref 0–0.4)
ALPHA2 GLOB SERPL ELPH-MCNC: 0.6 G/DL (ref 0.4–1)
B-GLOBULIN SERPL ELPH-MCNC: 0.8 G/DL (ref 0.7–1.3)
GAMMA GLOB SERPL ELPH-MCNC: 1.1 G/DL (ref 0.4–1.8)
GLOBULIN SER CALC-MCNC: 2.7 G/DL (ref 2.2–3.9)
M PROTEIN SERPL ELPH-MCNC: NORMAL G/DL
PLEASE NOTE, 011150: NORMAL
PROT PATTERN SERPL ELPH-IMP: NORMAL
PROT SERPL-MCNC: 6.6 G/DL (ref 6–8.5)
PTH-INTACT SERPL-MCNC: 37 PG/ML (ref 15–65)
TSH SERPL DL<=0.005 MIU/L-ACNC: 0.01 UIU/ML (ref 0.45–4.5)

## 2020-11-18 ENCOUNTER — OFFICE VISIT (OUTPATIENT)
Dept: FAMILY MEDICINE CLINIC | Age: 70
End: 2020-11-18
Payer: MEDICARE

## 2020-11-18 VITALS
TEMPERATURE: 97.5 F | HEART RATE: 60 BPM | SYSTOLIC BLOOD PRESSURE: 131 MMHG | BODY MASS INDEX: 26.15 KG/M2 | WEIGHT: 147.6 LBS | OXYGEN SATURATION: 100 % | DIASTOLIC BLOOD PRESSURE: 81 MMHG | HEIGHT: 63 IN | RESPIRATION RATE: 12 BRPM

## 2020-11-18 DIAGNOSIS — I10 ESSENTIAL HYPERTENSION: Primary | ICD-10-CM

## 2020-11-18 DIAGNOSIS — M85.80 OSTEOPENIA, UNSPECIFIED LOCATION: ICD-10-CM

## 2020-11-18 DIAGNOSIS — Z23 NEEDS FLU SHOT: ICD-10-CM

## 2020-11-18 DIAGNOSIS — D86.9 SARCOIDOSIS: ICD-10-CM

## 2020-11-18 DIAGNOSIS — E05.90 HYPERTHYROIDISM: ICD-10-CM

## 2020-11-18 DIAGNOSIS — Z00.00 MEDICARE ANNUAL WELLNESS VISIT, SUBSEQUENT: ICD-10-CM

## 2020-11-18 PROCEDURE — G8419 CALC BMI OUT NRM PARAM NOF/U: HCPCS | Performed by: NURSE PRACTITIONER

## 2020-11-18 PROCEDURE — 3017F COLORECTAL CA SCREEN DOC REV: CPT | Performed by: NURSE PRACTITIONER

## 2020-11-18 PROCEDURE — 1090F PRES/ABSN URINE INCON ASSESS: CPT | Performed by: NURSE PRACTITIONER

## 2020-11-18 PROCEDURE — G0439 PPPS, SUBSEQ VISIT: HCPCS | Performed by: NURSE PRACTITIONER

## 2020-11-18 PROCEDURE — G8754 DIAS BP LESS 90: HCPCS | Performed by: NURSE PRACTITIONER

## 2020-11-18 PROCEDURE — G8427 DOCREV CUR MEDS BY ELIG CLIN: HCPCS | Performed by: NURSE PRACTITIONER

## 2020-11-18 PROCEDURE — G8432 DEP SCR NOT DOC, RNG: HCPCS | Performed by: NURSE PRACTITIONER

## 2020-11-18 PROCEDURE — G8536 NO DOC ELDER MAL SCRN: HCPCS | Performed by: NURSE PRACTITIONER

## 2020-11-18 PROCEDURE — 1101F PT FALLS ASSESS-DOCD LE1/YR: CPT | Performed by: NURSE PRACTITIONER

## 2020-11-18 PROCEDURE — G8752 SYS BP LESS 140: HCPCS | Performed by: NURSE PRACTITIONER

## 2020-11-18 PROCEDURE — G0463 HOSPITAL OUTPT CLINIC VISIT: HCPCS | Performed by: NURSE PRACTITIONER

## 2020-11-18 PROCEDURE — G8399 PT W/DXA RESULTS DOCUMENT: HCPCS | Performed by: NURSE PRACTITIONER

## 2020-11-18 PROCEDURE — 90686 IIV4 VACC NO PRSV 0.5 ML IM: CPT

## 2020-11-18 PROCEDURE — 99214 OFFICE O/P EST MOD 30 MIN: CPT | Performed by: NURSE PRACTITIONER

## 2020-11-18 PROCEDURE — G9899 SCRN MAM PERF RSLTS DOC: HCPCS | Performed by: NURSE PRACTITIONER

## 2020-11-18 NOTE — PROGRESS NOTES
HISTORY OF PRESENT ILLNESS  Kimi Francois is a 78 y.o. female. HPI  Patient comes in today for follow up  Dr. Do Anderson - saw 10/12/2020. Taking methimazole 10mg 5 days weekly M-F. Has follow up 1/4/2021. Hx pulmonary sarcoid. saw Dr. Shantel Kuo in August 2020, had CXR. Will have PFTs next year. Given clear to follow up in 1 year.  lungs were stable.    Hx of osteopenia, saw Dr. Marcos Bailey in AUG 2020. She has been off fosamax, had DEXA done 7/20/20. Continued on calcium with vitamin D.  Plan to repeat DEXA in 2022 and if needed, would consider Prolia. Colonoscopy in 2009 with Dr. Inés Chu. .  states no polyps were found. Did Cologuard last year - negative  Hx HTN, states compliance with medications.  Tries to follow low sodium diet.  Denies chest pains, palpitations, dizziness, dyspnea, tingling in extremities.  Urinating without difficulty.   Allergies   Allergen Reactions    Lisinopril Swelling and Cough     Lips swell    Pcn [Penicillins] Hives    Hydrochlorothiazide Unknown (comments)     NA down, fatigue , myalgia       Past Medical History:   Diagnosis Date    Arthritis     OA    COPD     d/t sarcoidosis    Hypertension     Hyponatremia 2009    FOLLOWING TREATMENT WITH HCTZ; NOW RESOLVED 9/24/12    Osteopenia     Post-menopause     Sarcoidosis     nonsymptomatic       Past Surgical History:   Procedure Laterality Date    BONE MARROW ASPIRATE &BIOPSY  1979    SARCOIDOSIS DIAGNOSED    HX DILATION AND CURETTAGE  1992    HX HIP REPLACEMENT  2012    right    HX HIP REPLACEMENT Left 08/26/2016    HX ORTHOPAEDIC      excision right wrist cyst    HX OTHER SURGICAL  1979    BRONCHOSCOPY    HX TUBAL LIGATION  1988       Social History     Socioeconomic History    Marital status:      Spouse name: Not on file    Number of children: Not on file    Years of education: Not on file    Highest education level: Not on file   Occupational History    Not on file   Social Needs    Financial resource strain: Not on file    Food insecurity     Worry: Not on file     Inability: Not on file    Transportation needs     Medical: Not on file     Non-medical: Not on file   Tobacco Use    Smoking status: Former Smoker     Packs/day: 0.50     Years: 30.00     Pack years: 15.00     Last attempt to quit: 2012     Years since quittin.5    Smokeless tobacco: Never Used   Substance and Sexual Activity    Alcohol use: Yes     Alcohol/week: 7.0 - 10.0 standard drinks     Types: 7 - 10 Cans of beer per week     Comment: 3-4 beers daily, may have 6 beers on weekends    Drug use: No    Sexual activity: Never   Lifestyle    Physical activity     Days per week: Not on file     Minutes per session: Not on file    Stress: Not on file   Relationships    Social connections     Talks on phone: Not on file     Gets together: Not on file     Attends [employer] service: Not on file     Active member of club or organization: Not on file     Attends meetings of clubs or organizations: Not on file     Relationship status: Not on file    Intimate partner violence     Fear of current or ex partner: Not on file     Emotionally abused: Not on file     Physically abused: Not on file     Forced sexual activity: Not on file   Other Topics Concern    Not on file   Social History Narrative    Retired from [employer] .   3 children, 2 sons, 1 daughter       Family History   Problem Relation Age of Onset   [facility] Arthritis-rheumatoid Mother     Hypertension Mother     Elevated Lipids Mother     Cancer Mother         bladder    Stroke Mother     Elevated Lipids Sister     Hypertension Sister     Diabetes Sister     Elevated Lipids Sister     Hypertension Sister     Other Son         SARCOIDOSIS, KERATOCONUS    Other Daughter         KERATOCONUS    Anesth Problems Neg Hx        Current Outpatient Medications   Medication Sig    candesartan (ATACAND) 16 mg tablet TAKE 1 TABLET BY MOUTH DAILY    atenoloL (TENORMIN) 50 mg tablet TAKE 1 TABLET BY MOUTH DAILY    amLODIPine (NORVASC) 5 mg tablet TAKE 1 TABLET BY MOUTH DAILY    methIMAzole (TAPAZOLE) 10 mg tablet Take 1 Tab by mouth daily. (Patient taking differently: Take 10 mg by mouth. 5 days a week.)    azelastine (ASTELIN) 137 mcg (0.1 %) nasal spray 1 [place] by Both Nostrils route two (2) times a day. Use in each nostril as directed    latanoprost (XALATAN) 0.005 % ophthalmic solution Apply 1 Drop to eye nightly.  fexofenadine (ALLEGRA) 180 mg tablet Take 1 Tab by mouth daily as needed for Itching.  multivitamin (ONE A DAY) tablet Take 1 Tab by mouth daily.  calcium carbonate-vitamin D3 1,000 mg(2,500 mg)-800 unit Tab Take 1 Tab by mouth daily. No current facility-administered medications for this visit. Review of Systems   Constitutional: Negative for chills, diaphoresis, fever, malaise/fatigue and weight loss. HENT: Negative for sinus pain. Postnasal drainage   Respiratory: Negative for cough and shortness of breath. Cardiovascular: Negative for chest pain, palpitations and leg swelling. Gastrointestinal: Negative for abdominal pain, blood in stool, constipation, diarrhea, nausea and vomiting. Genitourinary: Negative for dysuria, flank pain, frequency, hematuria and urgency. Musculoskeletal: Negative for back pain, joint pain and myalgias. Skin: Positive for itching and rash. Neurological: Negative for dizziness, tingling, sensory change, speech change, focal weakness and headaches. Endo/Heme/Allergies: Positive for environmental allergies. Psychiatric/Behavioral: Negative for depression. The patient is not nervous/anxious and does not have insomnia. Vitals:    11/18/20 1046   BP: 131/81   Pulse: 60   Resp: 12   Temp: 97.5 °F (36.4 °C)   TempSrc: Skin   SpO2: 100%   Weight: 147 lb 9.6 oz (67 kg)   Height: 5' 3\" (1.6 m)     Physical Exam  Vitals signs reviewed.    Constitutional:       Appearance: Normal appearance. She is well-developed, well-groomed and normal weight. Neck:      Thyroid: Thyromegaly present. Cardiovascular:      Rate and Rhythm: Normal rate and regular rhythm. Pulses:           Dorsalis pedis pulses are 2+ on the right side and 2+ on the left side. Heart sounds: Normal heart sounds, S1 normal and S2 normal. No murmur. Comments: No edema noted  Pulmonary:      Effort: Pulmonary effort is normal.      Breath sounds: Normal breath sounds. No decreased breath sounds, wheezing, rhonchi or rales. Abdominal:      General: Bowel sounds are normal.      Palpations: Abdomen is soft. Tenderness: There is no abdominal tenderness. Musculoskeletal: Normal range of motion. Skin:     General: Skin is warm and dry. Findings: Rash (around neck, hyperpigmented) present. Neurological:      Mental Status: She is alert and oriented to person, place, and time. Psychiatric:         Attention and Perception: Attention normal.         Mood and Affect: Mood normal.         Speech: Speech normal.         Behavior: Behavior normal. Behavior is cooperative. Thought Content: Thought content normal.       ASSESSMENT and PLAN    ICD-10-CM ICD-9-CM    1. Essential hypertension  I10 401.9 CBC WITH AUTOMATED DIFF      LIPID PANEL      METABOLIC PANEL, COMPREHENSIVE   2. Hyperthyroidism  E05.90 242.90    3. Sarcoidosis,lung  D86.9 135    4. Osteopenia, unspecified location  M85.80 733.90    5. Medicare annual wellness visit, subsequent  Z00.00 V70.0    6. Needs flu shot  Z23 V04.81 INFLUENZA VIRUS VAC QUAD,SPLIT,PRESV FREE SYRINGE IM     Encounter Diagnoses   Name Primary?     Essential hypertension Yes    Hyperthyroidism     Sarcoidosis,lung     Osteopenia, unspecified location     Medicare annual wellness visit, subsequent     Needs flu shot      Orders Placed This Encounter    Influenza Virus Vaccine QUAD, PF Syr 6 Months + (Flulaval, Fluzone, Fluarix [id number])    CBC WITH AUTOMATED DIFF    LIPID PANEL    METABOLIC PANEL, COMPREHENSIVE     Diagnoses and all orders for this visit:    1. Essential hypertension - stable. Continue current meds  -     CBC WITH AUTOMATED DIFF; Future  -     LIPID PANEL; Future  -     METABOLIC PANEL, COMPREHENSIVE; Future    2. Hyperthyroidism - managed by endocrinology    3. Sarcoidosis,lung - managed by pulmonary. STABLE    4. Osteopenia, unspecified location - recent DEXA in [place]. Managed by rheumatology. Continued on Calcium and Vit D    5. Medicare annual wellness visit, subsequent    6. Needs flu shot  -     INFLUENZA VIRUS VAC QUAD,SPLIT,PRESV FREE SYRINGE IM      Follow-up and Dispositions    · Return in about 6 months (around 5/18/2021), or if symptoms worsen or fail to improve.       lab results and schedule of future lab studies reviewed with patient  reviewed diet, exercise and weight control    I have reviewed the patient's allergies and made any necessary changes. Medical, procedural, social and family histories have been reviewed and updated as medically indicated. I have reconciled and/or revised patient medications in the EMR. I have discussed each diagnosis listed in this note with Lisa Tavarez and/or their family. I have discussed treatment options and the risk/benefit analysis of those options, including safe use of medications and possible medication side effects. Through the use of shared decision making we have agreed to the above plan. The patient has received an after-visit summary and questions were answered concerning future plans. Denisse Hammer, EDUARDO      This is the Subsequent Medicare Annual Wellness Exam, performed 12 months or more after the Initial AWV or the last Subsequent AWV    I have reviewed the patient's medical history in detail and updated the computerized patient record.      Depression Risk Factor Screening:     3 most recent PHQ Screens 8/13/2020   Little interest or pleasure in doing things Not at all   Feeling down, depressed, irritable, or hopeless Not at all   Total Score PHQ 2 0       Alcohol Risk Screen   Do you average more than 1 drink per night or more than 7 drinks a week:  No    On any one occasion in the past three months have you have had more than 3 drinks containing alcohol:  No        Functional Ability and Level of Safety:   Hearing: Hearing is good. Activities of Daily Living: The home contains: no safety equipment. Patient does total self care     Ambulation: with no difficulty     Fall Risk:  Fall Risk Assessment, last 12 mths 8/13/2020   Able to walk? Yes   Fall in past 12 months? No     Abuse Screen:  Patient is not abused       Cognitive Screening   Has your family/caregiver stated any concerns about your memory: no     Cognitive Screening: Normal - Mini Cog Test    Assessment/Plan   Education and counseling provided:  Are appropriate based on today's review and evaluation  Pneumococcal Vaccine  Influenza Vaccine  Screening Mammography  Prostate cancer screening tests (PSA, covered annually)  Colorectal cancer screening tests  Cardiovascular screening blood test  Bone mass measurement (DEXA)  Screening for glaucoma    Diagnoses and all orders for this visit:    1. Essential hypertension  -     CBC WITH AUTOMATED DIFF; Future  -     LIPID PANEL; Future  -     METABOLIC PANEL, COMPREHENSIVE; Future    2. Hyperthyroidism    3. Sarcoidosis,lung    4. Osteopenia, unspecified location    5.  Medicare annual wellness visit, subsequent        Health Maintenance Due     Health Maintenance Due   Topic Date Due    Shingrix Vaccine Age 49> (1 of 2) 10/24/2002    Medicare Yearly Exam  11/28/2019    Flu Vaccine (1) 09/01/2020       Patient Care Team   Patient Care Team:  Marilee Miles NP as PCP - General (Nurse Practitioner)  Marilee Miles NP as PCP - [facility] Empaneled Provider    History     Patient Active Problem List   Diagnosis Code    HTN (hypertension) I10  Hyponatremia E87.1    Osteoarthritis, knee M17.10    Sarcoidosis,lung D86.9    Osteopenia M85.80    Primary osteoarthritis of left hip M16.12    Degenerative joint disease (DJD) of hip M16.9    Abnormal findings on diagnostic imaging of other specified body structures  R93.89     Past Medical History:   Diagnosis Date    Arthritis     OA    COPD     d/t sarcoidosis    Hypertension     Hyponatremia 2009    FOLLOWING TREATMENT WITH HCTZ; NOW RESOLVED 9/24/12    Osteopenia     Post-menopause     Sarcoidosis     nonsymptomatic      Past Surgical History:   Procedure Laterality Date    BONE MARROW ASPIRATE &BIOPSY  1979    SARCOIDOSIS DIAGNOSED    HX DILATION AND CURETTAGE  1992    HX HIP REPLACEMENT  2012    right    HX HIP REPLACEMENT Left 08/26/2016    HX ORTHOPAEDIC      excision right wrist cyst    HX OTHER SURGICAL  1979    BRONCHOSCOPY    HX TUBAL LIGATION  1988     Current Outpatient Medications   Medication Sig Dispense Refill    candesartan (ATACAND) 16 mg tablet TAKE 1 TABLET BY MOUTH DAILY 90 Tab 3    atenoloL (TENORMIN) 50 mg tablet TAKE 1 TABLET BY MOUTH DAILY 90 Tab 3    amLODIPine (NORVASC) 5 mg tablet TAKE 1 TABLET BY MOUTH DAILY 90 Tab 3    methIMAzole (TAPAZOLE) 10 mg tablet Take 1 Tab by mouth daily. (Patient taking differently: Take 10 mg by mouth. 5 days a week.) 30 Tab 1    azelastine (ASTELIN) 137 mcg (0.1 %) nasal spray 1 [place] by Both Nostrils route two (2) times a day. Use in each nostril as directed 1 Bottle 5    latanoprost (XALATAN) 0.005 % ophthalmic solution Apply 1 Drop to eye nightly. 4    fexofenadine (ALLEGRA) 180 mg tablet Take 1 Tab by mouth daily as needed for Itching. 30 Tab 1    multivitamin (ONE A DAY) tablet Take 1 Tab by mouth daily.  calcium carbonate-vitamin D3 1,000 mg(2,500 mg)-800 unit Tab Take 1 Tab by mouth daily.        Allergies   Allergen Reactions    Lisinopril Swelling and Cough     Lips swell    Pcn [Penicillins] Hives    Hydrochlorothiazide Unknown (comments)     NA down, fatigue , myalgia       Family History   Problem Relation Age of Onset   [facility] Arthritis-rheumatoid Mother     Hypertension Mother     Elevated Lipids Mother     Cancer Mother         bladder    Stroke Mother     Elevated Lipids Sister     Hypertension Sister     Diabetes Sister     Elevated Lipids Sister     Hypertension Sister     Other Son         SARCOIDOSIS, KERATOCONUS    Other Daughter         KERATOCONUS    Anesth Problems Neg Hx      Social History     Tobacco Use    Smoking status: Former Smoker     Packs/day: 0.50     Years: 30.00     Pack years: 15.00     Last attempt to quit: 2012     Years since quittin.5    Smokeless tobacco: Never Used   Substance Use Topics    Alcohol use:  Yes     Alcohol/week: 7.0 - 10.0 standard drinks     Types: 7 - 10 Cans of beer per week     Comment: 3-4 beers daily, may have 6 beers on weekends

## 2020-11-18 NOTE — PROGRESS NOTES
Chief Complaint   Patient presents with    Hypertension    Thyroid Problem     Pt concerned with dry skin spots due to thyroid. 1. Have you been to the ER, urgent care clinic since your last visit? Hospitalized since your last visit? No    2. Have you seen or consulted any other health care providers outside of the [address] [place] [address] since your last visit? Include any pap smears or colon screening. Yes, Endo      Flu shot - Pt accepts  NO NEW IMMUNIZATIONS SHOWN ON VIIS. Verbal Order with Readback given by Whitney Diaz NP for Influenza. Given in Left Deltoid without difficulty.       Health Maintenance Due   Topic Date Due    Shingrix Vaccine Age 49> (1 of 2) 10/24/2002    Medicare Yearly Exam  11/28/2019    Flu Vaccine (1) 09/01/2020

## 2020-11-18 NOTE — PATIENT INSTRUCTIONS
Eating Healthy Foods: Care Instructions Your Care Instructions Eating healthy foods can help lower your risk for disease. Healthy food gives you energy and keeps your heart strong, your brain active, your muscles working, and your bones strong. A healthy diet includes a variety of foods from the basic food groups: grains, vegetables, fruits, milk and milk products, and meat and beans. Some people may eat more of their favorite foods from only one food group and, as a result, miss getting the nutrients they need. So, it is important to pay attention not only to what you eat but also to what you are missing from your diet. You can eat a healthy, balanced diet by making a few small changes. Follow-up care is a key part of your treatment and safety. Be sure to make and go to all appointments, and call your doctor if you are having problems. It's also a good idea to know your test results and keep a list of the medicines you take. How can you care for yourself at home? Look at what you eat · Keep a food diary for a week or two and record everything you eat or drink. Track the number of servings you eat from each food group. · For a balanced diet every day, eat a variety of: 
? 6 or more ounce-equivalents of grains, such as cereals, breads, crackers, rice, or pasta, every day. An ounce-equivalent is 1 slice of bread, 1 cup of ready-to-eat cereal, or ½ cup of cooked rice, cooked pasta, or cooked cereal. 
? 2½ cups of vegetables, especially: § Dark-green vegetables such as broccoli and spinach. § Orange vegetables such as carrots and sweet potatoes. § Dry beans (such as agarwal and kidney beans) and peas (such as lentils). ? 2 cups of fresh, frozen, or canned fruit. A small apple or 1 banana or orange equals 1 cup. ? 3 cups of nonfat or low-fat milk, yogurt, or other milk products.  
? 5½ ounces of meat and beans, such as chicken, fish, lean meat, beans, nuts, and seeds. One egg, 1 tablespoon of peanut butter, ½ ounce nuts or seeds, or ¼ cup of cooked beans equals 1 ounce of meat. · Learn how to read food labels for serving sizes and ingredients. Fast-food and convenience-food meals often contain few or no fruits or vegetables. Make sure you eat some fruits and vegetables to make the meal more nutritious. · Look at your food diary. For each food group, add up what you have eaten and then divide the total by the number of days. This will give you an idea of how much you are eating from each food group. See if you can find some ways to change your diet to make it more healthy. Start small · Do not try to make dramatic changes to your diet all at once. You might feel that you are missing out on your favorite foods and then be more likely to fail. · Start slowly, and gradually change your habits. Try some of the following: ? Use whole wheat bread instead of white bread. ? Use nonfat or low-fat milk instead of whole milk. ? Eat brown rice instead of white rice, and eat whole wheat pasta instead of white-flour pasta. ? Try low-fat cheeses and low-fat yogurt. ? Add more fruits and vegetables to meals and have them for snacks. ? Add lettuce, tomato, cucumber, and onion to sandwiches. ? Add fruit to yogurt and cereal. 
Enjoy food · You can still eat your favorite foods. You just may need to eat less of them. If your favorite foods are high in fat, salt, and sugar, limit how often you eat them, but do not cut them out entirely. · Eat a wide variety of foods. Make healthy choices when eating out · The type of restaurant you choose can help you make healthy choices. Even fast-food chains are now offering more low-fat or healthier choices on the menu. · Choose smaller portions, or take half of your meal home. · When eating out, try: ? A veggie pizza with a whole wheat crust or grilled chicken (instead of sausage or pepperoni). ? Pasta with roasted vegetables, grilled chicken, or marinara sauce instead of cream sauce. ? A vegetable wrap or grilled chicken wrap. ? Broiled or poached food instead of fried or breaded items. Make healthy choices easy · Buy packaged, prewashed, ready-to-eat fresh vegetables and fruits, such as baby carrots, salad mixes, and chopped or shredded broccoli and cauliflower. · Buy packaged, presliced fruits, such as melon or pineapple. · Choose 100% fruit or vegetable juice instead of soda. Limit juice intake to 4 to 6 oz (½ to ¾ cup) a day. · Blend low-fat yogurt, fruit juice, and canned or frozen fruit to make a smoothie for breakfast or a snack. Where can you learn more? Go to http://www.bolden.com/ Enter T756 in the search box to learn more about \"Eating Healthy Foods: Care Instructions. \" Current as of: August 22, 2019               Content Version: 12.6 © 2354-5923 [employer]. Care instructions adapted under license by [employer] (which disclaims liability or warranty for this information). If you have questions about a medical condition or this instruction, always ask your healthcare professional. [address] any warranty or liability for your use of this information. A Healthy Lifestyle: Care Instructions Your Care Instructions A healthy lifestyle can help you feel good, stay at a healthy weight, and have plenty of energy for both work and play. A healthy lifestyle is something you can share with your whole family. A healthy lifestyle also can lower your risk for serious health problems, such as high blood pressure, heart disease, and diabetes. You can follow a few steps listed below to improve your health and the health of your family. Follow-up care is a key part of your treatment and safety.  Be sure to make and go to all appointments, and call your doctor if you are having problems. It's also a good idea to know your test results and keep a list of the medicines you take. How can you care for yourself at home? · Do not eat too much sugar, fat, or fast foods. You can still have dessert and treats now and then. The goal is moderation. · Start small to improve your eating habits. Pay attention to portion sizes, drink less juice and soda pop, and eat more fruits and vegetables. ? Eat a healthy amount of food. A 3-ounce serving of meat, for example, is about the size of a deck of cards. Fill the rest of your plate with vegetables and whole grains. ? Limit the amount of soda and sports drinks you have every day. Drink more water when you are thirsty. ? Eat at least 5 servings of fruits and vegetables every day. It may seem like a lot, but it is not hard to reach this goal. A serving or helping is 1 piece of fruit, 1 cup of vegetables, or 2 cups of leafy, raw vegetables. Have an apple or some carrot sticks as an afternoon snack instead of a candy bar. Try to have fruits and/or vegetables at every meal. 
· Make exercise part of your daily routine. You may want to start with simple activities, such as walking, bicycling, or slow swimming. Try to be active 30 to 60 minutes every day. You do not need to do all 30 to 60 minutes all at once. For example, you can exercise 3 times a day for 10 or 20 minutes. Moderate exercise is safe for most people, but it is always a good idea to talk to your doctor before starting an exercise program. 
· Keep moving. Atiya Bake the lawn, work in the garden, or [facility]. Take the stairs instead of the elevator at work. · If you smoke, quit. People who smoke have an increased risk for heart attack, stroke, cancer, and other lung illnesses. Quitting is hard, but there are ways to boost your chance of quitting tobacco for good. ? Use nicotine gum, patches, or lozenges. ? Ask your doctor about stop-smoking programs and medicines. ? Keep trying. In addition to reducing your risk of diseases in the future, you will notice some benefits soon after you stop using tobacco. If you have shortness of breath or asthma symptoms, they will likely get better within a few weeks after you quit. · Limit how much alcohol you drink. Moderate amounts of alcohol (up to 2 drinks a day for men, 1 drink a day for women) are okay. But drinking too much can lead to liver problems, high blood pressure, and other health problems. Family health If you have a family, there are many things you can do together to improve your health. · Eat meals together as a family as often as possible. · Eat healthy foods. This includes fruits, vegetables, lean meats and dairy, and whole grains. · Include your family in your fitness plan. Most people think of activities such as jogging or tennis as the way to fitness, but there are many ways you and your family can be more active. Anything that makes you breathe hard and gets your heart pumping is exercise. Here are some tips: 
? Walk to do errands or to take your child to school or the bus. 
? Go for a family bike ride after dinner instead of watching TV. Where can you learn more? Go to http://www.gray.com/ Enter U681 in the search box to learn more about \"A Healthy Lifestyle: Care Instructions. \" Current as of: January 31, 2020               Content Version: 12.6 © 8358-5288 [employer]. Care instructions adapted under license by [employer] (which disclaims liability or warranty for this information). If you have questions about a medical condition or this instruction, always ask your healthcare professional. [place] [address] any warranty or liability for your use of this information. Medicare Wellness Visit, Female The best way to live healthy is to have a lifestyle where you eat a well-balanced diet, exercise regularly, limit alcohol use, and quit all forms of tobacco/nicotine, if applicable. Regular preventive services are another way to keep healthy. Preventive services (vaccines, screening tests, monitoring & exams) can help personalize your care plan, which helps you manage your own care. Screening tests can find health problems at the earliest stages, when they are easiest to treat. [facility] follows the current, evidence-based guidelines published by the [facility] ([facility]) when recommending preventive services for our patients. Because we follow these guidelines, sometimes recommendations change over time as research supports it. (For example, mammograms used to be recommended annually. Even though Medicare will still pay for an annual mammogram, the newer guidelines recommend a mammogram every two years for women of average risk). Of course, you and your doctor may decide to screen more often for some diseases, based on your risk and your co-morbidities (chronic disease you are already diagnosed with). Preventive services for you include: - Medicare offers their members a free annual wellness visit, which is time for you and your primary care provider to discuss and plan for your preventive service needs. Take advantage of this benefit every year! 
-All adults over the age of 72 should receive the recommended pneumonia vaccines. Current USPSTF guidelines recommend a series of two vaccines for the best pneumonia protection.  
-All adults should have a flu vaccine yearly and a tetanus vaccine every 10 years.  
-All adults age 48 and older should receive the shingles vaccines (series of two vaccines).      
-All adults age 38-68 who are overweight should have a diabetes screening test once every three years.  
-All adults born between [birth year] and 1963 should be screened once for Hepatitis C. 
-Other screening tests and preventive services for persons with diabetes include: an eye exam to screen for diabetic retinopathy, a kidney function test, a foot exam, and stricter control over your cholesterol.  
-Cardiovascular screening for adults with routine risk involves an electrocardiogram (ECG) at intervals determined by your doctor.  
-Colorectal cancer screenings should be done for adults age 54-65 with no increased risk factors for colorectal cancer. There are a number of acceptable methods of screening for this type of cancer. Each test has its own benefits and drawbacks. Discuss with your doctor what is most appropriate for you during your annual wellness visit. The different tests include: colonoscopy (considered the best screening method), a fecal occult blood test, a fecal DNA test, and sigmoidoscopy. 
 
-A bone mass density test is recommended when a woman turns 65 to screen for osteoporosis. This test is only recommended one time, as a screening. Some providers will use this same test as a disease monitoring tool if you already have osteoporosis. -Breast cancer screenings are recommended every other year for women of normal risk, age 54-69. 
-Cervical cancer screenings for women over age 72 are only recommended with certain risk factors. Here is a list of your current Health Maintenance items (your personalized list of preventive services) with a due date: 
Health Maintenance Due Topic Date Due  Shingles Vaccine (1 of 2) 10/24[birth year]2002 [facility] Annual Well Visit  11/28/2019  Yearly Flu Vaccine (1) 09/01/2020

## 2020-11-23 ENCOUNTER — HOSPITAL ENCOUNTER (OUTPATIENT)
Dept: LAB | Age: 70
Discharge: HOME | End: 2020-11-23
Payer: MEDICARE

## 2020-11-23 PROCEDURE — 85025 COMPLETE CBC W/AUTO DIFF WBC: CPT

## 2020-11-23 PROCEDURE — 80061 LIPID PANEL: CPT

## 2020-11-23 PROCEDURE — 80053 COMPREHEN METABOLIC PANEL: CPT

## 2020-11-23 PROCEDURE — 36415 COLL VENOUS BLD VENIPUNCTURE: CPT

## 2020-11-24 LAB
ALBUMIN SERPL-MCNC: 4.2 G/DL (ref 3.8–4.8)
ALBUMIN/GLOB SERPL: 1.4 {RATIO} (ref 1.2–2.2)
ALP SERPL-CCNC: 118 IU/L (ref 39–117)
ALT SERPL-CCNC: 9 IU/L (ref 0–32)
AST SERPL-CCNC: 25 IU/L (ref 0–40)
BASOPHILS # BLD AUTO: 0.1 X10E3/UL (ref 0–0.2)
BASOPHILS NFR BLD AUTO: 1 %
BILIRUB SERPL-MCNC: <0.2 MG/DL (ref 0–1.2)
BUN SERPL-MCNC: 9 MG/DL (ref 8–27)
BUN/CREAT SERPL: 10 (ref 12–28)
CALCIUM SERPL-MCNC: 9.4 MG/DL (ref 8.7–10.3)
CHLORIDE SERPL-SCNC: 99 MMOL/L (ref 96–106)
CHOLEST SERPL-MCNC: 173 MG/DL (ref 100–199)
CO2 SERPL-SCNC: 24 MMOL/L (ref 20–29)
CREAT SERPL-MCNC: 0.91 MG/DL (ref 0.57–1)
EOSINOPHIL # BLD AUTO: 0.1 X10E3/UL (ref 0–0.4)
EOSINOPHIL NFR BLD AUTO: 2 %
ERYTHROCYTE [DISTWIDTH] IN BLOOD BY AUTOMATED COUNT: 11.4 % (ref 11.7–15.4)
GLOBULIN SER CALC-MCNC: 3 G/DL (ref 1.5–4.5)
GLUCOSE SERPL-MCNC: 94 MG/DL (ref 65–99)
HCT VFR BLD AUTO: 33.6 % (ref 34–46.6)
HDLC SERPL-MCNC: 102 MG/DL
HGB BLD-MCNC: 11.4 G/DL (ref 11.1–15.9)
IMM GRANULOCYTES # BLD AUTO: 0 X10E3/UL (ref 0–0.1)
IMM GRANULOCYTES NFR BLD AUTO: 0 %
INTERPRETATION, 910389: NORMAL
LDLC SERPL CALC-MCNC: 53 MG/DL (ref 0–99)
LYMPHOCYTES # BLD AUTO: 1.7 X10E3/UL (ref 0.7–3.1)
LYMPHOCYTES NFR BLD AUTO: 30 %
MCH RBC QN AUTO: 29.3 PG (ref 26.6–33)
MCHC RBC AUTO-ENTMCNC: 33.9 G/DL (ref 31.5–35.7)
MCV RBC AUTO: 86 FL (ref 79–97)
MONOCYTES # BLD AUTO: 0.7 X10E3/UL (ref 0.1–0.9)
MONOCYTES NFR BLD AUTO: 11 %
NEUTROPHILS # BLD AUTO: 3.2 X10E3/UL (ref 1.4–7)
NEUTROPHILS NFR BLD AUTO: 56 %
PLATELET # BLD AUTO: 387 X10E3/UL (ref 150–450)
POTASSIUM SERPL-SCNC: 4.7 MMOL/L (ref 3.5–5.2)
PROT SERPL-MCNC: 7.2 G/DL (ref 6–8.5)
RBC # BLD AUTO: 3.89 X10E6/UL (ref 3.77–5.28)
SODIUM SERPL-SCNC: 136 MMOL/L (ref 134–144)
TRIGL SERPL-MCNC: 107 MG/DL (ref 0–149)
VLDLC SERPL CALC-MCNC: 18 MG/DL (ref 5–40)
WBC # BLD AUTO: 5.7 X10E3/UL (ref 3.4–10.8)

## 2020-12-02 NOTE — PROGRESS NOTES
Ana - send thyroid results to Dr. Bijal Earl
Faxed labs to Dr. Balaji Lorenzo at [phone]
Patient is followed by endocrinology
The results were reviewed. TSH is suppressed at 0.006.  Remaining labs are normal.
11

## 2021-02-16 DIAGNOSIS — I10 ESSENTIAL HYPERTENSION: ICD-10-CM

## 2021-02-16 RX ORDER — ATENOLOL 50 MG/1
TABLET ORAL
Qty: 90 TAB | Refills: 3 | Status: SHIPPED | OUTPATIENT
Start: 2021-02-16 | End: 2022-02-16

## 2021-02-16 RX ORDER — AMLODIPINE BESYLATE 5 MG/1
TABLET ORAL
Qty: 90 TAB | Refills: 3 | Status: SHIPPED | OUTPATIENT
Start: 2021-02-16 | End: 2022-02-16

## 2021-02-17 RX ORDER — CANDESARTAN 16 MG/1
16 TABLET ORAL DAILY
Qty: 90 TAB | Refills: 3 | Status: SHIPPED | OUTPATIENT
Start: 2021-02-17 | End: 2022-02-16

## 2021-02-17 NOTE — TELEPHONE ENCOUNTER
Last OV: 11/18/20  Next Appt: 5/19/21  Last Refill: 2/26/20    Requested Prescriptions     Pending Prescriptions Disp Refills    candesartan (ATACAND) 16 mg tablet 90 Tab 3     Sig: Take 1 Tab by mouth daily.

## 2021-05-19 ENCOUNTER — OFFICE VISIT (OUTPATIENT)
Dept: FAMILY MEDICINE CLINIC | Age: 71
End: 2021-05-19
Payer: MEDICARE

## 2021-05-19 VITALS
HEART RATE: 70 BPM | WEIGHT: 149.8 LBS | RESPIRATION RATE: 12 BRPM | BODY MASS INDEX: 26.54 KG/M2 | OXYGEN SATURATION: 100 % | HEIGHT: 63 IN | SYSTOLIC BLOOD PRESSURE: 130 MMHG | DIASTOLIC BLOOD PRESSURE: 73 MMHG | TEMPERATURE: 97.4 F

## 2021-05-19 DIAGNOSIS — I10 ESSENTIAL HYPERTENSION: Primary | ICD-10-CM

## 2021-05-19 DIAGNOSIS — E05.90 HYPERTHYROIDISM: ICD-10-CM

## 2021-05-19 DIAGNOSIS — D86.9 SARCOIDOSIS: ICD-10-CM

## 2021-05-19 PROCEDURE — 1101F PT FALLS ASSESS-DOCD LE1/YR: CPT | Performed by: NURSE PRACTITIONER

## 2021-05-19 PROCEDURE — G8427 DOCREV CUR MEDS BY ELIG CLIN: HCPCS | Performed by: NURSE PRACTITIONER

## 2021-05-19 PROCEDURE — G9899 SCRN MAM PERF RSLTS DOC: HCPCS | Performed by: NURSE PRACTITIONER

## 2021-05-19 PROCEDURE — G8752 SYS BP LESS 140: HCPCS | Performed by: NURSE PRACTITIONER

## 2021-05-19 PROCEDURE — G8419 CALC BMI OUT NRM PARAM NOF/U: HCPCS | Performed by: NURSE PRACTITIONER

## 2021-05-19 PROCEDURE — G8399 PT W/DXA RESULTS DOCUMENT: HCPCS | Performed by: NURSE PRACTITIONER

## 2021-05-19 PROCEDURE — 99214 OFFICE O/P EST MOD 30 MIN: CPT | Performed by: NURSE PRACTITIONER

## 2021-05-19 PROCEDURE — G8432 DEP SCR NOT DOC, RNG: HCPCS | Performed by: NURSE PRACTITIONER

## 2021-05-19 PROCEDURE — 1090F PRES/ABSN URINE INCON ASSESS: CPT | Performed by: NURSE PRACTITIONER

## 2021-05-19 PROCEDURE — G8536 NO DOC ELDER MAL SCRN: HCPCS | Performed by: NURSE PRACTITIONER

## 2021-05-19 PROCEDURE — 3017F COLORECTAL CA SCREEN DOC REV: CPT | Performed by: NURSE PRACTITIONER

## 2021-05-19 PROCEDURE — G8754 DIAS BP LESS 90: HCPCS | Performed by: NURSE PRACTITIONER

## 2021-05-19 PROCEDURE — G0463 HOSPITAL OUTPT CLINIC VISIT: HCPCS | Performed by: NURSE PRACTITIONER

## 2021-05-19 NOTE — PATIENT INSTRUCTIONS
A Healthy Lifestyle: Care Instructions Your Care Instructions A healthy lifestyle can help you feel good, stay at a healthy weight, and have plenty of energy for both work and play. A healthy lifestyle is something you can share with your whole family. A healthy lifestyle also can lower your risk for serious health problems, such as high blood pressure, heart disease, and diabetes. You can follow a few steps listed below to improve your health and the health of your family. Follow-up care is a key part of your treatment and safety. Be sure to make and go to all appointments, and call your doctor if you are having problems. It's also a good idea to know your test results and keep a list of the medicines you take. How can you care for yourself at home? · Do not eat too much sugar, fat, or fast foods. You can still have dessert and treats now and then. The goal is moderation. · Start small to improve your eating habits. Pay attention to portion sizes, drink less juice and soda pop, and eat more fruits and vegetables. ? Eat a healthy amount of food. A 3-ounce serving of meat, for example, is about the size of a deck of cards. Fill the rest of your plate with vegetables and whole grains. ? Limit the amount of soda and sports drinks you have every day. Drink more water when you are thirsty. ? Eat plenty of fruits and vegetables every day. Have an apple or some carrot sticks as an afternoon snack instead of a candy bar. Try to have fruits and/or vegetables at every meal. 
· Make exercise part of your daily routine. You may want to start with simple activities, such as walking, bicycling, or slow swimming. Try to be active 30 to 60 minutes every day. You do not need to do all 30 to 60 minutes all at once. For example, you can exercise 3 times a day for 10 or 20 minutes.  Moderate exercise is safe for most people, but it is always a good idea to talk to your doctor before starting an exercise program. 
· Keep moving. Javon Thee the lawn, work in the garden, or [employer]. Take the stairs instead of the elevator at work. · If you smoke, quit. People who smoke have an increased risk for heart attack, stroke, cancer, and other lung illnesses. Quitting is hard, but there are ways to boost your chance of quitting tobacco for good. ? Use nicotine gum, patches, or lozenges. ? Ask your doctor about stop-smoking programs and medicines. ? Keep trying. In addition to reducing your risk of diseases in the future, you will notice some benefits soon after you stop using tobacco. If you have shortness of breath or asthma symptoms, they will likely get better within a few weeks after you quit. · Limit how much alcohol you drink. Moderate amounts of alcohol (up to 2 drinks a day for men, 1 drink a day for women) are okay. But drinking too much can lead to liver problems, high blood pressure, and other health problems. Family health If you have a family, there are many things you can do together to improve your health. · Eat meals together as a family as often as possible. · Eat healthy foods. This includes fruits, vegetables, lean meats and dairy, and whole grains. · Include your family in your fitness plan. Most people think of activities such as jogging or tennis as the way to fitness, but there are many ways you and your family can be more active. Anything that makes you breathe hard and gets your heart pumping is exercise. Here are some tips: 
? Walk to do errands or to take your child to school or the bus. 
? Go for a family bike ride after dinner instead of watching TV. Where can you learn more? Go to http://www.gray.com/ Enter W087 in the search box to learn more about \"A Healthy Lifestyle: Care Instructions. \" Current as of: September 23, 2020               Content Version: 12.8 © 4780-1163 [employer].   
Care instructions adapted under license by [employer] (which disclaims liability or warranty for this information). If you have questions about a medical condition or this instruction, always ask your healthcare professional. [address] any warranty or liability for your use of this information.

## 2021-05-19 NOTE — PROGRESS NOTES
Chief Complaint   Patient presents with    Hypertension    Thyroid Problem       1. Have you been to the ER, urgent care clinic since your last visit? Hospitalized since your last visit? No    2. Have you seen or consulted any other health care providers outside of the [address] [place] [address] since your last visit? Include any pap smears or colon screening. Yes, endo for thyroid follow up.      Health Maintenance Due   Topic Date Due    Shingrix Vaccine Age 49> (1 of 2) Never done

## 2021-05-19 NOTE — PROGRESS NOTES
Lamar Chapa (: 1950) is a 78 y.o. female, established patient, here for evaluation of the following chief complaint(s):  Hypertension and Thyroid Problem       ASSESSMENT/PLAN:  Below is the assessment and plan developed based on review of pertinent history, physical exam, labs, studies, and medications. 1. Essential hypertension - stable. Labs in 2020 normal.  FLP normal.  Will check labs at next visit. Patient to come fasting. 2. Hyperthyroidism - followed by endocrinology - states she is off methimazole until 21 and will resume only at 3 days per week. 3. Sarcoidosis,lung - followed by pulmonary. Has appt in Aug 2021    Return in about 6 months (around 2021). SUBJECTIVE/OBJECTIVE:  HPI  Patient comes in today for follow up HTN, thyroid    Dr. Ila Flores - saw 10/12/2020. took her off methimazole on 2021. Start back on 21 at 10mg 3 times weekly. Hx pulmonary sarcoid. saw Dr. Kamaljit Dos Santos in 2020, had CXR. Will have PFTs next year. Given clear to follow up in 1 year.  lungs were stable.      Hx of osteopenia, saw Dr. Marbin Salinas in AUG 2020. She has been off fosamax, had DEXA done 20. Continued on calcium with vitamin D.  Plan to repeat DEXA in  and if needed, would consider Prolia. Colonoscopy in  with Dr. Sheng Sheehan. .  states no polyps were found.   Did Cologuard last year - negative    Hx HTN, states compliance with medications.  Tries to follow low sodium diet.  Denies chest pains, palpitations, dizziness, dyspnea, tingling in extremities.  Urinating without difficulty  Allergies   Allergen Reactions    Lisinopril Swelling and Cough     Lips swell    Pcn [Penicillins] Hives    Hydrochlorothiazide Unknown (comments)     NA down, fatigue , myalgia       Past Medical History:   Diagnosis Date    Arthritis     OA    COPD     d/t sarcoidosis    Hypertension     Hyponatremia     FOLLOWING TREATMENT WITH HCTZ; NOW RESOLVED 12    Osteopenia     Post-menopause     Sarcoidosis     nonsymptomatic       Past Surgical History:   Procedure Laterality Date    BONE MARROW ASPIRATE &BIOPSY      SARCOIDOSIS DIAGNOSED    HX DILATION AND CURETTAGE  1992    HX HIP REPLACEMENT  2012    right    HX HIP REPLACEMENT Left 2016    HX ORTHOPAEDIC      excision right wrist cyst    HX OTHER SURGICAL  1979    BRONCHOSCOPY    HX TUBAL LIGATION  1988       Social History     Socioeconomic History    Marital status:      Spouse name: Not on file    Number of children: Not on file    Years of education: Not on file    Highest education level: Not on file   Occupational History    Not on file   Tobacco Use    Smoking status: Former Smoker     Packs/day: 0.50     Years: 30.00     Pack years: 15.00     Quit date: 2012     Years since quittin.0    Smokeless tobacco: Never Used   Vaping Use    Vaping Use: Never used   Substance and Sexual Activity    Alcohol use: Yes     Alcohol/week: 28.0 standard drinks     Types: 28 Cans of beer per week     Comment: 3-4 beers daily, may have 6 beers on weekends    Drug use: No    Sexual activity: Never   Other Topics Concern    Not on file   Social History Narrative    Retired from [employer]. . 3 children, 2 sons, 1 daughter     Social Determinants of Health     Financial Resource Strain:     Difficulty of Paying Living Expenses:    Food Insecurity:     Worried About Running Out of Food in the Last Year:     [address] in the Last Year:    Transportation Needs:     Lack of Transportation (Medical):      Lack of Transportation (Non-Medical):    Physical Activity:     Days of Exercise per Week:     Minutes of Exercise per Session:    Stress:     Feeling of Stress :    Social Connections:     Frequency of Communication with Friends and Family:     Frequency of Social Gatherings with Friends and Family:     Attends Protestant Services:     Active Member of Clubs or Organizations:     Attends Club or Organization Meetings:     Marital Status:    Intimate Partner Violence:     Fear of Current or Ex-Partner:     Emotionally Abused:     Physically Abused:     Sexually Abused:        Family History   Problem Relation Age of Onset   [de-identified] Arthritis-rheumatoid Mother     Hypertension Mother     Elevated Lipids Mother     Cancer Mother         bladder    Stroke Mother     Elevated Lipids Sister     Hypertension Sister     Diabetes Sister     Elevated Lipids Sister     Hypertension Sister     Other Son         SARCOIDOSIS, KERATOCONUS    Other Daughter         KERATOCONUS    Anesth Problems Neg Hx        Current Outpatient Medications   Medication Sig    candesartan (ATACAND) 16 mg tablet Take 1 Tab by mouth daily.  atenoloL (TENORMIN) 50 mg tablet TAKE 1 TABLET BY MOUTH DAILY    amLODIPine (NORVASC) 5 mg tablet TAKE 1 TABLET BY MOUTH DAILY    methIMAzole (TAPAZOLE) 10 mg tablet Take 1 Tab by mouth daily. (Patient taking differently: Take 10 mg by mouth daily. Pt currently stopped. Will restart 5/31/21 on Mon, Wed, Fri, Once daily.)    latanoprost (XALATAN) 0.005 % ophthalmic solution Apply 1 Drop to eye nightly.  fexofenadine (ALLEGRA) 180 mg tablet Take 1 Tab by mouth daily as needed for Itching.  multivitamin (ONE A DAY) tablet Take 1 Tab by mouth daily.  calcium carbonate-vitamin D3 1,000 mg(2,500 mg)-800 unit Tab Take 1 Tab by mouth daily. No current facility-administered medications for this visit. Review of Systems   Constitutional: Negative for chills, diaphoresis, fatigue, fever and unexpected weight change. Respiratory: Negative for cough and shortness of breath. Cardiovascular: Negative for chest pain, palpitations and leg swelling. Gastrointestinal: Negative for abdominal pain, blood in stool, constipation, diarrhea, nausea and vomiting. Endocrine: Negative for cold intolerance and heat intolerance.    Genitourinary: Negative for dysuria, flank pain, frequency, hematuria and urgency. Musculoskeletal: Negative for back pain and myalgias. Skin: Negative. Neurological: Negative for dizziness, speech difficulty, light-headedness, numbness and headaches. Psychiatric/Behavioral: Negative for dysphoric mood and sleep disturbance. The patient is not nervous/anxious. Vitals:    05/19/21 1045   BP: 130/73   Pulse: 70   Resp: 12   Temp: 97.4 °F (36.3 °C)   TempSrc: Oral   SpO2: 100%   Weight: 149 lb 12.8 oz (67.9 kg)   Height: 5' 3\" (1.6 m)     Physical Exam  Vitals reviewed. Constitutional:       Appearance: Normal appearance. She is well-developed, well-groomed and normal weight. HENT:      Right Ear: Hearing normal.      Left Ear: Hearing normal.   Neck:      Thyroid: No thyromegaly. Cardiovascular:      Rate and Rhythm: Normal rate and regular rhythm. Pulses:           Dorsalis pedis pulses are 2+ on the right side and 2+ on the left side. Heart sounds: Normal heart sounds, S1 normal and S2 normal.   Pulmonary:      Effort: Pulmonary effort is normal.      Breath sounds: Normal breath sounds. Abdominal:      General: Bowel sounds are normal.      Palpations: Abdomen is soft. Tenderness: There is no abdominal tenderness. Musculoskeletal:      Right lower leg: No edema. Left lower leg: No edema. Lymphadenopathy:      Cervical: No cervical adenopathy. Skin:     General: Skin is warm and dry. Neurological:      Mental Status: She is alert and oriented to person, place, and time. Psychiatric:         Attention and Perception: Attention normal.         Mood and Affect: Mood and affect normal.         Speech: Speech normal.         Behavior: Behavior normal. Behavior is cooperative. Thought Content:  Thought content normal.         Cognition and Memory: Cognition and memory normal.       On this date 05/19/2021 I have spent 30 minutes reviewing previous notes, test results and face to face with the patient discussing the diagnosis and importance of compliance with the treatment plan as well as documenting on the day of the visit. An electronic signature was used to authenticate this note.   -- Roselia Mays NP

## 2021-07-02 ENCOUNTER — TRANSCRIBE ORDER (OUTPATIENT)
Dept: SCHEDULING | Age: 71
End: 2021-07-02

## 2021-07-02 DIAGNOSIS — Z12.31 VISIT FOR SCREENING MAMMOGRAM: Primary | ICD-10-CM

## 2021-07-22 ENCOUNTER — HOSPITAL ENCOUNTER (OUTPATIENT)
Dept: MAMMOGRAPHY | Age: 71
Discharge: HOME | End: 2021-07-22
Payer: MEDICARE

## 2021-07-22 DIAGNOSIS — Z12.31 VISIT FOR SCREENING MAMMOGRAM: ICD-10-CM

## 2021-07-22 PROCEDURE — 77067 SCR MAMMO BI INCL CAD: CPT

## 2021-08-09 ENCOUNTER — HOSPITAL ENCOUNTER (OUTPATIENT)
Dept: GENERAL RADIOLOGY | Age: 71
Discharge: HOME | End: 2021-08-09
Payer: MEDICARE

## 2021-08-09 ENCOUNTER — TRANSCRIBE ORDER (OUTPATIENT)
Dept: REGISTRATION | Age: 71
End: 2021-08-09

## 2021-08-09 DIAGNOSIS — D86.9 SARCOIDOSIS: ICD-10-CM

## 2021-08-09 DIAGNOSIS — D86.9 SARCOIDOSIS: Primary | ICD-10-CM

## 2021-08-09 PROCEDURE — 71046 X-RAY EXAM CHEST 2 VIEWS: CPT

## 2021-08-12 ENCOUNTER — OFFICE VISIT (OUTPATIENT)
Dept: RHEUMATOLOGY | Age: 71
End: 2021-08-12
Payer: MEDICARE

## 2021-08-12 VITALS
BODY MASS INDEX: 24.98 KG/M2 | DIASTOLIC BLOOD PRESSURE: 83 MMHG | RESPIRATION RATE: 18 BRPM | HEART RATE: 67 BPM | WEIGHT: 141 LBS | SYSTOLIC BLOOD PRESSURE: 142 MMHG | TEMPERATURE: 98.2 F

## 2021-08-12 DIAGNOSIS — Z78.0 POST-MENOPAUSAL: Primary | ICD-10-CM

## 2021-08-12 PROCEDURE — G8420 CALC BMI NORM PARAMETERS: HCPCS | Performed by: INTERNAL MEDICINE

## 2021-08-12 PROCEDURE — 1101F PT FALLS ASSESS-DOCD LE1/YR: CPT | Performed by: INTERNAL MEDICINE

## 2021-08-12 PROCEDURE — G8399 PT W/DXA RESULTS DOCUMENT: HCPCS | Performed by: INTERNAL MEDICINE

## 2021-08-12 PROCEDURE — G8754 DIAS BP LESS 90: HCPCS | Performed by: INTERNAL MEDICINE

## 2021-08-12 PROCEDURE — G9899 SCRN MAM PERF RSLTS DOC: HCPCS | Performed by: INTERNAL MEDICINE

## 2021-08-12 PROCEDURE — G8427 DOCREV CUR MEDS BY ELIG CLIN: HCPCS | Performed by: INTERNAL MEDICINE

## 2021-08-12 PROCEDURE — G8536 NO DOC ELDER MAL SCRN: HCPCS | Performed by: INTERNAL MEDICINE

## 2021-08-12 PROCEDURE — G8753 SYS BP > OR = 140: HCPCS | Performed by: INTERNAL MEDICINE

## 2021-08-12 PROCEDURE — G8510 SCR DEP NEG, NO PLAN REQD: HCPCS | Performed by: INTERNAL MEDICINE

## 2021-08-12 PROCEDURE — 3017F COLORECTAL CA SCREEN DOC REV: CPT | Performed by: INTERNAL MEDICINE

## 2021-08-12 PROCEDURE — G0463 HOSPITAL OUTPT CLINIC VISIT: HCPCS | Performed by: INTERNAL MEDICINE

## 2021-08-12 PROCEDURE — 1090F PRES/ABSN URINE INCON ASSESS: CPT | Performed by: INTERNAL MEDICINE

## 2021-08-12 PROCEDURE — 99213 OFFICE O/P EST LOW 20 MIN: CPT | Performed by: INTERNAL MEDICINE

## 2021-08-12 NOTE — LETTER
8/12/2021    Patient: Justice Valenzuela   YOB: 1950   Date of Visit: 8/12/2021     Lauren Chairez NP  [address] [place] [address]  Via In Basket    Dear Lauren Chairez NP,      Thank you for referring Ms. Jennifer Gomes to [facility] for evaluation. My notes for this consultation are attached. If you have questions, please do not hesitate to call me. I look forward to following your patient along with you.       Sincerely,    Chace Mccullough MD

## 2021-08-12 NOTE — PROGRESS NOTES
REASON FOR VISIT    This is the a follow up visit for Ms. Hawkins for     ICD-10-CM   1. Osteopenia of left forearm  M85.832      Osteoporosis Historical Synopsis    Height loss since age 29 (at least two inches): 1  Fracture history includes: no  Family history of hip fracture: no  Fall Risk: no    Daily calcium intake is 1200 mg  Daily vitamin D intake is 800 IU    Smoking history: yes (quit 2012)  Alcohol consumption: yes (three to four - 12 ounce beers about 4 days per week)  Prednisone history: yes (history of chronic daily prednisone [id number] for sarcoidosis of lung)    Exercise: yes    Previous work-up for osteoporosis includes the following:  DEXA Scan: 7/20/2020  Vitamin 25OH D level: 41.2 (10/22/2020)  PTH: 37 (10/22/2020  TSH: 0.006 (10/22/2020)    Therapy History includes:  Current osteoporosis therapy includes: none  Prior osteoporosis therapy includes: alendronate 70 mg weekly (6/2013 to 6/06/2018; 5 years)  The following osteoporosis therapy have been ineffective: non  The following osteoporosis therapy were stopped because of side effects: none    HISTORY OF PRESENT ILLNESS      Ms. Christian Luque presents for follow up. On her last visit, I placed her on a drug holiday with plans for repeat DXA on 7/20/2022. I also ordered labs. Today, she feels well. No falls or fractures since last visit. She received her COVID vaccine. She follows with pulmonary for sarcoidosis. Her last flare was around 33 years ago, where she was treated with prednisone. She denies fever, weight loss, blurred vision, vision loss, oral ulcers, ankle swelling, dry cough, dyspnea, nausea, vomiting, dysphagia, abdominal pain, black or bloody stool, fall since last visit, rash, easy bruising and increased thirst.    REVIEW OF SYSTEMS    A comprehensive review of systems was performed and pertinent results are documented in the HPI, review of systems is otherwise non-contributory.     PAST MEDICAL HISTORY    She has a past medical history of Arthritis, COPD, Hypertension, Hyponatremia (2009), Osteopenia, Post-menopause, and Sarcoidosis. FAMILY HISTORY    Her family history includes Arthritis-rheumatoid in her mother; Cancer in her mother; Diabetes in her sister; Elevated Lipids in her mother, sister, and sister; Hypertension in her mother, sister, and sister; Other in her daughter and son; Stroke in her mother. SOCIAL HISTORY    She reports that she quit smoking about 9 years ago. She has a 15.00 pack-year smoking history. She has never used smokeless tobacco. She reports current alcohol use of about 28.0 standard drinks of alcohol per week. She reports that she does not use drugs. MEDICATIONS    Current Outpatient Medications   Medication Sig    candesartan (ATACAND) 16 mg tablet Take 1 Tab by mouth daily.  atenoloL (TENORMIN) 50 mg tablet TAKE 1 TABLET BY MOUTH DAILY    amLODIPine (NORVASC) 5 mg tablet TAKE 1 TABLET BY MOUTH DAILY    methIMAzole (TAPAZOLE) 10 mg tablet Take 1 Tab by mouth daily. (Patient taking differently: Take 10 mg by mouth daily. Pt currently stopped. Will restart 5/31/21 on Mon, Wed, Fri, Once daily.)    latanoprost (XALATAN) 0.005 % ophthalmic solution Apply 1 Drop to eye nightly.  fexofenadine (ALLEGRA) 180 mg tablet Take 1 Tab by mouth daily as needed for Itching.  multivitamin (ONE A DAY) tablet Take 1 Tab by mouth daily.  calcium carbonate-vitamin D3 1,000 mg(2,500 mg)-800 unit Tab Take 1 Tab by mouth daily. No current facility-administered medications for this visit. ALLERGIES    Allergies   Allergen Reactions    Lisinopril Swelling and Cough     Lips swell    Pcn [Penicillins] Hives    Hydrochlorothiazide Unknown (comments)     NA down, fatigue , myalgia       PHYSICAL EXAMINATION    Visit Vitals  BP (!) 142/83   Pulse 67   Temp 98.2 °F (36.8 °C)   Resp 18   Wt 141 lb (64 kg)   BMI 24.98 kg/m²     Body mass index is 24.98 kg/m².     General: Patient is alert, oriented x 3, not in acute distress    HEENT:   Conjunctiva are not injected and appear moist, there is no alopecia. Chest:  Breathing comfortably at room air    Neurological exam:  Muscle strength is full in upper and lower extremities. Skin exam:  There are no rashes, no active Raynaud's     Musculoskeletal exam:  No synovitis. DATA REVIEW    Laboratory     Recent laboratory results were reviewed, summarized, and discussed with the patient. Imaging    Musculoskeletal Ultrasound    None    Radiographs    None    CT Imaging    None    MR Imaging    None    DXA     DXA 7/20/2020: (excluded s/p bilateral hip replacements) lumbar spine L1-L4 T score 1.0 (BMD 1.319 g/cm2), and distal one third left radius T score -1.3 (BMD 0.760 g/cm2). FRAX score N/A % probability in 10 years for major osteoporotic fracture and N/A % 10 year probability of hip fracture. DXA 7/16/2018: (excluded Both hips for replacement) lumbar spine L1-L4 T score 0.1 (BMD 1.156 g/cm2), distal one third left radius T score -0.3 (BMD 0.664 g/cm2). FRAX score 5.3 % probability in 10 years for major osteoporotic fracture and 0.8 % 10 year probability of hip fracture. I personally reviewed the images of this study. DXA 5/12/2015: (excluded right femoral neck, right total hip) lumbar spine L1-L4 T score -0.1 (BMD 1.137 g/cm2), left femoral neck T score: -2.0 (0.661 g/cm2), left total hip T score: -1.6 (0.784 g/cm2), and distal one third left radius T score -0.2 (BMD 0.684 g/cm2). FRAX score N/A % probability in 10 years for major osteoporotic fracture and N/A % 10 year probability of hip fracture. DXA 2/01/2013: (excluded right femoral hip, right total hip) lumbar spine L1-L4 T score -1.0 (BMD 1.041 g/cm2), left femoral neck T score: -2.3 (0.624 g/cm2), left total hip T score: -1.7 (0.773 g/cm2), and distal one third left radius T score -0.1 (BMD 0.688 g/cm2).  FRAX score 4.3 % probability in 10 years for major osteoporotic fracture and 0.9 % 10 year probability of hip fracture. ASSESSMENT AND PLAN    1) Post-Menopausal with Abnormal Bone Density. Her most recent DXA scan 7/2020 showed a distal one third left radius T score -1.3 (BMD 0.760 g/cm2). She had bilateral hip replacements due to degenerative arthritis. No history of fracture. She had been on alendronate 70 mg weekly 6/2013 to 6/06/2018, which is a total of 5 years. Nayan Rueda al reported that the risk of atypical femoral fractures is low, with an incidence of up to 50 per 100,000 person-years during the first 5 years of bisphosphonate use, resulting in a clear positive benefit/risk ratio within this time frame (J Bone [place] Res. 2016 Jan;31(1):16-35). I ordered a repeat DXA in 7/21/2022. If treatment is necessary, I will recommend Prolia     2) Vitamin D Deficiency. Her vitamin D level was 41.2 (previously 52.7, 31.2, 15). The patient voiced understanding of the aforementioned assessment and plan. A total of 26 minutes was spent on this visit, reviewing interval notes, interval testing results, ordering tests, refilling medications, documenting the findings in the note, patient education, counseling, and coordination of care as described above. All questions asked and answered.     TODAY'S ORDERS    Orders Placed This Encounter    DEXA BONE DENSITY STUDY AXIAL     Future Appointments   Date Time Provider Mehdi Cohen   11/19/2021 10:30 AM Severo Temple NP [facility] BS AMB   8/15/2022 10:40 AM Chandler Day MD [facility] BS AMB     Chris Dos Santos MD, [address] [place] [address]    Adult Rheumatology   Rheumatology Ultrasound Certified  [facility]  A Part of [facility], [address]   Phone [phone]  Fax [phone]

## 2021-11-19 ENCOUNTER — OFFICE VISIT (OUTPATIENT)
Dept: FAMILY MEDICINE CLINIC | Age: 71
End: 2021-11-19
Payer: MEDICARE

## 2021-11-19 VITALS
TEMPERATURE: 97.7 F | SYSTOLIC BLOOD PRESSURE: 123 MMHG | DIASTOLIC BLOOD PRESSURE: 74 MMHG | HEIGHT: 63 IN | BODY MASS INDEX: 26.01 KG/M2 | WEIGHT: 146.8 LBS | HEART RATE: 63 BPM | OXYGEN SATURATION: 100 % | RESPIRATION RATE: 12 BRPM

## 2021-11-19 DIAGNOSIS — M85.80 OSTEOPENIA, UNSPECIFIED LOCATION: ICD-10-CM

## 2021-11-19 DIAGNOSIS — Z23 NEEDS FLU SHOT: ICD-10-CM

## 2021-11-19 DIAGNOSIS — E05.90 HYPERTHYROIDISM: ICD-10-CM

## 2021-11-19 DIAGNOSIS — D86.9 SARCOIDOSIS: ICD-10-CM

## 2021-11-19 DIAGNOSIS — Z00.00 MEDICARE ANNUAL WELLNESS VISIT, SUBSEQUENT: Primary | ICD-10-CM

## 2021-11-19 DIAGNOSIS — I10 ESSENTIAL HYPERTENSION: ICD-10-CM

## 2021-11-19 LAB
ANION GAP SERPL CALC-SCNC: 5 MMOL/L (ref 5–15)
BUN SERPL-MCNC: 6 MG/DL (ref 6–20)
BUN/CREAT SERPL: 6 (ref 12–20)
CALCIUM SERPL-MCNC: 9.3 MG/DL (ref 8.5–10.1)
CHLORIDE SERPL-SCNC: 101 MMOL/L (ref 97–108)
CHOLEST SERPL-MCNC: 178 MG/DL
CO2 SERPL-SCNC: 27 MMOL/L (ref 21–32)
CREAT SERPL-MCNC: 0.96 MG/DL (ref 0.55–1.02)
ERYTHROCYTE [DISTWIDTH] IN BLOOD BY AUTOMATED COUNT: 13 % (ref 11.5–14.5)
GLUCOSE SERPL-MCNC: 88 MG/DL (ref 65–100)
HCT VFR BLD AUTO: 34.8 % (ref 35–47)
HDLC SERPL-MCNC: 106 MG/DL
HDLC SERPL: 1.7 {RATIO} (ref 0–5)
HGB BLD-MCNC: 11.3 G/DL (ref 11.5–16)
LDLC SERPL CALC-MCNC: 51 MG/DL (ref 0–100)
MCH RBC QN AUTO: 29.1 PG (ref 26–34)
MCHC RBC AUTO-ENTMCNC: 32.5 G/DL (ref 30–36.5)
MCV RBC AUTO: 89.7 FL (ref 80–99)
NRBC # BLD: 0 K/UL (ref 0–0.01)
NRBC BLD-RTO: 0 PER 100 WBC
PLATELET # BLD AUTO: 386 K/UL (ref 150–400)
PMV BLD AUTO: 8.5 FL (ref 8.9–12.9)
POTASSIUM SERPL-SCNC: 4.1 MMOL/L (ref 3.5–5.1)
RBC # BLD AUTO: 3.88 M/UL (ref 3.8–5.2)
SODIUM SERPL-SCNC: 133 MMOL/L (ref 136–145)
TRIGL SERPL-MCNC: 105 MG/DL (ref ?–150)
VLDLC SERPL CALC-MCNC: 21 MG/DL
WBC # BLD AUTO: 5.4 K/UL (ref 3.6–11)

## 2021-11-19 PROCEDURE — G0439 PPPS, SUBSEQ VISIT: HCPCS | Performed by: NURSE PRACTITIONER

## 2021-11-19 PROCEDURE — G8752 SYS BP LESS 140: HCPCS | Performed by: NURSE PRACTITIONER

## 2021-11-19 PROCEDURE — 1101F PT FALLS ASSESS-DOCD LE1/YR: CPT | Performed by: NURSE PRACTITIONER

## 2021-11-19 PROCEDURE — G8419 CALC BMI OUT NRM PARAM NOF/U: HCPCS | Performed by: NURSE PRACTITIONER

## 2021-11-19 PROCEDURE — G8399 PT W/DXA RESULTS DOCUMENT: HCPCS | Performed by: NURSE PRACTITIONER

## 2021-11-19 PROCEDURE — G9899 SCRN MAM PERF RSLTS DOC: HCPCS | Performed by: NURSE PRACTITIONER

## 2021-11-19 PROCEDURE — G8754 DIAS BP LESS 90: HCPCS | Performed by: NURSE PRACTITIONER

## 2021-11-19 PROCEDURE — G8427 DOCREV CUR MEDS BY ELIG CLIN: HCPCS | Performed by: NURSE PRACTITIONER

## 2021-11-19 PROCEDURE — 90694 VACC AIIV4 NO PRSRV 0.5ML IM: CPT | Performed by: NURSE PRACTITIONER

## 2021-11-19 PROCEDURE — G8432 DEP SCR NOT DOC, RNG: HCPCS | Performed by: NURSE PRACTITIONER

## 2021-11-19 PROCEDURE — G8536 NO DOC ELDER MAL SCRN: HCPCS | Performed by: NURSE PRACTITIONER

## 2021-11-19 PROCEDURE — 3017F COLORECTAL CA SCREEN DOC REV: CPT | Performed by: NURSE PRACTITIONER

## 2021-11-19 NOTE — PROGRESS NOTES
Balaji Beltre (: 1950) is a 73 y.o. female, established patient, here for evaluation of the following chief complaint(s):  Hypertension and Thyroid Problem       ASSESSMENT/PLAN:  Below is the assessment and plan developed based on review of pertinent history, physical exam, labs, studies, and medications. 1. Medicare annual wellness visit, subsequent  2. Essential hypertension - stable. Check labs. -     CBC W/O DIFF; Future  -     METABOLIC PANEL, BASIC; Future  -     LIPID PANEL; Future  3. Hyperthyroidism - per endo. Back on methimazole 4 days weekly. 4. Sarcoidosis - per pulmonary, improved PFTs  5. Osteopenia, unspecified location - per rheumatology. Will repeat DEXA in 2022. Not taking Fosamax. If bone density next year worsened, may consider Prolia  6. Needs flu shot  -     FLU (FLUAD QUAD INFLUENZA VACCINE,QUAD,ADJUVANTED)      Return in about 6 months (around 2022). SUBJECTIVE/OBJECTIVE:  HPI  Patient comes in today for follow up HTN  Dr. Anton BAÑUELOS [facility]  - endocrinology - saw last week on 11/3/21. States he took labs, TSH 0.017 (improved from <0.005). States she is taking methimazole 4 times weekly Mon - Th. She had been off medication previously, restarted in Aug 2021.      Hx pulmonary sarcoid, last flare >30 years ago. saw Dr. Brooklynn Yuan in office in 2021, had CXR and PFTs. Told PFTs better  Given clear to follow up in 1 year.  lungs were stable.       Hx of osteopenia, saw Dr. Sushma Estrada AUG 2021. Reena Santillan has been off fosamax since 2018, had DEXA done 20.  Continued on calcium with vitamin D.  Plan to repeat DEXA in  and if needed, would consider Prolia.     Colonoscopy in  with Dr. Emmanuel Jaimes. Sole Peralta no polyps were found.  Did Cologuard 19 - good for 3 years     Hx HTN, states compliance with medications.  Tries to follow low sodium diet.  Denies chest pains, palpitations, dizziness, dyspnea, tingling in extremities.  Urinating without difficulty     Dr. Hitesh Fox for glaucoma. -2021  Allergies   Allergen Reactions    Lisinopril Swelling and Cough     Lips swell    Pcn [Penicillins] Hives    Hydrochlorothiazide Unknown (comments)     NA down, fatigue , myalgia       Past Medical History:   Diagnosis Date    Arthritis     OA    COPD     d/t sarcoidosis    Hypertension     Hyponatremia     FOLLOWING TREATMENT WITH HCTZ; NOW RESOLVED 12    Osteopenia     Post-menopause     Sarcoidosis     nonsymptomatic       Past Surgical History:   Procedure Laterality Date    BONE MARROW ASPIRATE &BIOPSY      SARCOIDOSIS DIAGNOSED    HX DILATION AND CURETTAGE      HX HIP REPLACEMENT  2012    right    HX HIP REPLACEMENT Left 2016    HX ORTHOPAEDIC      excision right wrist cyst    HX OTHER SURGICAL      BRONCHOSCOPY    HX TUBAL LIGATION         Social History     Socioeconomic History    Marital status:      Spouse name: Not on file    Number of children: Not on file    Years of education: Not on file    Highest education level: Not on file   Occupational History    Not on file   Tobacco Use    Smoking status: Former Smoker     Packs/day: 0.50     Years: 30.00     Pack years: 15.00     Quit date: 2012     Years since quittin.5    Smokeless tobacco: Never Used   Vaping Use    Vaping Use: Never used   Substance and Sexual Activity    Alcohol use: Yes     Alcohol/week: 28.0 standard drinks     Types: 28 Cans of beer per week     Comment: 3-4 beers daily, may have 6 beers on weekends    Drug use: No    Sexual activity: Never   Other Topics Concern    Not on file   Social History Narrative    Retired from [employer]. .   3 children, 2 sons, 1 daughter     Social Determinants of Health     Financial Resource Strain:     Difficulty of Paying Living Expenses: Not on file   Food Insecurity:     Worried About Running Out of Food in the Last Year: Not on file    Lolly of Food in the Last Year: Not on file   Transportation Needs:     Lack of Transportation (Medical): Not on file    Lack of Transportation (Non-Medical): Not on file   Physical Activity:     Days of Exercise per Week: Not on file    Minutes of Exercise per Session: Not on file   Stress:     Feeling of Stress : Not on file   Social Connections:     Frequency of Communication with Friends and Family: Not on file    Frequency of Social Gatherings with Friends and Family: Not on file    Attends Jew Services: Not on file    Active Member of [address] [place] [address] [employer] or Organizations: Not on file    Attends Club or Organization Meetings: Not on file    Marital Status: Not on file   Intimate Partner Violence:     Fear of Current or Ex-Partner: Not on file    Emotionally Abused: Not on file    Physically Abused: Not on file    Sexually Abused: Not on file   Housing Stability:     Unable to Pay for Housing in the Last Year: Not on file    Number of [place] in the Last Year: Not on file    Unstable Housing in the Last Year: Not on file       Family History   Problem Relation Age of Onset   [employer] Arthritis-rheumatoid Mother     Hypertension Mother     Elevated Lipids Mother     Cancer Mother         bladder    Stroke Mother     Elevated Lipids Sister     Hypertension Sister     Diabetes Sister     Elevated Lipids Sister     Hypertension Sister     Other Son         SARCOIDOSIS, KERATOCONUS    Other Daughter         KERATOCONUS    Anesth Problems Neg Hx        Current Outpatient Medications   Medication Sig    candesartan (ATACAND) 16 mg tablet Take 1 Tab by mouth daily.  atenoloL (TENORMIN) 50 mg tablet TAKE 1 TABLET BY MOUTH DAILY    amLODIPine (NORVASC) 5 mg tablet TAKE 1 TABLET BY MOUTH DAILY    methIMAzole (TAPAZOLE) 10 mg tablet Take 1 Tab by mouth daily. (Patient taking differently: Take 10 mg by mouth. Mon - Thursday)    latanoprost (XALATAN) 0.005 % ophthalmic solution Apply 1 Drop to eye nightly.     fexofenadine (ALLEGRA) 180 mg tablet Take 1 Tab by mouth daily as needed for Itching.  multivitamin (ONE A DAY) tablet Take 1 Tab by mouth daily.  calcium carbonate-vitamin D3 1,000 mg(2,500 mg)-800 unit Tab Take 1 Tab by mouth daily. No current facility-administered medications for this visit. Review of Systems   Constitutional: Negative for chills, diaphoresis, fatigue, fever and unexpected weight change. Respiratory: Negative for cough and shortness of breath. Cardiovascular: Negative for chest pain, palpitations and leg swelling. Gastrointestinal: Negative for abdominal pain, blood in stool, constipation, diarrhea, nausea and vomiting. Endocrine: Negative for cold intolerance and heat intolerance. Genitourinary: Negative for dysuria, flank pain, frequency, hematuria and urgency. Musculoskeletal: Negative for back pain and myalgias. Skin: Negative. Neurological: Negative for dizziness, speech difficulty, light-headedness, numbness and headaches. Psychiatric/Behavioral: Negative for dysphoric mood and sleep disturbance. The patient is not nervous/anxious. Vitals:    11/19/21 1035   BP: 123/74   Pulse: 63   Resp: 12   Temp: 97.7 °F (36.5 °C)   TempSrc: Oral   SpO2: 100%   Weight: 146 lb 12.8 oz (66.6 kg)   Height: 5' 3\" (1.6 m)     Physical Exam  Vitals reviewed. Constitutional:       Appearance: Normal appearance. She is well-developed, well-groomed and normal weight. HENT:      Right Ear: Hearing normal.      Left Ear: Hearing normal.   Neck:      Thyroid: No thyromegaly. Cardiovascular:      Rate and Rhythm: Normal rate and regular rhythm. Pulses:           Dorsalis pedis pulses are 2+ on the right side and 2+ on the left side. Heart sounds: Normal heart sounds, S1 normal and S2 normal.   Pulmonary:      Effort: Pulmonary effort is normal.      Breath sounds: Normal breath sounds.    Abdominal:      General: Bowel sounds are normal.      Palpations: Abdomen is soft. Tenderness: There is no abdominal tenderness. Musculoskeletal:      Right lower leg: No edema. Left lower leg: No edema. Lymphadenopathy:      Cervical: No cervical adenopathy. Skin:     General: Skin is warm and dry. Neurological:      Mental Status: She is alert and oriented to person, place, and time. Psychiatric:         Attention and Perception: Attention normal.         Mood and Affect: Mood and affect normal.         Speech: Speech normal.         Behavior: Behavior normal. Behavior is cooperative. Thought Content: Thought content normal.         Cognition and Memory: Cognition and memory normal.       On this date 11/19/2021 I have spent 30 minutes reviewing previous notes, test results and face to face with the patient discussing the diagnosis and importance of compliance with the treatment plan as well as documenting on the day of the visit. An electronic signature was used to authenticate this note. -- Leonel James NP       This is the Subsequent Medicare Annual Wellness Exam, performed 12 months or more after the Initial AWV or the last Subsequent AWV    I have reviewed the patient's medical history in detail and updated the computerized patient record. Assessment/Plan   Education and counseling provided:  Are appropriate based on today's review and evaluation  Pneumococcal Vaccine  Influenza Vaccine  Screening Mammography  Colorectal cancer screening tests  Cardiovascular screening blood test  Bone mass measurement (DEXA)  Screening for glaucoma  Diabetes screening test    1. Medicare annual wellness visit, subsequent  2. Essential hypertension  -     CBC W/O DIFF; Future  -     METABOLIC PANEL, BASIC; Future  -     LIPID PANEL; Future  3. Hyperthyroidism  4. Sarcoidosis  5. Osteopenia, unspecified location  6.  Needs flu shot  -     FLU (FLUAD QUAD INFLUENZA VACCINE,QUAD,ADJUVANTED)       Depression Risk Factor Screening     3 most recent PHQ Screens 8/12/2021   Little interest or pleasure in doing things Not at all   Feeling down, depressed, irritable, or hopeless Not at all   Total Score PHQ 2 0       Alcohol Risk Screen    Do you average more than 1 drink per night or more than 7 drinks a week:  No    On any one occasion in the past three months have you have had more than 3 drinks containing alcohol:  No        Functional Ability and Level of Safety    Hearing: Hearing is good. Activities of Daily Living: The home contains: no safety equipment. Patient does total self care      Ambulation: with no difficulty     Fall Risk:  Fall Risk Assessment, last 12 mths 11/19/2021   Able to walk? Yes   Fall in past 12 months? 0   Do you feel unsteady?  0   Are you worried about falling 0      Abuse Screen:  Patient is not abused       Cognitive Screening    Has your family/caregiver stated any concerns about your memory: no     Cognitive Screening: Normal - Mini Cog Test    Health Maintenance Due     Health Maintenance Due   Topic Date Due    Shingrix Vaccine Age 49> (1 of 2) Never done    Flu Vaccine (1) 09/01/2021    COVID-19 Vaccine (3 - Booster for Moderna series) 09/26/2021       Patient Care Team   Patient Care Team:  Leora Veliz NP as PCP - General (Nurse Practitioner)  Leora Veliz NP as PCP - REHABILITATION [facility] Empaneled Provider    History     Patient Active Problem List   Diagnosis Code    HTN (hypertension) I10    Hyponatremia E87.1    Osteoarthritis, knee M17.10    Sarcoidosis,lung D86.9    Osteopenia M85.80    Primary osteoarthritis of left hip M16.12    Degenerative joint disease (DJD) of hip M16.9    Abnormal findings on diagnostic imaging of other specified body structures  R93.89     Past Medical History:   Diagnosis Date    Arthritis     OA    COPD     d/t sarcoidosis    Hypertension     Hyponatremia 2009    FOLLOWING TREATMENT WITH HCTZ; NOW RESOLVED 9/24/12    Osteopenia     Post-menopause     Sarcoidosis     nonsymptomatic Past Surgical History:   Procedure Laterality Date    BONE MARROW ASPIRATE &BIOPSY      SARCOIDOSIS DIAGNOSED    HX DILATION AND CURETTAGE      HX HIP REPLACEMENT  2012    right    HX HIP REPLACEMENT Left 2016    HX ORTHOPAEDIC      excision right wrist cyst    HX OTHER SURGICAL      BRONCHOSCOPY    HX TUBAL LIGATION       Current Outpatient Medications   Medication Sig Dispense Refill    candesartan (ATACAND) 16 mg tablet Take 1 Tab by mouth daily. 90 Tab 3    atenoloL (TENORMIN) 50 mg tablet TAKE 1 TABLET BY MOUTH DAILY 90 Tab 3    amLODIPine (NORVASC) 5 mg tablet TAKE 1 TABLET BY MOUTH DAILY 90 Tab 3    methIMAzole (TAPAZOLE) 10 mg tablet Take 1 Tab by mouth daily. (Patient taking differently: Take 10 mg by mouth. Mon - Thursday) 30 Tab 1    latanoprost (XALATAN) 0.005 % ophthalmic solution Apply 1 Drop to eye nightly. 4    fexofenadine (ALLEGRA) 180 mg tablet Take 1 Tab by mouth daily as needed for Itching. 30 Tab 1    multivitamin (ONE A DAY) tablet Take 1 Tab by mouth daily.  calcium carbonate-vitamin D3 1,000 mg(2,500 mg)-800 unit Tab Take 1 Tab by mouth daily.        Allergies   Allergen Reactions    Lisinopril Swelling and Cough     Lips swell    Pcn [Penicillins] Hives    Hydrochlorothiazide Unknown (comments)     NA down, fatigue , myalgia       Family History   Problem Relation Age of Onset   Megan Juarezum Arthritis-rheumatoid Mother     Hypertension Mother     Elevated Lipids Mother     Cancer Mother         bladder    Stroke Mother     Elevated Lipids Sister     Hypertension Sister     Diabetes Sister     Elevated Lipids Sister     Hypertension Sister     Other Son         SARCOIDOSIS, KERATOCONUS    Other Daughter         KERATOCONUS    Anesth Problems Neg Hx      Social History     Tobacco Use    Smoking status: Former Smoker     Packs/day: 0.50     Years: 30.00     Pack years: 15.00     Quit date: 2012     Years since quittin.5    Smokeless tobacco: Never Used   Substance Use Topics    Alcohol use:  Yes     Alcohol/week: 28.0 standard drinks     Types: 28 Cans of beer per week     Comment: 3-4 beers daily, may have 6 beers on weekends         Gema Green NP

## 2021-11-19 NOTE — PATIENT INSTRUCTIONS
Vaccine Information Statement    Influenza (Flu) Vaccine (Inactivated or Recombinant): What You Need to Know    Many vaccine information statements are available in [place] and other languages. See www.immunize.org/vis. Hojas de información sobre vacunas están disponibles en español y en muchos otros idiomas. Visite www.immunize.org/vis. 1. Why get vaccinated? Influenza vaccine can prevent influenza (flu). Flu is a contagious disease that spreads around the [facility] every year, usually between October and May. Anyone can get the flu, but it is more dangerous for some people. Infants and young children, people 72 years and older, pregnant people, and people with certain health conditions or a weakened immune system are at greatest risk of flu complications. Pneumonia, bronchitis, sinus infections, and ear infections are examples of flu-related complications. If you have a medical condition, such as heart disease, cancer, or diabetes, flu can make it worse. Flu can cause fever and chills, sore throat, muscle aches, fatigue, cough, headache, and runny or stuffy nose. Some people may have vomiting and diarrhea, though this is more common in children than adults. In an average year, thousands of people in the [facility] die from flu, and many more are hospitalized. Flu vaccine prevents millions of illnesses and flu-related visits to the doctor each year. 2. Influenza vaccines     CDC recommends everyone 6 months and older get vaccinated every flu season. Children 6 months through 6years of age may need 2 doses during a single flu season. Everyone else needs only 1 dose each flu season. It takes about 2 weeks for protection to develop after vaccination. There are many flu viruses, and they are always changing. Each year a new flu vaccine is made to protect against the influenza viruses believed to be likely to cause disease in the upcoming flu season.  Even when the vaccine doesnt exactly match these viruses, it may still provide some protection. Influenza vaccine does not cause flu. Influenza vaccine may be given at the same time as other vaccines. 3. Talk with your health care provider    Tell your vaccination provider if the person getting the vaccine:   Has had an allergic reaction after a previous dose of influenza vaccine, or has any severe, life-threatening allergies    Has ever had Guillain-Barré Syndrome (also called GBS)    In some cases, your health care provider may decide to postpone influenza vaccination until a future visit. Influenza vaccine can be administered at any time during pregnancy. People who are or will be pregnant during influenza season should receive inactivated influenza vaccine. People with minor illnesses, such as a cold, may be vaccinated. People who are moderately or severely ill should usually wait until they recover before getting influenza vaccine. Your health care provider can give you more information. 4. Risks of a vaccine reaction     Soreness, redness, and swelling where the shot is given, fever, muscle aches, and headache can happen after influenza vaccination.  There may be a very small increased risk of Guillain-Barré Syndrome (GBS) after inactivated influenza vaccine (the flu shot). [facility] children who get the flu shot along with pneumococcal vaccine (PCV13) and/or DTaP vaccine at the same time might be slightly more likely to have a seizure caused by fever. Tell your health care provider if a child who is getting flu vaccine has ever had a seizure. People sometimes faint after medical procedures, including vaccination. Tell your provider if you feel dizzy or have vision changes or ringing in the ears. As with any medicine, there is a very remote chance of a vaccine causing a severe allergic reaction, other serious injury, or death. 5. What if there is a serious problem?     An allergic reaction could occur after the vaccinated person leaves the clinic. If you see signs of a severe allergic reaction (hives, swelling of the face and throat, difficulty breathing, a fast heartbeat, dizziness, or weakness), call 9-1-1 and get the person to the nearest hospital.    For other signs that concern you, call your health care provider. Adverse reactions should be reported to the Vaccine Adverse Event Reporting System (VAERS). Your health care provider will usually file this report, or you can do it yourself. Visit the VAERS website at www.vaers. [facility].gov or call 4-513.575.7518. VAERS is only for reporting reactions, and VAERS staff members do not give medical advice. 6. The National Vaccine Injury Compensation Program    The [facility] Vaccine Injury Compensation Program (VICP) is a federal program that was created to compensate people who may have been injured by certain vaccines. Claims regarding alleged injury or death due to vaccination have a time limit for filing, which may be as short as two years. Visit the VICP website at www.[facility].gov/vaccinecompensation or call 6-526.236.3719 to learn about the program and about filing a claim. 7. How can I learn more?  Ask your health care provider.  Call your local or state health department.  Visit the website of the Food and Drug Administration (FDA) for vaccine package inserts and additional information at www.fda.gov/vaccines-blood-biologics/vaccines.  Contact the Centers for Disease Control and Prevention (CDC):  - Call 7-457.540.5126 (6-199-JVI-INFO) or  - Visit CDCs influenza website at www.cdc.gov/flu. Vaccine Information Statement   Inactivated Influenza Vaccine   8/6/2021  42 LYNN Cabrera [id number]   Department of Health and Human Services  Centers for Disease Control and Prevention    Office Use Only      Medicare Wellness Visit, Female     The best way to live healthy is to have a lifestyle where you eat a well-balanced diet, exercise regularly, limit alcohol use, and quit all forms of tobacco/nicotine, if applicable. Regular preventive services are another way to keep healthy. Preventive services (vaccines, screening tests, monitoring & exams) can help personalize your care plan, which helps you manage your own care. Screening tests can find health problems at the earliest stages, when they are easiest to treat. [facility] follows the current, evidence-based guidelines published by the [facility] Alvaro ([facility]) when recommending preventive services for our patients. Because we follow these guidelines, sometimes recommendations change over time as research supports it. (For example, mammograms used to be recommended annually. Even though Medicare will still pay for an annual mammogram, the newer guidelines recommend a mammogram every two years for women of average risk). Of course, you and your doctor may decide to screen more often for some diseases, based on your risk and your co-morbidities (chronic disease you are already diagnosed with). Preventive services for you include:  - Medicare offers their members a free annual wellness visit, which is time for you and your primary care provider to discuss and plan for your preventive service needs. Take advantage of this benefit every year!  -All adults over the age of 72 should receive the recommended pneumonia vaccines. Current USPSTF guidelines recommend a series of two vaccines for the best pneumonia protection.   -All adults should have a flu vaccine yearly and a tetanus vaccine every 10 years.   -All adults age 48 and older should receive the shingles vaccines (series of two vaccines).       -All adults age 38-68 who are overweight should have a diabetes screening test once every three years.   -All adults born between [birth year] and 1965 should be screened once for Hepatitis C.  -Other screening tests and preventive services for persons with diabetes include: an eye exam to screen for diabetic retinopathy, a kidney function test, a foot exam, and stricter control over your cholesterol.   -Cardiovascular screening for adults with routine risk involves an electrocardiogram (ECG) at intervals determined by your doctor.   -Colorectal cancer screenings should be done for adults age 54-65 with no increased risk factors for colorectal cancer. There are a number of acceptable methods of screening for this type of cancer. Each test has its own benefits and drawbacks. Discuss with your doctor what is most appropriate for you during your annual wellness visit. The different tests include: colonoscopy (considered the best screening method), a fecal occult blood test, a fecal DNA test, and sigmoidoscopy.    -A bone mass density test is recommended when a woman turns 65 to screen for osteoporosis. This test is only recommended one time, as a screening. Some providers will use this same test as a disease monitoring tool if you already have osteoporosis. -Breast cancer screenings are recommended every other year for women of normal risk, age 54-69.  -Cervical cancer screenings for women over age 72 are only recommended with certain risk factors.      Here is a list of your current Health Maintenance items (your personalized list of preventive services) with a due date:  Health Maintenance Due   Topic Date Due    Shingles Vaccine (1 of 2) Never done    COVID-19 Vaccine (3 - Booster for Annie Garland series) 09/26/2021

## 2021-11-19 NOTE — PROGRESS NOTES
Chief Complaint   Patient presents with    Hypertension    Thyroid Problem       1. Have you been to the ER, urgent care clinic since your last visit? Hospitalized since your last visit? No    2. Have you seen or consulted any other health care providers outside of the [address] [place] [address] since your last visit? Include any pap smears or colon screening. Yes,   Dr. Ronald Ferguson for glaucoma. Dr. Sergio Lambert for thyroid     Flu shot - Pt accepts   Verbal Order with Readback given by Chiquita Vallejo NP for Influenza. Given in Left Deltoid without difficulty.      Health Maintenance Due   Topic Date Due    Shingrix Vaccine Age 49> (1 of 2) Never done    Flu Vaccine (1) 09/01/2021    COVID-19 Vaccine (3 - Booster for Moderna series) 09/26/2021    Medicare Yearly Exam  11/19/2021

## 2022-02-16 DIAGNOSIS — I10 ESSENTIAL HYPERTENSION: ICD-10-CM

## 2022-02-16 RX ORDER — ATENOLOL 50 MG/1
TABLET ORAL
Qty: 90 TABLET | Refills: 3 | Status: SHIPPED | OUTPATIENT
Start: 2022-02-16

## 2022-02-16 RX ORDER — CANDESARTAN 16 MG/1
TABLET ORAL
Qty: 90 TABLET | Refills: 3 | Status: SHIPPED | OUTPATIENT
Start: 2022-02-16

## 2022-02-16 RX ORDER — AMLODIPINE BESYLATE 5 MG/1
TABLET ORAL
Qty: 90 TABLET | Refills: 3 | Status: SHIPPED | OUTPATIENT
Start: 2022-02-16

## 2022-03-19 PROBLEM — R93.89 ABNORMAL FINDINGS ON DIAGNOSTIC IMAGING OF OTHER SPECIFIED BODY STRUCTURES: Status: ACTIVE | Noted: 2020-08-13

## 2022-04-29 ENCOUNTER — TELEPHONE (OUTPATIENT)
Dept: RHEUMATOLOGY | Age: 72
End: 2022-04-29

## 2022-04-29 NOTE — TELEPHONE ENCOUNTER
Called pt to cancel and reschedule appt due to [facility] no longer practicing. Left vm to have pt call back to reschedule. Canceled appt.

## 2022-06-14 ENCOUNTER — OFFICE VISIT (OUTPATIENT)
Dept: FAMILY MEDICINE CLINIC | Age: 72
End: 2022-06-14
Payer: MEDICARE

## 2022-06-14 VITALS
OXYGEN SATURATION: 99 % | TEMPERATURE: 98.1 F | BODY MASS INDEX: 25.69 KG/M2 | RESPIRATION RATE: 16 BRPM | HEART RATE: 63 BPM | DIASTOLIC BLOOD PRESSURE: 63 MMHG | HEIGHT: 63 IN | SYSTOLIC BLOOD PRESSURE: 110 MMHG | WEIGHT: 145 LBS

## 2022-06-14 DIAGNOSIS — I10 ESSENTIAL HYPERTENSION: Primary | ICD-10-CM

## 2022-06-14 DIAGNOSIS — D86.9 SARCOIDOSIS: ICD-10-CM

## 2022-06-14 DIAGNOSIS — E05.90 HYPERTHYROIDISM: ICD-10-CM

## 2022-06-14 DIAGNOSIS — M85.80 OSTEOPENIA, UNSPECIFIED LOCATION: ICD-10-CM

## 2022-06-14 DIAGNOSIS — Z12.31 ENCOUNTER FOR SCREENING MAMMOGRAM FOR MALIGNANT NEOPLASM OF BREAST: ICD-10-CM

## 2022-06-14 DIAGNOSIS — Z12.11 SCREEN FOR COLON CANCER: ICD-10-CM

## 2022-06-14 PROCEDURE — G8536 NO DOC ELDER MAL SCRN: HCPCS | Performed by: NURSE PRACTITIONER

## 2022-06-14 PROCEDURE — G8417 CALC BMI ABV UP PARAM F/U: HCPCS | Performed by: NURSE PRACTITIONER

## 2022-06-14 PROCEDURE — G8754 DIAS BP LESS 90: HCPCS | Performed by: NURSE PRACTITIONER

## 2022-06-14 PROCEDURE — G0463 HOSPITAL OUTPT CLINIC VISIT: HCPCS | Performed by: NURSE PRACTITIONER

## 2022-06-14 PROCEDURE — 99213 OFFICE O/P EST LOW 20 MIN: CPT | Performed by: NURSE PRACTITIONER

## 2022-06-14 PROCEDURE — G8752 SYS BP LESS 140: HCPCS | Performed by: NURSE PRACTITIONER

## 2022-06-14 PROCEDURE — 1101F PT FALLS ASSESS-DOCD LE1/YR: CPT | Performed by: NURSE PRACTITIONER

## 2022-06-14 PROCEDURE — G9899 SCRN MAM PERF RSLTS DOC: HCPCS | Performed by: NURSE PRACTITIONER

## 2022-06-14 PROCEDURE — 3017F COLORECTAL CA SCREEN DOC REV: CPT | Performed by: NURSE PRACTITIONER

## 2022-06-14 PROCEDURE — G8399 PT W/DXA RESULTS DOCUMENT: HCPCS | Performed by: NURSE PRACTITIONER

## 2022-06-14 PROCEDURE — 1123F ACP DISCUSS/DSCN MKR DOCD: CPT | Performed by: NURSE PRACTITIONER

## 2022-06-14 PROCEDURE — G8432 DEP SCR NOT DOC, RNG: HCPCS | Performed by: NURSE PRACTITIONER

## 2022-06-14 PROCEDURE — G8427 DOCREV CUR MEDS BY ELIG CLIN: HCPCS | Performed by: NURSE PRACTITIONER

## 2022-06-14 PROCEDURE — 1090F PRES/ABSN URINE INCON ASSESS: CPT | Performed by: NURSE PRACTITIONER

## 2022-06-14 NOTE — PROGRESS NOTES
Chief Complaint   Patient presents with    Hypertension     6m fu     Thyroid Problem     6m fu        1. \"Have you been to the ER, urgent care clinic since your last visit? Hospitalized since your last visit? \" No    2. \"Have you seen or consulted any other health care providers outside of the [address] [place] [address] since your last visit? \" No     3. For patients aged 39-70: Has the patient had a colonoscopy / FIT/ Cologuard? No - cologuard in 2019 approx    If the patient is female:    4. For patients aged 41-77: Has the patient had a mammogram within the past 2 years? Yes - Care Gap present. Most recent result on file  thy    5. For patients aged 21-65: Has the patient had a pap smear? Yes - Care Gap present.  Most recent result on file    Health Maintenance Due   Topic Date Due    Shingrix Vaccine Age 49> (1 of 2) Never done    COVID-19 Vaccine (3 - Booster for Moderna series) 08/26/2021    Colorectal Cancer Screening Combo  06/22/2022

## 2022-06-14 NOTE — PROGRESS NOTES
Citlaly Leonard (: 1950) is a 73 y.o. female, established patient, here for evaluation of the following chief complaint(s):  Hypertension (6m fu ) and Thyroid Problem (6m fu )       ASSESSMENT/PLAN:  Below is the assessment and plan developed based on review of pertinent history, physical exam, labs, studies, and medications. 1. Essential hypertension - BP controlled today, continue amlodipine 5mg and atenolol 50mg  2. Hyperthyroidism - followed by endo, taking methimazole 10mg daily  3. Sarcoidosis - followed yearly by pulmonary, stable  4. Osteopenia, unspecified location - followed by rheumatology, appt in Oct 2022  5. Encounter for screening mammogram for malignant neoplasm of breast  -     [facility] 3D IVETH W MAMMO BI SCREENING INCL CAD; Future  6. Screen for colon cancer  -     COLOGUARD TEST (FECAL DNA COLORECTAL CANCER SCREENING); Future      Return in about 6 months (around 2022). SUBJECTIVE/OBJECTIVE:  HPI   Patient comes in today for follow up HTN  Dr. Mya Davidson  - endocrinology -[facility] in 2022. Stopped medication for 3 weeks, restarted medication on May 10th taking methimazole 10mg daily.        Hx pulmonary sarcoid, last flare >30 years ago. saw Dr. Saul Morse in office in 2021, had CXR and PFTs. Told PFTs better  Given clear to follow up in 1 year.  lungs were stable.       Hx of osteopenia, saw Dr. Shira Farias AUG 2021. Donovan Escamilla has been off fosamax since 2018, had DEXA done 20.  Continued on calcium with vitamin D.  Plan to repeat DEXA in  and if needed, would consider Prolia. Has appt with Dr. Colleen Velez in Oct 2022 since Dr. Doneen Sandifer left office     Colonoscopy in  with Dr. Matt Dyer. Kristin Hernandez no polyps were found.  Did Cologuard 19 - gdue for repeat cologuard.   Declines colonoscopy     Hx HTN, states compliance with medications.  Tries to follow low sodium diet.  Denies chest pains, palpitations, dizziness, dyspnea, tingling in extremities.  Urinating without difficulty     Dr. Kenneth Mckay for glaucoma. -sees every 4 months. States visit normal   Allergies   Allergen Reactions    Lisinopril Swelling and Cough     Lips swell    Pcn [Penicillins] Hives    Hydrochlorothiazide Unknown (comments)     NA down, fatigue , myalgia       Past Medical History:   Diagnosis Date    Arthritis     OA    COPD     d/t sarcoidosis    Hypertension     Hyponatremia 2009    FOLLOWING TREATMENT WITH HCTZ; NOW RESOLVED 9/24/12    Osteopenia     Post-menopause     Sarcoidosis     nonsymptomatic       Past Surgical History:   Procedure Laterality Date    BONE MARROW ASPIRATE &BIOPSY  1979    SARCOIDOSIS DIAGNOSED    HX DILATION AND CURETTAGE  1992    HX HIP REPLACEMENT  2012    right    HX HIP REPLACEMENT Left 08/26/2016    HX ORTHOPAEDIC      excision right wrist cyst    HX OTHER SURGICAL  1979    BRONCHOSCOPY    HX TUBAL LIGATION  1988       Social History     Socioeconomic History    Marital status:      Spouse name: Not on file    Number of children: Not on file    Years of education: Not on file    Highest education level: Not on file   Occupational History    Not on file   Tobacco Use    Smoking status: Former Smoker     Packs/day: 0.50     Years: 30.00     Pack years: 15.00     Quit date: 5/4/2012     Years since quitting: 10.1    Smokeless tobacco: Never Used   Vaping Use    Vaping Use: Never used   Substance and Sexual Activity    Alcohol use: Yes     Alcohol/week: 28.0 standard drinks     Types: 28 Cans of beer per week     Comment: 3-4 beers daily, may have 6 beers on weekends    Drug use: No    Sexual activity: Never   Other Topics Concern    Not on file   Social History Narrative    Retired from [employer] .   3 children, 2 sons, 1 daughter     Social Determinants of Health     Financial Resource Strain:     Difficulty of Paying Living Expenses: Not on file   Food Insecurity:     Worried About Running Out of Food in the Last Year: Not on file    Ran Out of Food in the Last Year: Not on file   Transportation Needs:     Lack of Transportation (Medical): Not on file    Lack of Transportation (Non-Medical): Not on file   Physical Activity:     Days of Exercise per Week: Not on file    Minutes of Exercise per Session: Not on file   Stress:     Feeling of Stress : Not on file   Social Connections:     Frequency of Communication with Friends and Family: Not on file    Frequency of Social Gatherings with Friends and Family: Not on file    Attends [employer] Services: Not on file    Active Member of Clubs or Organizations: Not on file    Attends Club or Organization Meetings: Not on file    Marital Status: Not on file   Intimate Partner Violence:     Fear of Current or Ex-Partner: Not on file    Emotionally Abused: Not on file    Physically Abused: Not on file    Sexually Abused: Not on file   Housing Stability:     Unable to Pay for Housing in the Last Year: Not on file    Number of Places Lived in the Last Year: Not on file    Unstable Housing in the Last Year: Not on file       Family History   Problem Relation Age of Onset   [employer] Arthritis-rheumatoid Mother     Hypertension Mother     Elevated Lipids Mother     Cancer Mother         bladder    Stroke Mother     Elevated Lipids Sister     Hypertension Sister     Diabetes Sister     Elevated Lipids Sister     Hypertension Sister     Other Son         SARCOIDOSIS, KERATOCONUS    Other Daughter         KERATOCONUS    Anesth Problems Neg Hx        Current Outpatient Medications   Medication Sig    atenoloL (TENORMIN) 50 mg tablet TAKE 1 TABLET BY MOUTH DAILY    amLODIPine (NORVASC) 5 mg tablet TAKE 1 TABLET BY MOUTH DAILY    candesartan (ATACAND) 16 mg tablet TAKE 1 TABLET BY MOUTH DAILY    methIMAzole (TAPAZOLE) 10 mg tablet Take 1 Tab by mouth daily. (Patient taking differently: Take 10 mg by mouth daily.  Mon - Thursday)    latanoprost (XALATAN) 0.005 % ophthalmic solution Apply 1 Drop to eye nightly.  fexofenadine (ALLEGRA) 180 mg tablet Take 1 Tab by mouth daily as needed for Itching.  multivitamin (ONE A DAY) tablet Take 1 Tab by mouth daily.  calcium carbonate-vitamin D3 1,000 mg(2,500 mg)-800 unit Tab Take 1 Tab by mouth daily. No current facility-administered medications for this visit. Review of Systems   Constitutional: Negative for chills, diaphoresis, fatigue, fever and unexpected weight change. Respiratory: Negative for cough and shortness of breath. Cardiovascular: Negative for chest pain, palpitations and leg swelling. Gastrointestinal: Negative for abdominal pain, blood in stool, constipation, diarrhea, nausea and vomiting. Endocrine: Negative for cold intolerance and heat intolerance. Genitourinary: Negative for dysuria, flank pain, frequency, hematuria and urgency. Musculoskeletal: Negative for back pain and myalgias. Skin: Negative. Neurological: Negative for dizziness, speech difficulty, light-headedness, numbness and headaches. Psychiatric/Behavioral: Negative for dysphoric mood and sleep disturbance. The patient is not nervous/anxious. Visit Vitals  /63   Pulse 63   Temp 98.1 °F (36.7 °C) (Oral)   Resp 16   Ht 5' 3\" (1.6 m)   Wt 145 lb (65.8 kg)   SpO2 99%   BMI 25.69 kg/m²     Physical Exam  Vitals reviewed. Constitutional:       Appearance: Normal appearance. She is well-developed, well-groomed and normal weight. HENT:      Right Ear: Hearing normal.      Left Ear: Hearing normal.   Neck:      Thyroid: No thyromegaly. Cardiovascular:      Rate and Rhythm: Normal rate and regular rhythm. Pulses:           Dorsalis pedis pulses are 2+ on the right side and 2+ on the left side. Heart sounds: Normal heart sounds, S1 normal and S2 normal.   Pulmonary:      Effort: Pulmonary effort is normal.      Breath sounds: Normal breath sounds.    Abdominal:      General: Bowel sounds are normal. Palpations: Abdomen is soft. Tenderness: There is no abdominal tenderness. Musculoskeletal:      Right lower leg: No edema. Left lower leg: No edema. Lymphadenopathy:      Cervical: No cervical adenopathy. Skin:     General: Skin is warm and dry. Neurological:      Mental Status: She is alert and oriented to person, place, and time. Psychiatric:         Attention and Perception: Attention normal.         Mood and Affect: Mood and affect normal.         Speech: Speech normal.         Behavior: Behavior normal. Behavior is cooperative. Thought Content: Thought content normal.         Cognition and Memory: Cognition and memory normal.           On this date 06/14/2022 I have spent 25 minutes reviewing previous notes, test results and face to face with the patient discussing the diagnosis and importance of compliance with the treatment plan as well as documenting on the day of the visit. An electronic signature was used to authenticate this note.   -- Alka Maurer NP

## 2022-07-28 ENCOUNTER — PATIENT MESSAGE (OUTPATIENT)
Dept: FAMILY MEDICINE CLINIC | Age: 72
End: 2022-07-28

## 2022-07-28 ENCOUNTER — HOSPITAL ENCOUNTER (OUTPATIENT)
Dept: MAMMOGRAPHY | Age: 72
Discharge: HOME | End: 2022-07-28
Payer: MEDICARE

## 2022-07-28 DIAGNOSIS — R19.5 POSITIVE COLORECTAL CANCER SCREENING USING COLOGUARD TEST: Primary | ICD-10-CM

## 2022-07-28 DIAGNOSIS — Z12.31 ENCOUNTER FOR SCREENING MAMMOGRAM FOR MALIGNANT NEOPLASM OF BREAST: ICD-10-CM

## 2022-07-28 PROCEDURE — 77063 BREAST TOMOSYNTHESIS BI: CPT

## 2022-07-29 NOTE — TELEPHONE ENCOUNTER
Amy Gonzalez 7/29/2022 10:35 AM EDT      ----- Message -----  From: Werner Robles  Sent: 7/28/2022 8:52 PM EDT  To: [facility] Nurse Pool  Subject: Cologuard test results     Hi, I did the cologuard test back in June, I still dont have my results. I would just like to know if I ,m ok or not. No results on my chart yet .

## 2022-08-23 ENCOUNTER — TRANSCRIBE ORDER (OUTPATIENT)
Dept: REGISTRATION | Age: 72
End: 2022-08-23

## 2022-08-23 ENCOUNTER — HOSPITAL ENCOUNTER (OUTPATIENT)
Dept: GENERAL RADIOLOGY | Age: 72
Discharge: HOME | End: 2022-08-23
Payer: MEDICARE

## 2022-08-23 DIAGNOSIS — D86.9 SARCOIDOSIS: ICD-10-CM

## 2022-08-23 DIAGNOSIS — D86.9 SARCOIDOSIS: Primary | ICD-10-CM

## 2022-08-23 PROCEDURE — 71046 X-RAY EXAM CHEST 2 VIEWS: CPT

## 2022-10-11 ENCOUNTER — OFFICE VISIT (OUTPATIENT)
Dept: RHEUMATOLOGY | Age: 72
End: 2022-10-11
Payer: MEDICARE

## 2022-10-11 VITALS
BODY MASS INDEX: 26.04 KG/M2 | RESPIRATION RATE: 16 BRPM | WEIGHT: 147 LBS | SYSTOLIC BLOOD PRESSURE: 138 MMHG | HEART RATE: 80 BPM | DIASTOLIC BLOOD PRESSURE: 84 MMHG | OXYGEN SATURATION: 95 % | TEMPERATURE: 98.5 F

## 2022-10-11 DIAGNOSIS — M81.0 POST-MENOPAUSAL OSTEOPOROSIS: Primary | ICD-10-CM

## 2022-10-11 PROCEDURE — 1101F PT FALLS ASSESS-DOCD LE1/YR: CPT | Performed by: PEDIATRICS

## 2022-10-11 PROCEDURE — G8536 NO DOC ELDER MAL SCRN: HCPCS | Performed by: PEDIATRICS

## 2022-10-11 PROCEDURE — G8752 SYS BP LESS 140: HCPCS | Performed by: PEDIATRICS

## 2022-10-11 PROCEDURE — 1123F ACP DISCUSS/DSCN MKR DOCD: CPT | Performed by: PEDIATRICS

## 2022-10-11 PROCEDURE — 3017F COLORECTAL CA SCREEN DOC REV: CPT | Performed by: PEDIATRICS

## 2022-10-11 PROCEDURE — 99215 OFFICE O/P EST HI 40 MIN: CPT | Performed by: PEDIATRICS

## 2022-10-11 PROCEDURE — G8417 CALC BMI ABV UP PARAM F/U: HCPCS | Performed by: PEDIATRICS

## 2022-10-11 PROCEDURE — G8754 DIAS BP LESS 90: HCPCS | Performed by: PEDIATRICS

## 2022-10-11 PROCEDURE — G0463 HOSPITAL OUTPT CLINIC VISIT: HCPCS | Performed by: PEDIATRICS

## 2022-10-11 PROCEDURE — G9899 SCRN MAM PERF RSLTS DOC: HCPCS | Performed by: PEDIATRICS

## 2022-10-11 PROCEDURE — G8510 SCR DEP NEG, NO PLAN REQD: HCPCS | Performed by: PEDIATRICS

## 2022-10-11 PROCEDURE — 1090F PRES/ABSN URINE INCON ASSESS: CPT | Performed by: PEDIATRICS

## 2022-10-11 PROCEDURE — G8427 DOCREV CUR MEDS BY ELIG CLIN: HCPCS | Performed by: PEDIATRICS

## 2022-10-11 NOTE — PROGRESS NOTES
Chief Complaint   Patient presents with    Joint Pain     1. Have you been to the ER, urgent care clinic since your last visit? Hospitalized since your last visit? No    2. Have you seen or consulted any other health care providers outside of the [address] [place] [address] since your last visit? Include any pap smears or colon screening.  No

## 2022-10-11 NOTE — PROGRESS NOTES
RHEUMATOLOGY PROBLEM LIST AND CHIEF COMPLAINT  1. Post-Menopausal with Abnormal Bone Density      Osteoporosis Historical Synopsis    Height loss since age 29 (at least two inches): 1  Fracture history includes: no  Family history of hip fracture: no  Fall Risk: no    Daily calcium intake is 1200 mg  Daily vitamin D intake is 800 IU    Smoking history: yes (quit 2012)  Alcohol consumption: yes (three to four - 12 ounce beers about 4 days per week)  Prednisone history: yes (history of chronic daily prednisone [id number] for sarcoidosis of lung)    Exercise: yes    Previous work-up for osteoporosis includes the following:  DEXA Scan: 7/20/2020  Vitamin 25OH D level: 41.2 (10/22/2020)  PTH: 37 (10/22/2020  TSH: 0.006 (10/22/2020)    Therapy History includes:  Current osteoporosis therapy includes: none  Prior osteoporosis therapy includes: alendronate 70 mg weekly (6/2013 to 6/06/2018; 5 years)  The following osteoporosis therapy have been ineffective: non  The following osteoporosis therapy were stopped because of side effects: none    INTERVAL HISTORY  This is a 73 y.o.  female. Today, the patient complains of pain in the joints. Location: none  Severity:  0 on a scale of 0-10  Timing: none  Duration: none  Modifying factors:   Context/Associated signs and symptoms: The patient was previously followed by Dr. Columba Butcher and is now switching over to my care. She last saw Dr. Columba Butcher in office on 8/12/21. Previous medical history, lab work, and notes in chart were reviewed. She was previously diagnosed with osteopenia with Dr. Kristian Galvan and was on alendronate for 5 years. She is not currently taking any medication for her bone density. Her last DEXA scan on 7/20/2020 showed osteopenia. She also has a history of sarcoidosis and denies any symptoms. Her chest xray on 8/23/22 showed no significant change in chronic mild scarring.      RHEUMATOLOGY REVIEW OF SYSTEMS   Positives as per history  Negatives as follows:  Lenny Arroyoe:  Denies unexplained persistent fevers, weight change, chronic fatigue  HEAD/EYES:   Denies eye redness, blurry vision or sudden loss of vision, dry eyes, HA, temporal artery pain  ENT:    Denies oral/nasal ulcers, recurrent sinus infections, dry mouth  RESPIRATORY:  No pleuritic pain, history of pleural effusions, hemoptysis, exertional dyspnea  CARDIOVASCULAR: Denies chest pain, history of pericardial effusions  GASTRO:   Denies heartburn, esophageal dysmotility, abdominal pain, nausea, vomiting, diarrhea, blood in the stool  HEMATOLOGIC:  No easy bruising, purpura, swollen lymph nodes  SKIN:    Denies alopecia, ulcers, nodules, sun sensitivity, unexplained persistent rash   VASCULAR:   Denies edema, cyanosis, raynaud phenomenon  NEUROLOGIC:  Denies specific muscle weakness, paresthesias   PSYCHIATRIC:  No sleep disturbance / snoring, depression, anxiety  MSK:    No morning stiffness >1 hour, SI joint pain, persistent joint swelling, persistent joint pain    PAST MEDICAL HISTORY  Reviewed with patient, significant changes in medical history - no     PHYSICAL EXAM  Blood pressure 138/84, pulse 80, temperature 98.5 °F (36.9 °C), temperature source Oral, resp. rate 16, weight 147 lb (66.7 kg), SpO2 95 %. GENERAL APPEARANCE: Well-nourished, no acute distress  EYES: No scleral erythema, conjunctival injection  ENT: No oral ulcer, parotid enlargement  NECK: No adenopathy, thyroid enlargement  CARDIOVASCULAR: Heart rhythm is regular. No murmur, rub, gallop  CHEST: Normal vesicular breath sounds. No wheezes, rales, pleural friction rubs  ABDOMINAL: The abdomen is soft and nontender.  Bowel sounds are normal  EXTREMITIES: There is no evidence of clubbing, cyanosis, edema  SKIN: No rash, palpable purpura, digital ulcer, abnormal thickening, normal nailfold capillaries   NEUROLOGICAL: Normal gait and station, full strength in upper and lower extremities,  normal sensation to light touch  MUSCULOSKELETAL:   Upper extremities - full range of motion, no tenderness, no swelling, no synovial thickening and no deformity of joints except z-deformity on the left and a little bit on the right. Mitch's nodes. Lower extremities - full range of motion, no tenderness, no swelling, no synovial thickening and no deformity of joints except bony prominence bilateral knees. LABS, RADIOLOGY AND PROCEDURES  Previous labs reviewed -Yes  Previous radiology reviewed -Yes  Previous procedures reviewed -Yes  Previous medical records reviewed/summarized -Yes     Imaging    Musculoskeletal Ultrasound    None    Radiographs    None    CT Imaging    None    MR Imaging    None    DXA     DXA 7/20/2020: (excluded s/p bilateral hip replacements) lumbar spine L1-L4 T score 1.0 (BMD 1.319 g/cm2), and distal one third left radius T score -1.3 (BMD 0.760 g/cm2). FRAX score N/A % probability in 10 years for major osteoporotic fracture and N/A % 10 year probability of hip fracture. DXA 7/16/2018: (excluded Both hips for replacement) lumbar spine L1-L4 T score 0.1 (BMD 1.156 g/cm2), distal one third left radius T score -0.3 (BMD 0.664 g/cm2). FRAX score 5.3 % probability in 10 years for major osteoporotic fracture and 0.8 % 10 year probability of hip fracture. I personally reviewed the images of this study. DXA 5/12/2015: (excluded right femoral neck, right total hip) lumbar spine L1-L4 T score -0.1 (BMD 1.137 g/cm2), left femoral neck T score: -2.0 (0.661 g/cm2), left total hip T score: -1.6 (0.784 g/cm2), and distal one third left radius T score -0.2 (BMD 0.684 g/cm2). FRAX score N/A % probability in 10 years for major osteoporotic fracture and N/A % 10 year probability of hip fracture.     DXA 2/01/2013: (excluded right femoral hip, right total hip) lumbar spine L1-L4 T score -1.0 (BMD 1.041 g/cm2), left femoral neck T score: -2.3 (0.624 g/cm2), left total hip T score: -1.7 (0.773 g/cm2), and distal one third left radius T score -0.1 (BMD 0.688 g/cm2). FRAX score 4.3 % probability in 10 years for major osteoporotic fracture and 0.9 % 10 year probability of hip fracture. ASSESSMENT  1. Post-Menopausal with Abnormal Bone Density - We will repeat her DEXA scan and if this shows osteopenia recommend she continue with just calcium and vitamin d supplements. She has had no fractures or new risk factors. PLAN  1. DEXA scan   2. Calcium and vitamin d supplements  3. Follow up based on DEXA scan results     Willa Saba MD  Adult and Pediatric Rheumatology     [facility], [address] [place] [address], Phone [phone], Fax [phone]     Visiting  of Pediatrics    Department of Pediatrics, [facility] of [address] [place] [address], [address] [place] [address], Phone [phone], Fax [phone]    There are no Patient Instructions on file for this visit. cc:  Shawanda Alas NP    Written by cyrus Barbosa, as dictated by Dr. Riki Spann M.D. Total time was 40 minutes, greater than 50% of which was spent in counseling and coordination of care. The diagnosis, treatment and various other items were discussed in detail: Test results, medication options, possible side effects, lifestyle changes.

## 2022-11-11 ENCOUNTER — HOSPITAL ENCOUNTER (OUTPATIENT)
Dept: BONE DENSITY | Age: 72
Discharge: HOME | End: 2022-11-11
Attending: PEDIATRICS
Payer: MEDICARE

## 2022-11-11 DIAGNOSIS — M81.0 POST-MENOPAUSAL OSTEOPOROSIS: ICD-10-CM

## 2022-11-11 PROCEDURE — 77080 DXA BONE DENSITY AXIAL: CPT

## 2022-11-28 RX ORDER — BIMATOPROST 0.1 MG/ML
1 SOLUTION/ DROPS OPHTHALMIC EVERY EVENING
COMMUNITY

## 2022-11-29 ENCOUNTER — ANESTHESIA EVENT (OUTPATIENT)
Dept: ENDOSCOPY | Age: 72
End: 2022-11-29
Payer: MEDICARE

## 2022-11-29 ENCOUNTER — HOSPITAL ENCOUNTER (OUTPATIENT)
Age: 72
Setting detail: OUTPATIENT SURGERY
Discharge: HOME | End: 2022-11-29
Attending: INTERNAL MEDICINE | Admitting: INTERNAL MEDICINE
Payer: MEDICARE

## 2022-11-29 ENCOUNTER — ANESTHESIA (OUTPATIENT)
Dept: ENDOSCOPY | Age: 72
End: 2022-11-29
Payer: MEDICARE

## 2022-11-29 VITALS
RESPIRATION RATE: 18 BRPM | DIASTOLIC BLOOD PRESSURE: 69 MMHG | BODY MASS INDEX: 27.6 KG/M2 | HEART RATE: 55 BPM | OXYGEN SATURATION: 100 % | HEIGHT: 61 IN | SYSTOLIC BLOOD PRESSURE: 109 MMHG | WEIGHT: 146.2 LBS | TEMPERATURE: 98 F

## 2022-11-29 PROCEDURE — 76040000019: Performed by: INTERNAL MEDICINE

## 2022-11-29 PROCEDURE — 2709999900 HC NON-CHARGEABLE SUPPLY: Performed by: INTERNAL MEDICINE

## 2022-11-29 PROCEDURE — 76060000031 HC ANESTHESIA FIRST 0.5 HR: Performed by: INTERNAL MEDICINE

## 2022-11-29 PROCEDURE — 88305 TISSUE EXAM BY PATHOLOGIST: CPT

## 2022-11-29 PROCEDURE — 77030013992 HC SNR POLYP ENDOSC BSC -B: Performed by: INTERNAL MEDICINE

## 2022-11-29 PROCEDURE — 74011250636 HC RX REV CODE- 250/636: Performed by: NURSE ANESTHETIST, CERTIFIED REGISTERED

## 2022-11-29 PROCEDURE — 74011250636 HC RX REV CODE- 250/636: Performed by: INTERNAL MEDICINE

## 2022-11-29 RX ORDER — EPINEPHRINE 0.1 MG/ML
1 INJECTION INTRACARDIAC; INTRAVENOUS
Status: CANCELLED | OUTPATIENT
Start: 2022-11-29 | End: 2022-11-30

## 2022-11-29 RX ORDER — MIDAZOLAM HYDROCHLORIDE 1 MG/ML
.25-5 INJECTION, SOLUTION INTRAMUSCULAR; INTRAVENOUS
Status: CANCELLED | OUTPATIENT
Start: 2022-11-29 | End: 2022-11-29

## 2022-11-29 RX ORDER — DEXTROMETHORPHAN/PSEUDOEPHED 2.5-7.5/.8
1.2 DROPS ORAL
Status: CANCELLED | OUTPATIENT
Start: 2022-11-29

## 2022-11-29 RX ORDER — PROPOFOL 10 MG/ML
INJECTION, EMULSION INTRAVENOUS AS NEEDED
Status: DISCONTINUED | OUTPATIENT
Start: 2022-11-29 | End: 2022-11-29 | Stop reason: HOSPADM

## 2022-11-29 RX ORDER — DIPHENHYDRAMINE HYDROCHLORIDE 50 MG/ML
50 INJECTION, SOLUTION INTRAMUSCULAR; INTRAVENOUS ONCE
Status: CANCELLED | OUTPATIENT
Start: 2022-11-29 | End: 2022-11-29

## 2022-11-29 RX ORDER — NALOXONE HYDROCHLORIDE 0.4 MG/ML
0.4 INJECTION, SOLUTION INTRAMUSCULAR; INTRAVENOUS; SUBCUTANEOUS
Status: CANCELLED | OUTPATIENT
Start: 2022-11-29 | End: 2022-11-29

## 2022-11-29 RX ORDER — FLUMAZENIL 0.1 MG/ML
0.2 INJECTION INTRAVENOUS
Status: CANCELLED | OUTPATIENT
Start: 2022-11-29 | End: 2022-11-29

## 2022-11-29 RX ORDER — ATROPINE SULFATE 0.1 MG/ML
0.5 INJECTION INTRAVENOUS
Status: CANCELLED | OUTPATIENT
Start: 2022-11-29 | End: 2022-11-30

## 2022-11-29 RX ORDER — SODIUM CHLORIDE 9 MG/ML
125 INJECTION, SOLUTION INTRAVENOUS CONTINUOUS
Status: DISCONTINUED | OUTPATIENT
Start: 2022-11-29 | End: 2022-11-29 | Stop reason: HOSPADM

## 2022-11-29 RX ADMIN — PROPOFOL 50 MG: 10 INJECTION, EMULSION INTRAVENOUS at 09:35

## 2022-11-29 RX ADMIN — PROPOFOL 50 MG: 10 INJECTION, EMULSION INTRAVENOUS at 09:27

## 2022-11-29 RX ADMIN — PROPOFOL 50 MG: 10 INJECTION, EMULSION INTRAVENOUS at 09:30

## 2022-11-29 RX ADMIN — SODIUM CHLORIDE: 0.9 INJECTION, SOLUTION INTRAVENOUS at 09:21

## 2022-11-29 RX ADMIN — PROPOFOL 100 MG: 10 INJECTION, EMULSION INTRAVENOUS at 09:25

## 2022-11-29 NOTE — ANESTHESIA PREPROCEDURE EVALUATION
Anesthetic History   No history of anesthetic complications            Review of Systems / Medical History  Patient summary reviewed, nursing notes reviewed and pertinent labs reviewed    Pulmonary    COPD: moderate               Neuro/Psych   Within defined limits           Cardiovascular    Hypertension: well controlled                   GI/Hepatic/Renal  Within defined limits              Endo/Other      Hypothyroidism  Arthritis     Other Findings   Comments: Sarcoid           Physical Exam    Airway  Mallampati: II  TM Distance: 4 - 6 cm  Neck ROM: decreased range of motion   Mouth opening: Normal     Cardiovascular  Regular rate and rhythm,  S1 and S2 normal,  no murmur, click, rub, or gallop             Dental  No notable dental hx       Pulmonary  Breath sounds clear to auscultation               Abdominal  GI exam deferred       Other Findings            Anesthetic Plan    ASA: 2  Anesthesia type: MAC          Induction: Intravenous  Anesthetic plan and risks discussed with: Patient

## 2022-11-29 NOTE — ROUTINE PROCESS
Sangita Montes  1950  [id number]    Situation:  Verbal report received from: Mary Avendano  Procedure: Procedure(s):  COLONOSCOPY  ENDOSCOPIC POLYPECTOMY    Background:    Preoperative diagnosis: Screen for colon cancer [Z12.11]  Postoperative diagnosis: colon polyp and anal papilla    :  Dr. Nitin Whelan  Assistant(s): Endoscopy Technician-1: Mitali Donald  Endoscopy RN-1: Dhiraj Lopes RN  Endoscopy RN-2: Osvaldo Skiff, RN    Specimens:   ID Type Source Tests Collected by Time Destination   1 : Colon, Ascending polyps Preservative Colon, Ascending  Ugo Hummel MD 2022 1047 Pathology     H. Pylori      Assessment:      Anesthesia gave intra-procedure sedation and medications, see anesthesia flow sheet   Intravenous fluids: NS@ KVO     Vital signs stable     Abdominal assessment: round and soft     Recommendation:  Discharge patient per MD order.     Family: Melani Burdick ()   Permission to share finding with family or friend yes

## 2022-11-29 NOTE — H&P
[address] [facility]  Outpatient History and Physical    Patient: Diann Cordoba    Physician: Jarrod Aquino MD    Vital Signs: Blood pressure 136/73, pulse (!) 52, temperature 98.3 °F (36.8 °C), resp. rate 16, height 5' 1\" (1.549 m), weight 66.3 kg (146 lb 3.2 oz), SpO2 100 %, not currently breastfeeding. Allergies: Allergies   Allergen Reactions    Lisinopril Swelling and Cough     Lips swell    Pcn [Penicillins] Hives    Hydrochlorothiazide Unknown (comments)     NA down, fatigue , myalgia       Chief Complaint: screening colonoscopy    History:  Past Medical History:   Diagnosis Date    Arthritis     OA    COPD     d/t sarcoidosis    Glaucoma     Hypertension     Hyponatremia 2009    FOLLOWING TREATMENT WITH HCTZ; NOW RESOLVED 9/24/12    Osteopenia     Post-menopause     Sarcoidosis     nonsymptomatic    Thyroid disease       Past Surgical History:   Procedure Laterality Date    BONE MARROW ASPIRATE &BIOPSY  1979    SARCOIDOSIS DIAGNOSED    HX DILATION AND CURETTAGE  1992    HX HIP REPLACEMENT  2012    right    HX HIP REPLACEMENT Left 08/26/2016    HX ORTHOPAEDIC      excision right wrist cyst    HX OTHER SURGICAL  1979    BRONCHOSCOPY    HX TUBAL LIGATION  1988      Social History     Socioeconomic History    Marital status:    Tobacco Use    Smoking status: Former     Packs/day: 0.50     Years: 30.00     Pack years: 15.00     Types: Cigarettes     Quit date: 5/4/2012     Years since quitting: 10.5    Smokeless tobacco: Never   Vaping Use    Vaping Use: Never used   Substance and Sexual Activity    Alcohol use: Yes     Alcohol/week: 28.0 standard drinks     Types: 28 Cans of beer per week     Comment: 3-4 beers daily, may have 6 beers on weekends    Drug use: No    Sexual activity: Never   Social History Narrative    Retired from [employer]. .   3 children, 2 sons, 1 daughter      Family History   Problem Relation Age of Onset    Arthritis-rheumatoid Mother Hypertension Mother     Elevated Lipids Mother     Cancer Mother         bladder    Stroke Mother     Elevated Lipids Sister     Hypertension Sister     Diabetes Sister     Elevated Lipids Sister     Hypertension Sister     Other Son         SARCOIDOSIS, KERATOCONUS    Other Daughter         KERATOCONUS    Anesth Problems Neg Hx        Medications:   Prior to Admission medications    Medication Sig Start Date End Date Taking? Authorizing Provider   bimatoprost (Lumigan) 0.01 % ophthalmic drops Administer 1 Drop to both eyes every evening. Yes Provider, Historical   atenoloL (TENORMIN) 50 mg tablet TAKE 1 TABLET BY MOUTH DAILY 2/16/22  Yes Ilda Hammer NP   amLODIPine (NORVASC) 5 mg tablet TAKE 1 TABLET BY MOUTH DAILY 2/16/22  Yes Ilda Hammer NP   candesartan (ATACAND) 16 mg tablet TAKE 1 TABLET BY MOUTH DAILY 2/16/22  Yes Ilda Hammer NP   methIMAzole (TAPAZOLE) 10 mg tablet Take 1 Tab by mouth daily. Patient taking differently: Take 10 mg by mouth daily. Mon - Thursday 12/4/19  Yes Ilda Hammer NP   fexofenadine (ALLEGRA) 180 mg tablet Take 1 Tab by mouth daily as needed for Itching. 8/19/19  Yes Antoinette Sharpe NP   multivitamin (ONE A DAY) tablet Take 1 Tab by mouth daily. Yes Provider, Historical   calcium carbonate-vitamin D3 1,000 mg(2,500 mg)-800 unit Tab Take 1 Tab by mouth daily. Yes Provider, Historical       Physical Exam:   General: alert, no distress   HEENT: Head: Normocephalic, no lesions, without obvious abnormality.    Heart: regular rate and rhythm, S1, S2 normal, no murmur, click, rub or gallop   Lungs: chest clear, no wheezing, rales, normal symmetric air entry   Abdominal: Bowel sounds are normal, liver is not enlarged, spleen is not enlarged   Neurological: Grossly normal   Extremities: extremities normal, atraumatic, no cyanosis or edema     Plan of Care/Planned Procedure: colonoscopy    The heart, lungs and mental status were satisfactory for the administration of MAC sedation and for the procedure. Informed consent was obtained for the procedure, including sedation. Risks of perforation, hemorrhage, adverse drug reaction, and aspiration were discussed. The risks, benefits and alternatives were again reiterated to the patient to include the risk of infection, bleeding, medication reaction, aspiration, perforation which could require immediate surgery, cardiopulmonary complication, issues with anesthesia and death. The patient was counseled at length about the risks of izabela Covid-19 in the mary-operative and post-operative states including the recovery window of their procedure. The patient was made aware that izabela Covid-19 after a surgical procedure may worsen their prognosis for recovering from the virus and lend to a higher morbidity and or mortality risk. The patient was given the options of postponing their procedure. All of the risks, benefits, and alternatives were discussed. The patient does  wish to proceed with the procedure.

## 2022-11-29 NOTE — PROGRESS NOTES
Endoscope was pre-cleaned at the bedside immediately following procedure by Doyle Jordan RN    Glasses returned to patient    Medications       propofol 10 mg/mL (mg)       Date/Time Rate/Dose/Volume Action Route Admin User Audit       11/29/22  0925 100 mg Given IntraVENous Curtistine Meza, CRNA       1684 50 mg Given IntraVENous Curtistine Meza, CRNA       0930 50 mg Given IntraVENous Curtistine Meza, CRNA       0935 50 mg Given IntraVENous Curtistine Meza, CRNA                0.9% sodium chloride infusion (mL) Dosing weight:  66.3      Date/Time Rate/Dose/Volume Action Route Admin User Audit       11/29/22  0921  New Bag IntraVENous Curtistine Meza, CRNA edited      6722  Rate Verify IntraVENous Curtistine Meza, CRNA       2283 500 mL Anesthesia Volume Adjustment IntraVENous Curtistine Meza, CRNA                        .

## 2022-11-29 NOTE — PROCEDURES
Colonoscopy Procedure Note    Indications:   See Preoperative Diagnosis above  Referring Physician: Scott Brandon NP  Anesthesia/Sedation: MAC anesthesia Propofol  Endoscopist:  Dr. Alexsandra White  Assistant:  Endoscopy Technician-1: Kamila Uribe  Endoscopy RN-1: Kaleigh Garcia RN  Endoscopy RN-2: Davy Estrada RN  Preoperative diagnosis: Screen for colon cancer [Z12.11]  Postoperative diagnosis: colon polyp    Procedure in Detail:  Informed consent was obtained for the procedure, including sedation. Risks of perforation, hemorrhage, adverse drug reaction, and aspiration were discussed. The patient was placed in the left lateral decubitus position. Based on the pre-procedure assessment, including review of the patient's medical history, medications, allergies, and review of systems, she had been deemed to be an appropriate candidate for moderate sedation; she was therefore sedated with the medications listed above. The patient was monitored continuously with ECG tracing, pulse oximetry, blood pressure monitoring, and direct observations. A rectal examination was performed. The [id number] was inserted into the rectum and advanced under direct vision to the terminal ileum. The quality of the colonic preparation was excellent BPS 9. A careful inspection was made as the colonoscope was withdrawn, including a retroflexed view of the rectum; findings and interventions are described below. Appropriate photodocumentation was obtained.     Findings:  KWASI: unremarkable  Rectum: normal  no mucosal lesion appreciated  Sigmoid: normal  no mucosal lesion appreciated  Descending Colon: normal  no mucosal lesion appreciated  Transverse Colon: normal  no mucosal lesion appreciated  Ascending Colon: two sessile polyps 3-4mm removed with cold snare polypectomy, retrieved, minimal bleeding  Cecum: normal  no mucosal lesion appreciated  Terminal Ileum: normal  no mucosal lesion appreciated    EBL: minimal    Complications: None; patient tolerated the procedure well. Recommendations:     - Await pathology.   - Repeat colonoscopy based on path  - discussed w/ Ab Smith      Signed By: Aneta Herndon MD                        November 29, 2022

## 2022-11-29 NOTE — ANESTHESIA POSTPROCEDURE EVALUATION
Procedure(s):  COLONOSCOPY  ENDOSCOPIC POLYPECTOMY. MAC    Anesthesia Post Evaluation      Multimodal analgesia: multimodal analgesia used between 6 hours prior to anesthesia start to PACU discharge  Patient location during evaluation: PACU  Patient participation: complete - patient participated  Level of consciousness: sleepy but conscious and responsive to verbal stimuli  Pain score: 1  Pain management: adequate  Airway patency: patent  Anesthetic complications: no  Cardiovascular status: acceptable  Respiratory status: acceptable  Hydration status: acceptable  Comments: +Post-Anesthesia Evaluation and Assessment    Patient: Jeannette Oates MRN: [id number]  SSN: [id number]   YOB: 1950  Age: 81 y.o. Sex: female      Cardiovascular Function/Vital Signs    BP 94/70   Pulse 62   Temp 36.8 °C (98.3 °F)   Resp 17   Ht 5' 1\" (1.549 m)   Wt 66.3 kg (146 lb 3.2 oz)   SpO2 100%   Breastfeeding No   BMI 27.62 kg/m²     Patient is status post Procedure(s):  COLONOSCOPY  ENDOSCOPIC POLYPECTOMY. Nausea/Vomiting: Controlled. Postoperative hydration reviewed and adequate. Pain:  Pain Scale 1: Numeric (0 - 10) (11/29/22 0852)  Pain Intensity 1: 0 (11/29/22 6455)   Managed. Neurological Status: At baseline. Mental Status and Level of Consciousness: Arousable. Pulmonary Status:   O2 Device: CO2 nasal cannula (11/29/22 0942)   Adequate oxygenation and airway patent. Complications related to anesthesia: None    Post-anesthesia assessment completed. No concerns.     Signed By: Sonia Cobb MD    11/29/2022  Post anesthesia nausea and vomiting:  controlled  Final Post Anesthesia Temperature Assessment:  Normothermia (36.0-37.5 degrees C)      INITIAL Post-op Vital signs:   Vitals Value Taken Time   BP 94/70 11/29/22 0942   Temp     Pulse 62 11/29/22 0942   Resp 17 11/29/22 0942   SpO2 100 % 11/29/22 0942

## 2022-11-29 NOTE — DISCHARGE INSTRUCTIONS
Brandee Marie  [id number]  1950    It was my pleasure seeing you for your procedure. You will also receive a summary report with the findings from this procedure and any further recommendations. If you had polyps removed or biopsies taken during your procedure, you will receive a separate letter from me within the next 2 weeks. If you don't receive this letter or if you have any questions, please call my office [phone]. Please take note of the post procedure instructions listed below. Javi Wishes,    Dr. Gelacio Phillips    These instructions give you information on caring for yourself after your procedure. Call your doctor if you have any problems or questions after your procedure. HOME CARE  Walk if you have belly cramping or gas. Walking will help get rid of the air and reduce the bloated feeling in your belly (abdomen). Your IV site (where you received drugs) may be tender to touch. Place warm towels on the site; keep your arm up on two pillows if you have any swelling or soreness in the area. You may shower. ACTIVITY:  Take frequent rest periods and move at a slower pace for the next 24 hours. .  You may resume your regular activity tomorrow if you are feeling back to normal.  Do not drive or ride a bicycle for at least 24 hours (because of the medicine (anesthesia) used during the test). Do not sign any important legal documents or use or operate any machinery for 24 hours  Do not take sleeping medicines/nerve drugs for 24 hours unless the doctor tells you. You can return to work/school tomorrow unless otherwise instructed. NUTRITION:  Drink plenty of fluids to keep your pee (urine) clear or pale yellow  Begin with a light meal and progress to your normal diet. Heavy or fried foods are harder to digest and may make you feel sick to your stomach (nauseated).   Once you are feeling back to normal, you may resume your normal diet as instructed by your doctor. Avoid alcoholic beverages for 24 hours or as instructed. IF YOU HAD BIOPSIES TAKEN OR POLYPS REMOVED DURING THE PROCEDURE:  For the next 7 days, avoid all non-steroidal antiinflammatory medications such as Ibuprofen, Motrin, Advil, Alleve, Jeanine-seltzer, Goody's powder, BC powder. If you do not have an heart condition that requires you to take a daily aspirin, you should avoid taking aspirin for 7 days. Eat a soft diet for 24 hours. Monitor your stools for any blood or dark black, tar-like, stools as this may be a sign of bleeding and if you see any blood, notify your doctor immediately. GET HELP RIGHT AWAY AND SEEK IMMEDIATE MEDICAL CARE IF:  You have more than a spotting of blood in your stool. You pass clumps of tissue (blood clots) or fill the toilet with blood. Your belly is painfully swollen or puffy (abdominal distention). You throw up (vomit). You have a fever. You have redness, pain or swelling at the IV site that last greater than two days. You have abdominal pain or discomfort that is severe or gets worse throughout the day. Post-procedure diagnosis:  colon polyp    Post-procedure recommendations:          Learning About Coronavirus (COVID-19)  Coronavirus (COVID-19): Overview  What is coronavirus (COVID-19)? The coronavirus disease (COVID-19) is caused by a virus. It is an illness that was first found in [place], [place], in December 2019. It has since spread worldwide. The virus can cause fever, cough, and trouble breathing. In severe cases, it can cause pneumonia and make it hard to breathe without help. It can cause death. Coronaviruses are a large group of viruses. They cause the common cold. They also cause more serious illnesses like Middle East respiratory syndrome (MERS) and severe acute respiratory syndrome (SARS). COVID-19 is caused by a novel coronavirus. That means it's a new type that has not been seen in people before.   This virus spreads person-to-person through droplets from coughing and sneezing. It can also spread when you are close to someone who is infected. And it can spread when you touch something that has the virus on it, such as a doorknob or a tabletop. What can you do to protect yourself from coronavirus (COVID-19)? The best way to protect yourself from getting sick is to: Avoid areas where there is an outbreak. Avoid contact with people who may be infected. Wash your hands often with soap or alcohol-based hand sanitizers. Avoid crowds and try to stay at least 6 feet away from other people. Wash your hands often, especially after you cough or sneeze. Use soap and water, and scrub for at least 20 seconds. If soap and water aren't available, use an alcohol-based hand . Avoid touching your mouth, nose, and eyes. What can you do to avoid spreading the virus to others? To help avoid spreading the virus to others:  Cover your mouth with a tissue when you cough or sneeze. Then throw the tissue in the trash. Use a disinfectant to clean things that you touch often. Stay home if you are sick or have been exposed to the virus. Don't go to school, work, or public areas. And don't use public transportation. If you are sick:  Leave your home only if you need to get medical care. But call the doctor's office first so they know you're coming. And wear a face mask, if you have one. If you have a face mask, wear it whenever you're around other people. It can help stop the spread of the virus when you cough or sneeze. Clean and disinfect your home every day. Use household  and disinfectant wipes or sprays. Take special care to clean things that you grab with your hands. These include doorknobs, remote controls, phones, and handles on your refrigerator and microwave. And don't forget countertops, tabletops, bathrooms, and computer keyboards.   When to call for help  Call 911 anytime you think you may need emergency care. For example, call if:  You have severe trouble breathing. (You can't talk at all.)  You have constant chest pain or pressure. You are severely dizzy or lightheaded. You are confused or can't think clearly. Your face and lips have a blue color. You pass out (lose consciousness) or are very hard to wake up. Call your doctor now if you develop symptoms such as:  Shortness of breath. Fever. Cough. If you need to get care, call ahead to the doctor's office for instructions before you go. Make sure you wear a face mask, if you have one, to prevent exposing other people to the virus. Where can you get the latest information? The following health organizations are tracking and studying this virus. Their websites contain the most up-to-date information. Sushila Wellington also learn what to do if you think you may have been exposed to the virus. U.S. Centers for Disease Control and Prevention (CDC): The CDC provides updated news about the disease and travel advice. The website also tells you how to prevent the spread of infection. www.cdc.gov  World Health Organization [facility]): WHO offers information about the virus outbreaks. WHO also has travel advice. www.who.int  Current as of: April 1, 2020               Content Version: 12.4  © 2006-2020 [employer]. Care instructions adapted under license by your healthcare professional. If you have questions about a medical condition or this instruction, always ask your healthcare professional. [place] [address] any warranty or liability for your use of this information. Patient given verbal and written discharge instructions. Patient given opportunity to ask questions. Patient able to verbalize understanding of these instructions.      Patient Education on Sedation / Analgesia Administered for Procedure      For 24 hours after general anesthesia or intravenous analgesia / sedation:  Have someone responsible help you with your care  Limit your activities  Do not drive and operate hazardous machinery  Do not make important personal, legal or business decisions  Do not drink alcoholic beverages  If you have not urinated within 8 hours after discharge, please contact your physician  Resume your medications unless otherwise instructed    For 24 hours after general anesthesia or intravenous analgesia / sedation  you may experience:  Drowsiness, dizziness, sleepiness, or confusion  Difficulty remembering or delayed reaction times  Difficulty with your balance, especially while walking, move slowly and carefully, do not make sudden position changes  Difficulty focusing or blurred vision    You may not be aware of slight changes in your behavior and/or your reaction time because of the medication used during and after your procedure.     Report the following to your physician:  Excessive pain, swelling, redness or odor of or around the surgical area  Temperature over 100.5  Nausea and vomiting lasting longer than 4 hours or if unable to take medications  Any signs of decreased circulation or nerve impairment to extremity: change in color, persistent numbness, tingling, coldness or increase pain  Any questions or concerns    IF YOU REPORT TO AN EMERGENCY ROOM, DOCTOR'S OFFICE OR HOSPITAL WITHIN 24 HOURS AFTER YOUR PROCEDURE, BRING THIS SHEET AND YOUR AFTER VISIT SUMMARY WITH YOU AND GIVE IT TO THE PHYSICIAN OR NURSE ATTENDING YOU.

## 2022-12-14 ENCOUNTER — OFFICE VISIT (OUTPATIENT)
Dept: FAMILY MEDICINE CLINIC | Age: 72
End: 2022-12-14
Payer: MEDICARE

## 2022-12-14 VITALS
OXYGEN SATURATION: 99 % | TEMPERATURE: 97.5 F | BODY MASS INDEX: 27.9 KG/M2 | HEART RATE: 58 BPM | WEIGHT: 147.8 LBS | HEIGHT: 61 IN | DIASTOLIC BLOOD PRESSURE: 70 MMHG | RESPIRATION RATE: 16 BRPM | SYSTOLIC BLOOD PRESSURE: 130 MMHG

## 2022-12-14 DIAGNOSIS — E05.90 HYPERTHYROIDISM: ICD-10-CM

## 2022-12-14 DIAGNOSIS — D86.9 SARCOIDOSIS: ICD-10-CM

## 2022-12-14 DIAGNOSIS — Z00.00 MEDICARE ANNUAL WELLNESS VISIT, SUBSEQUENT: ICD-10-CM

## 2022-12-14 DIAGNOSIS — I10 ESSENTIAL HYPERTENSION: Primary | ICD-10-CM

## 2022-12-14 DIAGNOSIS — M85.80 OSTEOPENIA, UNSPECIFIED LOCATION: ICD-10-CM

## 2022-12-14 LAB
ALBUMIN SERPL-MCNC: 4.1 G/DL (ref 3.5–5)
ALBUMIN/GLOB SERPL: 1.2 {RATIO} (ref 1.1–2.2)
ALP SERPL-CCNC: 97 U/L (ref 45–117)
ALT SERPL-CCNC: 10 U/L (ref 12–78)
ANION GAP SERPL CALC-SCNC: 4 MMOL/L (ref 5–15)
AST SERPL-CCNC: 13 U/L (ref 15–37)
BILIRUB SERPL-MCNC: 0.4 MG/DL (ref 0.2–1)
BUN SERPL-MCNC: 12 MG/DL (ref 6–20)
BUN/CREAT SERPL: 11 (ref 12–20)
CALCIUM SERPL-MCNC: 10 MG/DL (ref 8.5–10.1)
CHLORIDE SERPL-SCNC: 98 MMOL/L (ref 97–108)
CHOLEST SERPL-MCNC: 183 MG/DL
CO2 SERPL-SCNC: 29 MMOL/L (ref 21–32)
CREAT SERPL-MCNC: 1.06 MG/DL (ref 0.55–1.02)
ERYTHROCYTE [DISTWIDTH] IN BLOOD BY AUTOMATED COUNT: 11.9 % (ref 11.5–14.5)
GLOBULIN SER CALC-MCNC: 3.4 G/DL (ref 2–4)
GLUCOSE SERPL-MCNC: 95 MG/DL (ref 65–100)
HCT VFR BLD AUTO: 33.1 % (ref 35–47)
HDLC SERPL-MCNC: 124 MG/DL
HDLC SERPL: 1.5 {RATIO} (ref 0–5)
HGB BLD-MCNC: 10.9 G/DL (ref 11.5–16)
LDLC SERPL CALC-MCNC: 41.4 MG/DL (ref 0–100)
MCH RBC QN AUTO: 30.3 PG (ref 26–34)
MCHC RBC AUTO-ENTMCNC: 32.9 G/DL (ref 30–36.5)
MCV RBC AUTO: 91.9 FL (ref 80–99)
NRBC # BLD: 0 K/UL (ref 0–0.01)
NRBC BLD-RTO: 0 PER 100 WBC
PLATELET # BLD AUTO: 335 K/UL (ref 150–400)
PMV BLD AUTO: 8.1 FL (ref 8.9–12.9)
POTASSIUM SERPL-SCNC: 4.9 MMOL/L (ref 3.5–5.1)
PROT SERPL-MCNC: 7.5 G/DL (ref 6.4–8.2)
RBC # BLD AUTO: 3.6 M/UL (ref 3.8–5.2)
SODIUM SERPL-SCNC: 131 MMOL/L (ref 136–145)
TRIGL SERPL-MCNC: 88 MG/DL (ref ?–150)
VLDLC SERPL CALC-MCNC: 17.6 MG/DL
WBC # BLD AUTO: 6.4 K/UL (ref 3.6–11)

## 2022-12-14 PROCEDURE — G8399 PT W/DXA RESULTS DOCUMENT: HCPCS | Performed by: NURSE PRACTITIONER

## 2022-12-14 PROCEDURE — 1090F PRES/ABSN URINE INCON ASSESS: CPT | Performed by: NURSE PRACTITIONER

## 2022-12-14 PROCEDURE — G8754 DIAS BP LESS 90: HCPCS | Performed by: NURSE PRACTITIONER

## 2022-12-14 PROCEDURE — 1123F ACP DISCUSS/DSCN MKR DOCD: CPT | Performed by: NURSE PRACTITIONER

## 2022-12-14 PROCEDURE — G8432 DEP SCR NOT DOC, RNG: HCPCS | Performed by: NURSE PRACTITIONER

## 2022-12-14 PROCEDURE — 99213 OFFICE O/P EST LOW 20 MIN: CPT | Performed by: NURSE PRACTITIONER

## 2022-12-14 PROCEDURE — G8417 CALC BMI ABV UP PARAM F/U: HCPCS | Performed by: NURSE PRACTITIONER

## 2022-12-14 PROCEDURE — 3074F SYST BP LT 130 MM HG: CPT | Performed by: NURSE PRACTITIONER

## 2022-12-14 PROCEDURE — G8752 SYS BP LESS 140: HCPCS | Performed by: NURSE PRACTITIONER

## 2022-12-14 PROCEDURE — G9899 SCRN MAM PERF RSLTS DOC: HCPCS | Performed by: NURSE PRACTITIONER

## 2022-12-14 PROCEDURE — 1101F PT FALLS ASSESS-DOCD LE1/YR: CPT | Performed by: NURSE PRACTITIONER

## 2022-12-14 PROCEDURE — G8427 DOCREV CUR MEDS BY ELIG CLIN: HCPCS | Performed by: NURSE PRACTITIONER

## 2022-12-14 PROCEDURE — 3017F COLORECTAL CA SCREEN DOC REV: CPT | Performed by: NURSE PRACTITIONER

## 2022-12-14 PROCEDURE — G0439 PPPS, SUBSEQ VISIT: HCPCS | Performed by: NURSE PRACTITIONER

## 2022-12-14 PROCEDURE — 3078F DIAST BP <80 MM HG: CPT | Performed by: NURSE PRACTITIONER

## 2022-12-14 PROCEDURE — G0463 HOSPITAL OUTPT CLINIC VISIT: HCPCS | Performed by: NURSE PRACTITIONER

## 2022-12-14 PROCEDURE — G8536 NO DOC ELDER MAL SCRN: HCPCS | Performed by: NURSE PRACTITIONER

## 2022-12-14 NOTE — PROGRESS NOTES
Chief Complaint   Patient presents with    Hypertension     6m fu     Thyroid Problem     6m fu        1. \"Have you been to the ER, urgent care clinic since your last visit? Hospitalized since your last visit? \" Yes When: admitted to [facility] 11/29 for colonoscopy    2. \"Have you seen or consulted any other health care providers outside of the [address] [place] [address] since your last visit? \" No     3. For patients aged 39-70: Has the patient had a colonoscopy / FIT/ Cologuard? Yes - Care Gap present. Most recent result on file      If the patient is female:    4. For patients aged 41-77: Has the patient had a mammogram within the past 2 years? Yes - Care Gap present. Most recent result on file      5. For patients aged 21-65: Has the patient had a pap smear?  NA - based on age or sex    Health Maintenance Due   Topic Date Due    Shingrix Vaccine Age 49> (1 of 2) Never done    COVID-19 Vaccine (3 - Booster for Moderna series) 05/21/2021    Flu Vaccine (1) 08/01/2022    Medicare Yearly Exam  11/20/2022

## 2022-12-14 NOTE — PROGRESS NOTES
Marciano Sierra (: 1950) is a 81 y.o. female, established patient, here for evaluation of the following chief complaint(s):  Hypertension (6m fu ) and Thyroid Problem (6m fu )       ASSESSMENT/PLAN:  Below is the assessment and plan developed based on review of pertinent history, physical exam, labs, studies, and medications. 1. Essential hypertension - stable. -     CBC W/O DIFF; Future  -     METABOLIC PANEL, COMPREHENSIVE; Future  -     LIPID PANEL; Future  2. Hyperthyroidism - will get labs from endocrine office. Will work with patient to adjust medication. If I have difficulty treating, will refer to another endo. Patient agreeable with plan  3. Sarcoidosis - stable. Per pulmonary  4. Osteopenia, unspecified location - DEXA 2022 - normal  5. Medicare annual wellness visit, subsequent    Return in about 6 weeks (around 2023). SUBJECTIVE/OBJECTIVE:  HPI  patient comes in today for follow up HTN  Dr. Clark Ruiz  - endocrinology - had labs done at end of 2022, patient states TSH was >100. States she has been off medication since . She states she is getting frustrated with the endocrinologist approach, stopping and restarting medication. She would like for me to take over thyroid management. Hx pulmonary sarcoid, last flare >30 years ago. saw Dr. Cuong Florez in office in 2022, had CXR and PFTs. Given clear to follow up in 1 year. lungs were stable. Hx of osteopenia, saw Dr. Yuri Salazar in Oct 2022. She has been off fosamax since 2018, had DEXA done 20. Continued on calcium with vitamin D.  had repeat DEXA in 2022 - normal.  No signes of osteoporosis. Plan to repeat DEXA in  and if needed, would consider Prolia. Colonoscopy in  with Dr. Juan M Winchester. .  states no polyps were found. Did Cologuard 2022 - was positive. Had repeat colonoscopy 2022 - found 2 polyps in ascending colon - tubular adenoma.   She was told to follow up in 7 years per patient,     Hx HTN, states compliance with medications. Tries to follow low sodium diet. Denies chest pains, palpitations, dizziness, dyspnea, tingling in extremities. Urinating without difficulty     Dr. Karli Mello for glaucoma. -sees every 4 months. Started her on lumigan  Allergies   Allergen Reactions    Lisinopril Swelling and Cough     Lips swell    Pcn [Penicillins] Hives    Hydrochlorothiazide Unknown (comments)     NA down, fatigue , myalgia       Past Medical History:   Diagnosis Date    Arthritis     OA    COPD     d/t sarcoidosis    Glaucoma     Hypertension     Hyponatremia 2009    FOLLOWING TREATMENT WITH HCTZ; NOW RESOLVED 9/24/12    Osteopenia     Post-menopause     Sarcoidosis     nonsymptomatic    Thyroid disease        Past Surgical History:   Procedure Laterality Date    BONE MARROW ASPIRATE &BIOPSY  1979    SARCOIDOSIS DIAGNOSED    COLONOSCOPY N/A 11/29/2022    COLONOSCOPY performed by Arline Carson MD at [facility] ENDOSCOPY     [address] REPLACEMENT  2012    right    HX HIP REPLACEMENT Left 08/26/2016    HX ORTHOPAEDIC      excision right wrist cyst    HX OTHER SURGICAL  1979    BRONCHOSCOPY    HX TUBAL LIGATION  1988       Social History     Socioeconomic History    Marital status:      Spouse name: Not on file    Number of children: Not on file    Years of education: Not on file    Highest education level: Not on file   Occupational History    Not on file   Tobacco Use    Smoking status: Former     Packs/day: 0.50     Years: 30.00     Pack years: 15.00     Types: Cigarettes     Quit date: 5/4/2012     Years since quitting: 10.6    Smokeless tobacco: Never   Vaping Use    Vaping Use: Never used   Substance and Sexual Activity    Alcohol use:  Yes     Alcohol/week: 28.0 standard drinks     Types: 28 Cans of beer per week     Comment: 3-4 beers daily, may have 6 beers on weekends    Drug use: No    Sexual activity: Never   Other Topics Concern Not on file   Social History Narrative    Retired from [employer]. . 3 children, 2 sons, 1 daughter     Social Determinants of Health     Financial Resource Strain: Not on file   Food Insecurity: Not on file   Transportation Needs: Not on file   Physical Activity: Not on file   Stress: Not on file   Social Connections: Not on file   Intimate Partner Violence: Not on file   Housing Stability: Not on file       Family History   Problem Relation Age of Onset    Arthritis-rheumatoid Mother     Hypertension Mother     Elevated Lipids Mother     Cancer Mother         bladder    Stroke Mother     Elevated Lipids Sister     Hypertension Sister     Diabetes Sister     Elevated Lipids Sister     Hypertension Sister     Other Son         SARCOIDOSIS, KERATOCONUS    Other Daughter         KERATOCONUS    Anesth Problems Neg Hx        Current Outpatient Medications   Medication Sig    bimatoprost (Lumigan) 0.01 % ophthalmic drops Administer 1 Drop to both eyes every evening. atenoloL (TENORMIN) 50 mg tablet TAKE 1 TABLET BY MOUTH DAILY    amLODIPine (NORVASC) 5 mg tablet TAKE 1 TABLET BY MOUTH DAILY    candesartan (ATACAND) 16 mg tablet TAKE 1 TABLET BY MOUTH DAILY    methIMAzole (TAPAZOLE) 10 mg tablet Take 1 Tab by mouth daily. (Patient taking differently: Take 10 mg by mouth daily. Mon - Thursday)    fexofenadine (ALLEGRA) 180 mg tablet Take 1 Tab by mouth daily as needed for Itching. multivitamin (ONE A DAY) tablet Take 1 Tab by mouth daily. calcium carbonate-vitamin D3 1,000 mg(2,500 mg)-800 unit Tab Take 1 Tab by mouth daily. No current facility-administered medications for this visit. Review of Systems   Constitutional:  Negative for chills, diaphoresis, fatigue, fever and unexpected weight change. Respiratory:  Negative for cough and shortness of breath. Cardiovascular:  Negative for chest pain, palpitations and leg swelling.    Gastrointestinal:  Negative for abdominal pain, blood in stool, constipation, diarrhea, nausea and vomiting. Endocrine: Negative for cold intolerance and heat intolerance. Genitourinary:  Negative for dysuria, flank pain, frequency, hematuria and urgency. Musculoskeletal:  Negative for back pain and myalgias. Skin: Negative. Skin has been itching since being off thyroid medication   Neurological:  Negative for dizziness, speech difficulty, light-headedness, numbness and headaches. Psychiatric/Behavioral:  Negative for dysphoric mood and sleep disturbance. The patient is not nervous/anxious. Vitals:    12/14/22 1038   BP: 130/70   Pulse: (!) 58   Resp: 16   Temp: 97.5 °F (36.4 °C)   TempSrc: Oral   SpO2: 99%   Weight: 147 lb 12.8 oz (67 kg)   Height: 5' 1\" (1.549 m)     Physical Exam  Vitals reviewed. Constitutional:       Appearance: Normal appearance. She is well-developed, well-groomed and normal weight. HENT:      Right Ear: Hearing normal.      Left Ear: Hearing normal.   Neck:      Thyroid: No thyromegaly. Cardiovascular:      Rate and Rhythm: Normal rate and regular rhythm. Pulses:           Dorsalis pedis pulses are 2+ on the right side and 2+ on the left side. Heart sounds: Normal heart sounds, S1 normal and S2 normal.   Pulmonary:      Effort: Pulmonary effort is normal.      Breath sounds: Normal breath sounds. Abdominal:      General: Bowel sounds are normal.      Palpations: Abdomen is soft. Tenderness: There is no abdominal tenderness. Musculoskeletal:      Right lower leg: No edema. Left lower leg: No edema. Lymphadenopathy:      Cervical: No cervical adenopathy. Skin:     General: Skin is warm and dry. Neurological:      Mental Status: She is alert and oriented to person, place, and time. Psychiatric:         Attention and Perception: Attention normal.         Mood and Affect: Mood and affect normal.         Speech: Speech normal.         Behavior: Behavior normal. Behavior is cooperative. Thought Content: Thought content normal.         Cognition and Memory: Cognition and memory normal.     On this date 12/14/2022 I have spent 25 minutes not incl AWV reviewing previous notes, test results and face to face with the patient discussing the diagnosis and importance of compliance with the treatment plan as well as documenting on the day of the visit. An electronic signature was used to authenticate this note. -- Susan Melton NP     This is the Subsequent Medicare Annual Wellness Exam, performed 12 months or more after the Initial AWV or the last Subsequent AWV    I have reviewed the patient's medical history in detail and updated the computerized patient record. Assessment/Plan   Education and counseling provided:  Are appropriate based on today's review and evaluation  Pneumococcal Vaccine  Influenza Vaccine  Screening Mammography  Colorectal cancer screening tests  Cardiovascular screening blood test  Bone mass measurement (DEXA)  Screening for glaucoma    1. Essential hypertension  -     CBC W/O DIFF; Future  -     METABOLIC PANEL, COMPREHENSIVE; Future  -     LIPID PANEL; Future  2. Hyperthyroidism  3. Sarcoidosis  4. Osteopenia, unspecified location  5. Medicare annual wellness visit, subsequent       Depression Risk Factor Screening     3 most recent PHQ Screens 12/14/2022   Little interest or pleasure in doing things Not at all   Feeling down, depressed, irritable, or hopeless Not at all   Total Score PHQ 2 0       Alcohol & Drug Abuse Risk Screen    Do you average more than 1 drink per night or more than 7 drinks a week:  No    On any one occasion in the past three months have you have had more than 3 drinks containing alcohol:  No          Functional Ability and Level of Safety    Hearing: Hearing is good. Activities of Daily Living: The home contains: no safety equipment.   Patient does total self care      Ambulation: with no difficulty     Fall Risk:  Fall Risk Assessment, last 12 mths 12/14/2022   Able to walk? Yes   Fall in past 12 months? 0   Do you feel unsteady? 0   Are you worried about falling 0      Abuse Screen:  Patient is not abused       Cognitive Screening    Has your family/caregiver stated any concerns about your memory: no     Cognitive Screening: Normal -      Health Maintenance Due     Health Maintenance Due   Topic Date Due    Shingrix Vaccine Age 49> (1 of 2) Never done    COVID-19 Vaccine (5 - Booster for Moderna series) 09/09/2022    Medicare Yearly Exam  11/20/2022       Patient Care Team   Patient Care Team:  Denia Abdullahi NP as PCP - General (Nurse Practitioner)  Denia Abdullahi NP as PCP - Mayelin Ye Provider    History     Patient Active Problem List   Diagnosis Code    HTN (hypertension) I10    Hyponatremia E87.1    Osteoarthritis, knee M17.9    Sarcoidosis,lung D86.9    Osteopenia M85.80    Primary osteoarthritis of left hip M16.12    Degenerative joint disease (DJD) of hip M16.9    Abnormal findings on diagnostic imaging of other specified body structures  R93.89     Past Medical History:   Diagnosis Date    Arthritis     OA    COPD     d/t sarcoidosis    Glaucoma     Hypertension     Hyponatremia 2009    FOLLOWING TREATMENT WITH HCTZ; NOW RESOLVED 9/24/12    Osteopenia     Post-menopause     Sarcoidosis     nonsymptomatic    Thyroid disease       Past Surgical History:   Procedure Laterality Date    BONE MARROW ASPIRATE &BIOPSY  1979    SARCOIDOSIS DIAGNOSED    COLONOSCOPY N/A 11/29/2022    COLONOSCOPY performed by Verona Kaufman MD at [address] [place] [address]    HX HIP REPLACEMENT  2012    right    HX HIP REPLACEMENT Left 08/26/2016    HX ORTHOPAEDIC      excision right wrist cyst    HX OTHER SURGICAL  1979    BRONCHOSCOPY    HX TUBAL LIGATION  1988     Current Outpatient Medications   Medication Sig Dispense Refill    bimatoprost (Lumigan) 0.01 % ophthalmic drops Administer 1 Drop to both eyes every evening. atenoloL (TENORMIN) 50 mg tablet TAKE 1 TABLET BY MOUTH DAILY 90 Tablet 3    amLODIPine (NORVASC) 5 mg tablet TAKE 1 TABLET BY MOUTH DAILY 90 Tablet 3    candesartan (ATACAND) 16 mg tablet TAKE 1 TABLET BY MOUTH DAILY 90 Tablet 3    methIMAzole (TAPAZOLE) 10 mg tablet Take 1 Tab by mouth daily. (Patient taking differently: Take 10 mg by mouth daily. Mon - Thursday) 30 Tab 1    fexofenadine (ALLEGRA) 180 mg tablet Take 1 Tab by mouth daily as needed for Itching. 30 Tab 1    multivitamin (ONE A DAY) tablet Take 1 Tab by mouth daily. calcium carbonate-vitamin D3 1,000 mg(2,500 mg)-800 unit Tab Take 1 Tab by mouth daily. Allergies   Allergen Reactions    Lisinopril Swelling and Cough     Lips swell    Pcn [Penicillins] Hives    Hydrochlorothiazide Unknown (comments)     NA down, fatigue , myalgia       Family History   Problem Relation Age of Onset    Arthritis-rheumatoid Mother     Hypertension Mother     Elevated Lipids Mother     Cancer Mother         bladder    Stroke Mother     Elevated Lipids Sister     Hypertension Sister     Diabetes Sister     Elevated Lipids Sister     Hypertension Sister     Other Son         SARCOIDOSIS, KERATOCONUS    Other Daughter         KERATOCONUS    Anesth Problems Neg Hx      Social History     Tobacco Use    Smoking status: Former     Packs/day: 0.50     Years: 30.00     Pack years: 15.00     Types: Cigarettes     Quit date: 5/4/2012     Years since quitting: 10.6    Smokeless tobacco: Never   Substance Use Topics    Alcohol use:  Yes     Alcohol/week: 28.0 standard drinks     Types: 28 Cans of beer per week     Comment: 3-4 beers daily, may have 6 beers on weekends         Racheal Rey NP

## 2022-12-14 NOTE — PATIENT INSTRUCTIONS
Medicare Wellness Visit, Female     The best way to live healthy is to have a lifestyle where you eat a well-balanced diet, exercise regularly, limit alcohol use, and quit all forms of tobacco/nicotine, if applicable. Regular preventive services are another way to keep healthy. Preventive services (vaccines, screening tests, monitoring & exams) can help personalize your care plan, which helps you manage your own care. Screening tests can find health problems at the earliest stages, when they are easiest to treat. [facility] follows the current, evidence-based guidelines published by the [facility] ([facility]) when recommending preventive services for our patients. Because we follow these guidelines, sometimes recommendations change over time as research supports it. (For example, mammograms used to be recommended annually. Even though Medicare will still pay for an annual mammogram, the newer guidelines recommend a mammogram every two years for women of average risk). Of course, you and your doctor may decide to screen more often for some diseases, based on your risk and your co-morbidities (chronic disease you are already diagnosed with). Preventive services for you include:  - Medicare offers their members a free annual wellness visit, which is time for you and your primary care provider to discuss and plan for your preventive service needs.  Take advantage of this benefit every year!    -Over the age of 72 should receive the recommended pneumonia vaccines.    -All adults should have a flu vaccine yearly.  -All adults should have a tetanus vaccine every 10 years.   -Over the age 48 should receive the shingles vaccines.        -All adults should be screened once for Hepatitis C.  -All adults age 38-68 who are overweight should have a diabetes screening test once every three years.   -Other screening tests and preventive services for persons with diabetes include: an eye exam to screen for diabetic retinopathy, a kidney function test, a foot exam, and stricter control over your cholesterol.   -Cardiovascular screening for adults with routine risk involves an electrocardiogram (ECG) at intervals determined by your doctor.     -Colorectal cancer screenings should be done for adults age 39-70 with no increased risk factors for colorectal cancer. There are a number of acceptable methods of screening for this type of cancer. Each test has its own benefits and drawbacks. Discuss with your doctor what is most appropriate for you during your annual wellness visit. The different tests include: colonoscopy (considered the best screening method), a fecal occult blood test, a fecal DNA test, and sigmoidoscopy.    -Lung cancer screening is recommended annually with a low dose CT scan for adults between age 54 and 68, who have smoked at least 30 pack years (equivalent of 1 pack per day for 30 days), and who is a current smoker or quit less than 15 years ago.    -A bone mass density test is recommended when a woman turns 65 to screen for osteoporosis. This test is only recommended one time, as a screening. Some providers will use this same test as a disease monitoring tool if you already have osteoporosis. -Breast cancer screenings are recommended every other year for women of normal risk, age 54-69.    -Cervical cancer screenings for women over age 72 are only recommended with certain risk factors.      Here is a list of your current Health Maintenance items (your personalized list of preventive services) with a due date:  Health Maintenance Due   Topic Date Due    Shingles Vaccine (1 of 2) Never done    COVID-19 Vaccine (5 - Booster for Johnice Minks series) 09/09/2022    Annual Well Visit  11/20/2022

## 2022-12-16 NOTE — PROGRESS NOTES
Florinda Rojas - patient signed release for labs from Dr. Caro Guo - have we received anything yet? If not, please call and ask them to fax her labs    Cholesterol numbers look great! Liver and kidney function normal.    Sodium level is a little low - this could be because of your abnormal thyroid levels. I have not received labs from Dr. Caro Guo yet. Will have my nurse check again. Hemoglobin has dropped a little more, indicating anemia. This can be caused by  not enough iron in the diet, or bleeding inside the intestinal tract (from ulcers, colon polyps , colon cancer, hemorrhoids , or other conditions). I would like to check your iron levels. We can recheck labs at your next visit.

## 2022-12-19 ENCOUNTER — TELEPHONE (OUTPATIENT)
Dept: FAMILY MEDICINE CLINIC | Age: 72
End: 2022-12-19

## 2022-12-19 NOTE — TELEPHONE ENCOUNTER
Verified patient with two type of identifiers. Spoke to pt - reviewed lab results per Madi Salazar NP. Pt verbalized understanding.

## 2022-12-19 NOTE — TELEPHONE ENCOUNTER
----- Message from Alba Emmanuel NP sent at 12/16/2022  8:13 AM EST -----  Omar Friar - patient signed release for labs from Dr. Lila Dong - have we received anything yet? If not, please call and ask them to fax her labs    Cholesterol numbers look great! Liver and kidney function normal.    Sodium level is a little low - this could be because of your abnormal thyroid levels. I have not received labs from Dr. Lila Dong yet. Will have my nurse check again. Hemoglobin has dropped a little more, indicating anemia. This can be caused by  not enough iron in the diet, or bleeding inside the intestinal tract (from ulcers, colon polyps , colon cancer, hemorrhoids , or other conditions). I would like to check your iron levels. We can recheck labs at your next visit.

## 2022-12-23 ENCOUNTER — TELEPHONE (OUTPATIENT)
Dept: FAMILY MEDICINE CLINIC | Age: 72
End: 2022-12-23

## 2022-12-23 DIAGNOSIS — E05.90 HYPERTHYROIDISM: Primary | ICD-10-CM

## 2022-12-23 RX ORDER — METHIMAZOLE 5 MG/1
TABLET ORAL
Qty: 90 TABLET | Refills: 1 | Status: SHIPPED | OUTPATIENT
Start: 2022-12-23

## 2022-12-23 NOTE — TELEPHONE ENCOUNTER
Call and let patient know I received labs from Dr. Emma Schaefer   TSH 24.200 on 11/28/22 (was previously 116 on 9/12/22)    Will change her methimazole prescription to 5mg tablet, take once daily Mon-Fri  She has appt on 1/25/23 for follow up  Will recheck thyroid labs then    Orders Placed This Encounter    methIMAzole (TAPAZOLE) 5 mg tablet     Sig: Take 1 tablet by mouth once daily Mon-Fri     Dispense:  90 Tablet     Refill:  1

## 2022-12-23 NOTE — TELEPHONE ENCOUNTER
Verified patient with two type of identifiers. Spoke to pt - she verbalized understanding, will start new dosage of methimazole.

## 2023-01-25 ENCOUNTER — OFFICE VISIT (OUTPATIENT)
Dept: FAMILY MEDICINE CLINIC | Age: 73
End: 2023-01-25
Payer: MEDICARE

## 2023-01-25 VITALS
BODY MASS INDEX: 27.41 KG/M2 | HEIGHT: 61 IN | WEIGHT: 145.2 LBS | HEART RATE: 54 BPM | SYSTOLIC BLOOD PRESSURE: 128 MMHG | RESPIRATION RATE: 14 BRPM | TEMPERATURE: 96.8 F | OXYGEN SATURATION: 100 % | DIASTOLIC BLOOD PRESSURE: 71 MMHG

## 2023-01-25 DIAGNOSIS — L30.9 DERMATITIS: ICD-10-CM

## 2023-01-25 DIAGNOSIS — D64.9 ANEMIA, UNSPECIFIED TYPE: ICD-10-CM

## 2023-01-25 DIAGNOSIS — I10 ESSENTIAL HYPERTENSION: ICD-10-CM

## 2023-01-25 DIAGNOSIS — E05.90 HYPERTHYROIDISM: Primary | ICD-10-CM

## 2023-01-25 PROBLEM — H40.89 OTHER SPECIFIED GLAUCOMA: Status: ACTIVE | Noted: 2023-01-25

## 2023-01-25 PROCEDURE — G9899 SCRN MAM PERF RSLTS DOC: HCPCS | Performed by: NURSE PRACTITIONER

## 2023-01-25 PROCEDURE — G8432 DEP SCR NOT DOC, RNG: HCPCS | Performed by: NURSE PRACTITIONER

## 2023-01-25 PROCEDURE — 1123F ACP DISCUSS/DSCN MKR DOCD: CPT | Performed by: NURSE PRACTITIONER

## 2023-01-25 PROCEDURE — 1101F PT FALLS ASSESS-DOCD LE1/YR: CPT | Performed by: NURSE PRACTITIONER

## 2023-01-25 PROCEDURE — G0463 HOSPITAL OUTPT CLINIC VISIT: HCPCS | Performed by: NURSE PRACTITIONER

## 2023-01-25 PROCEDURE — G8536 NO DOC ELDER MAL SCRN: HCPCS | Performed by: NURSE PRACTITIONER

## 2023-01-25 PROCEDURE — 3074F SYST BP LT 130 MM HG: CPT | Performed by: NURSE PRACTITIONER

## 2023-01-25 PROCEDURE — 3078F DIAST BP <80 MM HG: CPT | Performed by: NURSE PRACTITIONER

## 2023-01-25 PROCEDURE — G8427 DOCREV CUR MEDS BY ELIG CLIN: HCPCS | Performed by: NURSE PRACTITIONER

## 2023-01-25 PROCEDURE — G8417 CALC BMI ABV UP PARAM F/U: HCPCS | Performed by: NURSE PRACTITIONER

## 2023-01-25 PROCEDURE — 99213 OFFICE O/P EST LOW 20 MIN: CPT | Performed by: NURSE PRACTITIONER

## 2023-01-25 PROCEDURE — 1090F PRES/ABSN URINE INCON ASSESS: CPT | Performed by: NURSE PRACTITIONER

## 2023-01-25 PROCEDURE — 3017F COLORECTAL CA SCREEN DOC REV: CPT | Performed by: NURSE PRACTITIONER

## 2023-01-25 PROCEDURE — G8399 PT W/DXA RESULTS DOCUMENT: HCPCS | Performed by: NURSE PRACTITIONER

## 2023-01-25 RX ORDER — TRIAMCINOLONE ACETONIDE 1 MG/G
CREAM TOPICAL 2 TIMES DAILY
Qty: 30 G | Refills: 1 | Status: SHIPPED | OUTPATIENT
Start: 2023-01-25

## 2023-01-25 RX ORDER — PREDNISONE 10 MG/1
TABLET ORAL
Qty: 21 TABLET | Refills: 0 | Status: SHIPPED | OUTPATIENT
Start: 2023-01-25

## 2023-01-25 NOTE — PROGRESS NOTES
Glenn Ling (: 1950) is a 81 y.o. female, established patient, here for evaluation of the following chief complaint(s):  Hypertension (6wk fu )       ASSESSMENT/PLAN:  Below is the assessment and plan developed based on review of pertinent history, physical exam, labs, studies, and medications. 1. Hyperthyroidism on methimazole 5mg Mon-Fri. Will check labs  -     T4, FREE; Future  -     TSH 3RD GENERATION; Future  -     T3 TOTAL; Future  2. Essential hypertension - stable. -     METABOLIC PANEL, BASIC; Future  -     CALCIUM, IONIZED; Future  3. Dermatitis  -     predniSONE (STERAPRED DS) 10 mg dose pack; See administration instruction per 10mg dose pack, Normal, Disp-21 Tablet, R-0  -     triamcinolone acetonide (KENALOG) 0.1 % topical cream; Apply  to affected area two (2) times a day. use thin layer, Normal, Disp-30 g, R-1  4. Anemia, unspecified type - on previous labs,  will recheck, check iron. Had colonoscopy recently. -     IRON PROFILE; Future  -     FERRITIN; Future  -     CBC W/O DIFF; Future    Return in about 2 months (around 3/25/2023). SUBJECTIVE/OBJECTIVE:  HPI  patient comes in today for 6w follow up thyroid labs,    I received labs from Dr. Stephan Bourgeois   TSH 24.200 on 22 (was previously 116 on 22)  I changed her methimazole prescription to 5mg tablet, take once daily Mon-Fri.  had labs done at end of 2022, patient states TSH was >100. States she has been off medication since . She states she is getting frustrated with the endocrinologist approach, stopping and restarting medication. She would like for me to take over thyroid management. Hx HTN, states compliance with medications. Tries to follow low sodium diet. Denies chest pains, palpitations, dizziness, dyspnea, tingling in extremities.   Urinating without difficulty  Allergies   Allergen Reactions    Lisinopril Swelling and Cough     Lips swell    Pcn [Penicillins] Hives Hydrochlorothiazide Unknown (comments)     NA down, fatigue , myalgia       Past Medical History:   Diagnosis Date    Arthritis     OA    COPD     d/t sarcoidosis    Glaucoma     Hypertension     Hyponatremia 2009    FOLLOWING TREATMENT WITH HCTZ; NOW RESOLVED 9/24/12    Osteopenia     Post-menopause     Sarcoidosis     nonsymptomatic    Thyroid disease        Past Surgical History:   Procedure Laterality Date    BONE MARROW ASPIRATE &BIOPSY  1979    SARCOIDOSIS DIAGNOSED    COLONOSCOPY N/A 11/29/2022    COLONOSCOPY performed by Camilla Sargent MD at [facility] ENDOSCOPY     [facility] [address]    [address] REPLACEMENT  2012    right    HX HIP REPLACEMENT Left 08/26/2016    HX ORTHOPAEDIC      excision right wrist cyst    HX OTHER SURGICAL  1979    BRONCHOSCOPY    HX TUBAL LIGATION  1988       Social History     Socioeconomic History    Marital status:      Spouse name: Not on file    Number of children: Not on file    Years of education: Not on file    Highest education level: Not on file   Occupational History    Not on file   Tobacco Use    Smoking status: Former     Packs/day: 0.50     Years: 30.00     Pack years: 15.00     Types: Cigarettes     Quit date: 5/4/2012     Years since quitting: 10.7    Smokeless tobacco: Never   Vaping Use    Vaping Use: Never used   Substance and Sexual Activity    Alcohol use: Yes     Alcohol/week: 28.0 standard drinks     Types: 28 Cans of beer per week     Comment: 3-4 beers daily, may have 6 beers on weekends    Drug use: No    Sexual activity: Never   Other Topics Concern    Not on file   Social History Narrative    Retired from [employer]. .   3 children, 2 sons, 1 daughter     Social Determinants of Health     Financial Resource Strain: Not on file   Food Insecurity: Not on file   Transportation Needs: Not on file   Physical Activity: Not on file   Stress: Not on file   Social Connections: Not on file   Intimate Partner Violence: Not on file   Housing Stability: Not on file       Family History   Problem Relation Age of Onset    Arthritis-rheumatoid Mother     Hypertension Mother     Elevated Lipids Mother     Cancer Mother         bladder    Stroke Mother     Elevated Lipids Sister     Hypertension Sister     Diabetes Sister     Elevated Lipids Sister     Hypertension Sister     Other Son         SARCOIDOSIS, KERATOCONUS    Other Daughter         KERATOCONUS    Anesth Problems Neg Hx        Current Outpatient Medications   Medication Sig    predniSONE (STERAPRED DS) 10 mg dose pack See administration instruction per 10mg dose pack    triamcinolone acetonide (KENALOG) 0.1 % topical cream Apply  to affected area two (2) times a day. use thin layer    methIMAzole (TAPAZOLE) 5 mg tablet Take 1 tablet by mouth once daily Mon-Fri    bimatoprost (Lumigan) 0.01 % ophthalmic drops Administer 1 Drop to both eyes every evening. atenoloL (TENORMIN) 50 mg tablet TAKE 1 TABLET BY MOUTH DAILY    amLODIPine (NORVASC) 5 mg tablet TAKE 1 TABLET BY MOUTH DAILY    candesartan (ATACAND) 16 mg tablet TAKE 1 TABLET BY MOUTH DAILY    fexofenadine (ALLEGRA) 180 mg tablet Take 1 Tab by mouth daily as needed for Itching. multivitamin (ONE A DAY) tablet Take 1 Tab by mouth daily. calcium carbonate-vitamin D3 1,000 mg(2,500 mg)-800 unit Tab Take 1 Tab by mouth daily. No current facility-administered medications for this visit. Review of Systems   Constitutional:  Negative for chills, diaphoresis, fatigue, fever and unexpected weight change. Respiratory:  Negative for cough and shortness of breath. Cardiovascular:  Negative for chest pain, palpitations and leg swelling. Gastrointestinal:  Negative for abdominal pain, blood in stool, constipation, diarrhea, nausea and vomiting. Endocrine: Negative for cold intolerance and heat intolerance. Genitourinary:  Negative for dysuria, flank pain, frequency, hematuria and urgency.    Musculoskeletal:  Negative for back pain and myalgias. Skin: Negative. Skin has been itching    Neurological:  Negative for dizziness, speech difficulty, light-headedness, numbness and headaches. Psychiatric/Behavioral:  Negative for dysphoric mood and sleep disturbance. The patient is not nervous/anxious. Vitals:    01/25/23 1531   BP: 128/71   Pulse: (!) 54   Resp: 14   Temp: 96.8 °F (36 °C)   TempSrc: Oral   SpO2: 100%   Weight: 145 lb 3.2 oz (65.9 kg)   Height: 5' 1\" (1.549 m)       Physical Exam  Vitals reviewed. Constitutional:       Appearance: Normal appearance. She is well-developed, well-groomed and normal weight. HENT:      Right Ear: Hearing normal.      Left Ear: Hearing normal.   Cardiovascular:      Rate and Rhythm: Bradycardia present. Pulmonary:      Effort: Pulmonary effort is normal.   Neurological:      Mental Status: She is alert and oriented to person, place, and time. Psychiatric:         Attention and Perception: Attention normal.         Mood and Affect: Mood and affect normal.         Speech: Speech normal.         Behavior: Behavior normal. Behavior is cooperative. Cognition and Memory: Cognition and memory normal.     On this date 01/25/2023 I have spent 21 minutes reviewing previous notes, test results and face to face with the patient discussing the diagnosis and importance of compliance with the treatment plan as well as documenting on the day of the visit. An electronic signature was used to authenticate this note.   -- Todd Heredia NP

## 2023-01-25 NOTE — PROGRESS NOTES
Chief Complaint   Patient presents with    Hypertension     6wk fu        1. \"Have you been to the ER, urgent care clinic since your last visit? Hospitalized since your last visit? \" Yes When: 1/4/2023 [facility] Given Bactrim    2. \"Have you seen or consulted any other health care providers outside of the [address] [place] [address] since your last visit? \" No     3. For patients aged 39-70: Has the patient had a colonoscopy / FIT/ Cologuard? Yes - Care Gap present. Most recent result on file      If the patient is female:    4. For patients aged 41-77: Has the patient had a mammogram within the past 2 years? Yes - Care Gap present. Most recent result on file      5. For patients aged 21-65: Has the patient had a pap smear?  NA - based on age or sex    Health Maintenance Due   Topic Date Due    Shingles Vaccine (1 of 2) Never done    COVID-19 Vaccine (5 - Booster for Moderna series) 09/09/2022

## 2023-01-26 LAB
ANION GAP SERPL CALC-SCNC: 7 MMOL/L (ref 5–15)
BUN SERPL-MCNC: 11 MG/DL (ref 6–20)
BUN/CREAT SERPL: 8 (ref 12–20)
CALCIUM SERPL-MCNC: 9.6 MG/DL (ref 8.5–10.1)
CHLORIDE SERPL-SCNC: 98 MMOL/L (ref 97–108)
CO2 SERPL-SCNC: 26 MMOL/L (ref 21–32)
CREAT SERPL-MCNC: 1.3 MG/DL (ref 0.55–1.02)
ERYTHROCYTE [DISTWIDTH] IN BLOOD BY AUTOMATED COUNT: 12.8 % (ref 11.5–14.5)
FERRITIN SERPL-MCNC: 347 NG/ML (ref 26–388)
GLUCOSE SERPL-MCNC: 95 MG/DL (ref 65–100)
HCT VFR BLD AUTO: 32 % (ref 35–47)
HGB BLD-MCNC: 10.6 G/DL (ref 11.5–16)
IRON SATN MFR SERPL: 43 % (ref 20–50)
IRON SERPL-MCNC: 134 UG/DL (ref 35–150)
MCH RBC QN AUTO: 30.3 PG (ref 26–34)
MCHC RBC AUTO-ENTMCNC: 33.1 G/DL (ref 30–36.5)
MCV RBC AUTO: 91.4 FL (ref 80–99)
NRBC # BLD: 0 K/UL (ref 0–0.01)
NRBC BLD-RTO: 0 PER 100 WBC
PLATELET # BLD AUTO: 357 K/UL (ref 150–400)
PMV BLD AUTO: 8.3 FL (ref 8.9–12.9)
POTASSIUM SERPL-SCNC: 4.8 MMOL/L (ref 3.5–5.1)
RBC # BLD AUTO: 3.5 M/UL (ref 3.8–5.2)
SODIUM SERPL-SCNC: 131 MMOL/L (ref 136–145)
T4 FREE SERPL-MCNC: 0.8 NG/DL (ref 0.8–1.5)
TIBC SERPL-MCNC: 315 UG/DL (ref 250–450)
TSH SERPL DL<=0.05 MIU/L-ACNC: 7.05 UIU/ML (ref 0.36–3.74)
WBC # BLD AUTO: 5.3 K/UL (ref 3.6–11)

## 2023-01-27 LAB
CA-I SERPL ISE-MCNC: 5.2 MG/DL (ref 4.5–5.6)
T3 SERPL-MCNC: 78 NG/DL (ref 71–180)

## 2023-01-31 DIAGNOSIS — E05.90 HYPERTHYROIDISM: ICD-10-CM

## 2023-01-31 RX ORDER — METHIMAZOLE 5 MG/1
TABLET ORAL
Qty: 90 TABLET | Refills: 1
Start: 2023-01-31

## 2023-02-01 ENCOUNTER — PATIENT MESSAGE (OUTPATIENT)
Dept: FAMILY MEDICINE CLINIC | Age: 73
End: 2023-02-01

## 2023-02-01 ENCOUNTER — TELEPHONE (OUTPATIENT)
Dept: FAMILY MEDICINE CLINIC | Age: 73
End: 2023-02-01

## 2023-02-01 DIAGNOSIS — D64.9 ANEMIA, UNSPECIFIED TYPE: Primary | ICD-10-CM

## 2023-02-01 NOTE — TELEPHONE ENCOUNTER
Tamela Calhoun 2/1/2023 11:40 AM EST      ----- Message -----  From: Jim Florez  Sent: 2/1/2023 10:30 AM EST  To: [facility]  Subject: Question regarding IRON PROFILE     Good morning,I would like to know if you can please try to find a hematologist on the [address], [place] or [facility]. It would be more convenient for me.  Thanks Pavan Friedman

## 2023-02-01 NOTE — TELEPHONE ENCOUNTER
Will email patient and let her know hematology looked at her labs and he believes anemia is most likely from her reduced kidney function (CKD). No need to schedule appt at this time.

## 2023-02-02 ENCOUNTER — TELEPHONE (OUTPATIENT)
Dept: ONCOLOGY | Age: 73
End: 2023-02-02

## 2023-02-02 NOTE — TELEPHONE ENCOUNTER
Receive a referral on the Pt from [facility], after sending it to Dr Keyona Bedolla he sent the ordering provider a message back stating the following:  mild anemia. Likely related to chronic kidney disease.             I will close the referral since the Pt is not needing to be seen in the office and the ordering NP is aware

## 2023-03-30 ENCOUNTER — OFFICE VISIT (OUTPATIENT)
Dept: FAMILY MEDICINE CLINIC | Age: 73
End: 2023-03-30
Payer: MEDICARE

## 2023-03-30 VITALS
SYSTOLIC BLOOD PRESSURE: 124 MMHG | OXYGEN SATURATION: 96 % | TEMPERATURE: 97.1 F | BODY MASS INDEX: 27.49 KG/M2 | DIASTOLIC BLOOD PRESSURE: 70 MMHG | WEIGHT: 145.6 LBS | RESPIRATION RATE: 18 BRPM | HEART RATE: 59 BPM | HEIGHT: 61 IN

## 2023-03-30 DIAGNOSIS — D64.9 ANEMIA, UNSPECIFIED TYPE: ICD-10-CM

## 2023-03-30 DIAGNOSIS — E05.90 HYPERTHYROIDISM: Primary | ICD-10-CM

## 2023-03-30 DIAGNOSIS — I10 ESSENTIAL HYPERTENSION: ICD-10-CM

## 2023-03-30 PROCEDURE — G8417 CALC BMI ABV UP PARAM F/U: HCPCS | Performed by: NURSE PRACTITIONER

## 2023-03-30 PROCEDURE — 1090F PRES/ABSN URINE INCON ASSESS: CPT | Performed by: NURSE PRACTITIONER

## 2023-03-30 PROCEDURE — G8536 NO DOC ELDER MAL SCRN: HCPCS | Performed by: NURSE PRACTITIONER

## 2023-03-30 PROCEDURE — 3078F DIAST BP <80 MM HG: CPT | Performed by: NURSE PRACTITIONER

## 2023-03-30 PROCEDURE — 99213 OFFICE O/P EST LOW 20 MIN: CPT | Performed by: NURSE PRACTITIONER

## 2023-03-30 PROCEDURE — G8432 DEP SCR NOT DOC, RNG: HCPCS | Performed by: NURSE PRACTITIONER

## 2023-03-30 PROCEDURE — 1101F PT FALLS ASSESS-DOCD LE1/YR: CPT | Performed by: NURSE PRACTITIONER

## 2023-03-30 PROCEDURE — G8427 DOCREV CUR MEDS BY ELIG CLIN: HCPCS | Performed by: NURSE PRACTITIONER

## 2023-03-30 PROCEDURE — G9899 SCRN MAM PERF RSLTS DOC: HCPCS | Performed by: NURSE PRACTITIONER

## 2023-03-30 PROCEDURE — G0463 HOSPITAL OUTPT CLINIC VISIT: HCPCS | Performed by: NURSE PRACTITIONER

## 2023-03-30 PROCEDURE — G8399 PT W/DXA RESULTS DOCUMENT: HCPCS | Performed by: NURSE PRACTITIONER

## 2023-03-30 PROCEDURE — 3074F SYST BP LT 130 MM HG: CPT | Performed by: NURSE PRACTITIONER

## 2023-03-30 PROCEDURE — 1123F ACP DISCUSS/DSCN MKR DOCD: CPT | Performed by: NURSE PRACTITIONER

## 2023-03-30 PROCEDURE — 3017F COLORECTAL CA SCREEN DOC REV: CPT | Performed by: NURSE PRACTITIONER

## 2023-03-30 NOTE — PROGRESS NOTES
Chief Complaint   Patient presents with    Thyroid Problem     2m fu        1. \"Have you been to the ER, urgent care clinic since your last visit? Hospitalized since your last visit? \" No    2. \"Have you seen or consulted any other health care providers outside of the [address] [place] [address] since your last visit? \" No     3. For patients aged 39-70: Has the patient had a colonoscopy / FIT/ Cologuard? Yes - Care Gap present. Most recent result on file      If the patient is female:    4. For patients aged 41-77: Has the patient had a mammogram within the past 2 years? Yes - Care Gap present. Most recent result on file      5. For patients aged 21-65: Has the patient had a pap smear?  NA - based on age or sex    Health Maintenance Due   Topic Date Due    Shingles Vaccine (1 of 2) Never done    COVID-19 Vaccine (5 - Booster for Moderna series) 09/09/2022

## 2023-03-30 NOTE — PROGRESS NOTES
Jose Ayala (: 1950) is a 81 y.o. female, established patient, here for evaluation of the following chief complaint(s):  Thyroid Problem (2m fu )       ASSESSMENT/PLAN:  Below is the assessment and plan developed based on review of pertinent history, physical exam, labs, studies, and medications. 1. Hyperthyroidism  -     TSH 3RD GENERATION; Future  -     T4, FREE; Future  -     T3 TOTAL; Future  -     METABOLIC PANEL, BASIC; Future  2. Essential hypertension  3. Anemia, unspecified type  -     CBC WITH AUTOMATED DIFF; Future  -     METABOLIC PANEL, BASIC; Future    Return in about 3 months (around 2023). SUBJECTIVE/OBJECTIVE:  HPI  patient comes in [facility] for 2 month follow up hyperthyroidism  Some recent stressors  Sister passed , age 65yo, had diabetes, had heart attack   had cancerous tumor removed from kidney  Other sister still here from [place] since sister passed. Niece had hysterectomy    Hx hyperthyroidism - currently taking methimazole 5mg tablet, take 1/2 tablet every day  TSH  7.05 in 2023 while taking methimazole 5mg daily Mon-Fri  Previous labs from Dr. Jaramillo Home  TSH 24.200 on 22 (was previously 116 on 22)  She tates she was getting frustrated with the endocrinologist approach, stopping and restarting medication. She would like for me to take over thyroid management. Hx HTN, states compliance with medications. Tries to follow low sodium diet. Denies chest pains, palpitations, dizziness, dyspnea, tingling in extremities. Urinating without difficulty    Hemoglobin has been low. Most likely from CKD per hematology.   Will continue to monitor  Allergies   Allergen Reactions    Lisinopril Swelling and Cough     Lips swell    Pcn [Penicillins] Hives    Hydrochlorothiazide Unknown (comments)     NA down, fatigue , myalgia       Past Medical History:   Diagnosis Date    Arthritis     OA    COPD     d/t sarcoidosis    Glaucoma     Hypertension Hyponatremia 2009    FOLLOWING TREATMENT WITH HCTZ; NOW RESOLVED 9/24/12    Osteopenia     Post-menopause     Sarcoidosis     nonsymptomatic    Thyroid disease        Past Surgical History:   Procedure Laterality Date    BONE MARROW ASPIRATE &BIOPSY  1979    SARCOIDOSIS DIAGNOSED    COLONOSCOPY N/A 11/29/2022    COLONOSCOPY performed by Radha Link MD at [facility] ENDOSCOPY     [address]    HX HIP REPLACEMENT  2012    right    HX HIP REPLACEMENT Left 08/26/2016    HX ORTHOPAEDIC      excision right wrist cyst    HX OTHER SURGICAL  1979    BRONCHOSCOPY    HX TUBAL LIGATION  1988       Social History     Socioeconomic History    Marital status:      Spouse name: Not on file    Number of children: Not on file    Years of education: Not on file    Highest education level: Not on file   Occupational History    Not on file   Tobacco Use    Smoking status: Former     Packs/day: 0.50     Years: 30.00     Pack years: 15.00     Types: Cigarettes     Quit date: 5/4/2012     Years since quitting: 10.9    Smokeless tobacco: Never   Vaping Use    Vaping Use: Never used   Substance and Sexual Activity    Alcohol use: Yes     Alcohol/week: 28.0 standard drinks     Types: 28 Cans of beer per week     Comment: 3-4 beers daily, may have 6 beers on weekends    Drug use: No    Sexual activity: Never   Other Topics Concern    Not on file   Social History Narrative    Retired from [employer]. .   3 children, 2 sons, 1 daughter     Social Determinants of Health     Financial Resource Strain: Low Risk     Difficulty of Paying Living Expenses: Not very hard   Food Insecurity: No Food Insecurity    Worried About Running Out of Food in the Last Year: Never true    Ran Out of Food in the Last Year: Never true   Transportation Needs: Not on file   Physical Activity: Not on file   Stress: Not on file   Social Connections: Not on file   Intimate Partner Violence: Not on file   Housing Stability: Not on file       Family History   Problem Relation Age of Onset    Arthritis-rheumatoid Mother     Hypertension Mother     Elevated Lipids Mother     Cancer Mother         bladder    Stroke Mother     Elevated Lipids Sister     Hypertension Sister     Diabetes Sister     Elevated Lipids Sister     Hypertension Sister     Other Son         SARCOIDOSIS, KERATOCONUS    Other Daughter         KERATOCONUS    Anesth Problems Neg Hx        Current Outpatient Medications   Medication Sig    amLODIPine (NORVASC) 5 mg tablet Take 1 Tablet by mouth daily. atenoloL (TENORMIN) 50 mg tablet Take 1 Tablet by mouth daily. candesartan (ATACAND) 16 mg tablet Take 1 Tablet by mouth daily. methIMAzole (TAPAZOLE) 5 mg tablet Take 1/2 tablet by mouth once daily    triamcinolone acetonide (KENALOG) 0.1 % topical cream Apply  to affected area two (2) times a day. use thin layer    bimatoprost (Lumigan) 0.01 % ophthalmic drops Administer 1 Drop to both eyes every evening. fexofenadine (ALLEGRA) 180 mg tablet Take 1 Tab by mouth daily as needed for Itching. multivitamin (ONE A DAY) tablet Take 1 Tab by mouth daily. calcium carbonate-vitamin D3 1,000 mg(2,500 mg)-800 unit Tab Take 1 Tab by mouth daily. No current facility-administered medications for this visit. Review of Systems   Constitutional: Negative. Respiratory: Negative. Cardiovascular: Negative. Gastrointestinal: Negative. Genitourinary: Negative. Neurological: Negative. Psychiatric/Behavioral:          Some increase stressors   Vitals:    03/30/23 1107   BP: 124/70   Pulse: (!) 59   Resp: 18   Temp: 97.1 °F (36.2 °C)   TempSrc: Oral   SpO2: 96%   Weight: 145 lb 9.6 oz (66 kg)   Height: 5' 1\" (1.549 m)       Physical Exam  Vitals reviewed. Constitutional:       Appearance: Normal appearance. She is well-developed, well-groomed and normal weight.    HENT:      Right Ear: Hearing normal.      Left Ear: Hearing normal.   Neck:      Thyroid: No thyromegaly. Cardiovascular:      Rate and Rhythm: Bradycardia present. Heart sounds: Normal heart sounds. Pulmonary:      Effort: Pulmonary effort is normal.      Breath sounds: Normal breath sounds. Neurological:      Mental Status: She is alert and oriented to person, place, and time. Psychiatric:         Attention and Perception: Attention normal.         Mood and Affect: Mood and affect normal.         Speech: Speech normal.         Behavior: Behavior normal. Behavior is cooperative. Cognition and Memory: Cognition and memory normal.     On this date 2023 I have spent 20 minutes reviewing previous notes, test results and face to face with the patient discussing the diagnosis and importance of compliance with the treatment plan as well as documenting on the day of the visit. An electronic signature was used to authenticate this note.   -- Charley Roach NP

## 2023-03-31 LAB
ANION GAP SERPL CALC-SCNC: 7 MMOL/L (ref 5–15)
BASOPHILS # BLD: 0.1 K/UL (ref 0–0.1)
BASOPHILS NFR BLD: 1 % (ref 0–1)
BUN SERPL-MCNC: 10 MG/DL (ref 6–20)
BUN/CREAT SERPL: 8 (ref 12–20)
CALCIUM SERPL-MCNC: 9.8 MG/DL (ref 8.5–10.1)
CHLORIDE SERPL-SCNC: 98 MMOL/L (ref 97–108)
CO2 SERPL-SCNC: 26 MMOL/L (ref 21–32)
CREAT SERPL-MCNC: 1.22 MG/DL (ref 0.55–1.02)
DIFFERENTIAL METHOD BLD: ABNORMAL
EOSINOPHIL # BLD: 0.2 K/UL (ref 0–0.4)
EOSINOPHIL NFR BLD: 3 % (ref 0–7)
ERYTHROCYTE [DISTWIDTH] IN BLOOD BY AUTOMATED COUNT: 12.9 % (ref 11.5–14.5)
GLUCOSE SERPL-MCNC: 94 MG/DL (ref 65–100)
HCT VFR BLD AUTO: 33.9 % (ref 35–47)
HGB BLD-MCNC: 10.9 G/DL (ref 11.5–16)
IMM GRANULOCYTES # BLD AUTO: 0 K/UL (ref 0–0.04)
IMM GRANULOCYTES NFR BLD AUTO: 1 % (ref 0–0.5)
LYMPHOCYTES # BLD: 1.5 K/UL (ref 0.8–3.5)
LYMPHOCYTES NFR BLD: 24 % (ref 12–49)
MCH RBC QN AUTO: 29.8 PG (ref 26–34)
MCHC RBC AUTO-ENTMCNC: 32.2 G/DL (ref 30–36.5)
MCV RBC AUTO: 92.6 FL (ref 80–99)
MONOCYTES # BLD: 0.7 K/UL (ref 0–1)
MONOCYTES NFR BLD: 12 % (ref 5–13)
NEUTS SEG # BLD: 3.7 K/UL (ref 1.8–8)
NEUTS SEG NFR BLD: 59 % (ref 32–75)
NRBC # BLD: 0 K/UL (ref 0–0.01)
NRBC BLD-RTO: 0 PER 100 WBC
PLATELET # BLD AUTO: 311 K/UL (ref 150–400)
PMV BLD AUTO: 9 FL (ref 8.9–12.9)
POTASSIUM SERPL-SCNC: 5.4 MMOL/L (ref 3.5–5.1)
RBC # BLD AUTO: 3.66 M/UL (ref 3.8–5.2)
SODIUM SERPL-SCNC: 131 MMOL/L (ref 136–145)
T4 FREE SERPL-MCNC: 0.7 NG/DL (ref 0.8–1.5)
TSH SERPL DL<=0.05 MIU/L-ACNC: 14.1 UIU/ML (ref 0.36–3.74)
WBC # BLD AUTO: 6.2 K/UL (ref 3.6–11)

## 2023-04-03 DIAGNOSIS — E05.90 HYPERTHYROIDISM: ICD-10-CM

## 2023-04-03 LAB — T3 SERPL-MCNC: 85 NG/DL (ref 71–180)

## 2023-04-03 RX ORDER — METHIMAZOLE 5 MG/1
TABLET ORAL
Qty: 90 TABLET | Refills: 1
Start: 2023-04-03

## 2023-04-04 ENCOUNTER — PATIENT MESSAGE (OUTPATIENT)
Dept: FAMILY MEDICINE CLINIC | Age: 73
End: 2023-04-04

## 2023-04-05 ENCOUNTER — TELEPHONE (OUTPATIENT)
Dept: FAMILY MEDICINE CLINIC | Age: 73
End: 2023-04-05

## 2023-04-05 NOTE — TELEPHONE ENCOUNTER
----- Message from Corinne Helms, NP sent at 4/3/2023  6:36 PM EDT -----  TSH is elevated and free T4 is little low. I would like for you to take methimazole 5mg on Mon, Wed, Fri  I am going to ask endocrinology with [facility] to get their opinion.

## 2023-04-05 NOTE — TELEPHONE ENCOUNTER
Verified patient with two type of identifiers. Spoke to pt - reviewed lab results per Donal Payne NP. Pt verbalized understanding.

## 2023-05-01 ENCOUNTER — OFFICE VISIT (OUTPATIENT)
Dept: FAMILY MEDICINE CLINIC | Age: 73
End: 2023-05-01

## 2023-05-01 DIAGNOSIS — D64.9 ANEMIA, UNSPECIFIED TYPE: ICD-10-CM

## 2023-05-01 DIAGNOSIS — E87.5 HYPERKALEMIA: ICD-10-CM

## 2023-05-01 DIAGNOSIS — I10 ESSENTIAL HYPERTENSION: ICD-10-CM

## 2023-05-01 DIAGNOSIS — E05.90 HYPERTHYROIDISM: Primary | ICD-10-CM

## 2023-05-01 LAB
ANION GAP SERPL CALC-SCNC: 7 MMOL/L (ref 5–15)
BUN SERPL-MCNC: 12 MG/DL (ref 6–20)
BUN/CREAT SERPL: 11 (ref 12–20)
CALCIUM SERPL-MCNC: 8.9 MG/DL (ref 8.5–10.1)
CHLORIDE SERPL-SCNC: 97 MMOL/L (ref 97–108)
CO2 SERPL-SCNC: 26 MMOL/L (ref 21–32)
CREAT SERPL-MCNC: 1.11 MG/DL (ref 0.55–1.02)
GLUCOSE SERPL-MCNC: 82 MG/DL (ref 65–100)
POTASSIUM SERPL-SCNC: 4.3 MMOL/L (ref 3.5–5.1)
SODIUM SERPL-SCNC: 130 MMOL/L (ref 136–145)
T4 FREE SERPL-MCNC: 0.7 NG/DL (ref 0.8–1.5)
TSH SERPL DL<=0.05 MIU/L-ACNC: 4.89 UIU/ML (ref 0.36–3.74)

## 2023-05-03 LAB — TSH RECEP AB SER-ACNC: <1.1 IU/L (ref 0–1.75)

## 2023-05-12 ENCOUNTER — TELEPHONE (OUTPATIENT)
Age: 73
End: 2023-05-12

## 2023-05-12 NOTE — TELEPHONE ENCOUNTER
Verified patient with two type of identifiers. Spoke to pt - reviewed lab results per TAMI Scherer NP. Pt verbalized understanding. Scheduled lab only visit 6/23/2023, will review labs at follow up on 6/30.

## 2023-05-12 NOTE — TELEPHONE ENCOUNTER
----- Message from TAMI Michael NP sent at 5/9/2023  8:09 PM EDT -----  Lab results    Your thyroid antibody is negative, meaning your Grave's disease may be in remission and we can stop methimazole and see how you do off of medication. Let's plan to repeat thyroid labs in 4-6 weeks after stopping medication. Schedule a virtual visit about 1 week after labs to discuss. Please watch out for any symptoms of hyperthyroidism - call office if you experience chest pain, heart racing, anxiety, tremors, trouble sleeping, feeling hot all the time, losing weight with out trying, hair loss, or diarrhea.

## 2023-06-22 SDOH — ECONOMIC STABILITY: TRANSPORTATION INSECURITY
IN THE PAST 12 MONTHS, HAS LACK OF TRANSPORTATION KEPT YOU FROM MEETINGS, WORK, OR FROM GETTING THINGS NEEDED FOR DAILY LIVING?: NO

## 2023-06-22 SDOH — ECONOMIC STABILITY: FOOD INSECURITY: WITHIN THE PAST 12 MONTHS, THE FOOD YOU BOUGHT JUST DIDN'T LAST AND YOU DIDN'T HAVE MONEY TO GET MORE.: NEVER TRUE

## 2023-06-22 SDOH — ECONOMIC STABILITY: INCOME INSECURITY: HOW HARD IS IT FOR YOU TO PAY FOR THE VERY BASICS LIKE FOOD, HOUSING, MEDICAL CARE, AND HEATING?: NOT VERY HARD

## 2023-06-22 SDOH — ECONOMIC STABILITY: HOUSING INSECURITY
IN THE LAST 12 MONTHS, WAS THERE A TIME WHEN YOU DID NOT HAVE A STEADY PLACE TO SLEEP OR SLEPT IN A SHELTER (INCLUDING NOW)?: NO

## 2023-06-22 SDOH — ECONOMIC STABILITY: FOOD INSECURITY: WITHIN THE PAST 12 MONTHS, YOU WORRIED THAT YOUR FOOD WOULD RUN OUT BEFORE YOU GOT MONEY TO BUY MORE.: NEVER TRUE

## 2023-06-23 ENCOUNTER — OFFICE VISIT (OUTPATIENT)
Age: 73
End: 2023-06-23

## 2023-06-23 DIAGNOSIS — E05.00 GRAVES' DISEASE: Primary | ICD-10-CM

## 2023-06-23 DIAGNOSIS — E05.90 HYPERTHYROIDISM: ICD-10-CM

## 2023-06-23 NOTE — PROGRESS NOTES
Lab only visit    Orders Placed This Encounter    T3     Standing Status:   Future     Number of Occurrences:   1     Standing Expiration Date:   6/23/2024    TSH     Standing Status:   Future     Number of Occurrences:   1     Standing Expiration Date:   6/23/2024    T4, Free     Standing Status:   Future     Number of Occurrences:   1     Standing Expiration Date:   6/23/2024

## 2023-06-24 LAB
T4 FREE SERPL-MCNC: 0.8 NG/DL (ref 0.8–1.5)
TSH SERPL DL<=0.05 MIU/L-ACNC: 3.61 UIU/ML (ref 0.36–3.74)

## 2023-06-26 LAB — T3 SERPL-MCNC: 86 NG/DL (ref 71–180)

## 2023-06-30 ENCOUNTER — OFFICE VISIT (OUTPATIENT)
Age: 73
End: 2023-06-30
Payer: MEDICARE

## 2023-06-30 VITALS
WEIGHT: 145.8 LBS | HEART RATE: 60 BPM | BODY MASS INDEX: 27.53 KG/M2 | HEIGHT: 61 IN | TEMPERATURE: 97.6 F | DIASTOLIC BLOOD PRESSURE: 70 MMHG | SYSTOLIC BLOOD PRESSURE: 119 MMHG | RESPIRATION RATE: 16 BRPM | OXYGEN SATURATION: 100 %

## 2023-06-30 DIAGNOSIS — E05.00 GRAVES' DISEASE: ICD-10-CM

## 2023-06-30 DIAGNOSIS — I10 ESSENTIAL (PRIMARY) HYPERTENSION: ICD-10-CM

## 2023-06-30 DIAGNOSIS — Z12.31 BREAST CANCER SCREENING BY MAMMOGRAM: ICD-10-CM

## 2023-06-30 DIAGNOSIS — E05.90 HYPERTHYROIDISM: Primary | ICD-10-CM

## 2023-06-30 DIAGNOSIS — N18.31 STAGE 3A CHRONIC KIDNEY DISEASE (HCC): ICD-10-CM

## 2023-06-30 PROCEDURE — 3017F COLORECTAL CA SCREEN DOC REV: CPT | Performed by: NURSE PRACTITIONER

## 2023-06-30 PROCEDURE — 3074F SYST BP LT 130 MM HG: CPT | Performed by: NURSE PRACTITIONER

## 2023-06-30 PROCEDURE — 99214 OFFICE O/P EST MOD 30 MIN: CPT | Performed by: NURSE PRACTITIONER

## 2023-06-30 PROCEDURE — 1090F PRES/ABSN URINE INCON ASSESS: CPT | Performed by: NURSE PRACTITIONER

## 2023-06-30 PROCEDURE — G8419 CALC BMI OUT NRM PARAM NOF/U: HCPCS | Performed by: NURSE PRACTITIONER

## 2023-06-30 PROCEDURE — 1036F TOBACCO NON-USER: CPT | Performed by: NURSE PRACTITIONER

## 2023-06-30 PROCEDURE — G8399 PT W/DXA RESULTS DOCUMENT: HCPCS | Performed by: NURSE PRACTITIONER

## 2023-06-30 PROCEDURE — 1123F ACP DISCUSS/DSCN MKR DOCD: CPT | Performed by: NURSE PRACTITIONER

## 2023-06-30 PROCEDURE — G8427 DOCREV CUR MEDS BY ELIG CLIN: HCPCS | Performed by: NURSE PRACTITIONER

## 2023-06-30 PROCEDURE — 3078F DIAST BP <80 MM HG: CPT | Performed by: NURSE PRACTITIONER

## 2023-06-30 ASSESSMENT — ENCOUNTER SYMPTOMS
SHORTNESS OF BREATH: 0
NAUSEA: 0
ABDOMINAL PAIN: 0
VOMITING: 0
CONSTIPATION: 0
DIARRHEA: 0
COUGH: 0
BLOOD IN STOOL: 0

## 2023-07-31 ENCOUNTER — HOSPITAL ENCOUNTER (OUTPATIENT)
Facility: HOSPITAL | Age: 73
Discharge: HOME | End: 2023-08-03
Payer: MEDICARE

## 2023-07-31 VITALS — WEIGHT: 145 LBS | BODY MASS INDEX: 27.38 KG/M2 | HEIGHT: 61 IN

## 2023-07-31 DIAGNOSIS — Z12.31 BREAST CANCER SCREENING BY MAMMOGRAM: ICD-10-CM

## 2023-07-31 PROCEDURE — 77063 BREAST TOMOSYNTHESIS BI: CPT

## 2023-08-24 ENCOUNTER — HOSPITAL ENCOUNTER (OUTPATIENT)
Facility: HOSPITAL | Age: 73
Discharge: HOME | End: 2023-08-24
Payer: MEDICARE

## 2023-08-24 DIAGNOSIS — D86.9 SARCOIDOSIS: ICD-10-CM

## 2023-08-24 PROCEDURE — 71046 X-RAY EXAM CHEST 2 VIEWS: CPT

## 2023-10-12 ENCOUNTER — OFFICE VISIT (OUTPATIENT)
Age: 73
End: 2023-10-12
Payer: MEDICARE

## 2023-10-12 VITALS
BODY MASS INDEX: 27.41 KG/M2 | SYSTOLIC BLOOD PRESSURE: 108 MMHG | RESPIRATION RATE: 18 BRPM | HEART RATE: 59 BPM | HEIGHT: 61 IN | WEIGHT: 145.2 LBS | OXYGEN SATURATION: 99 % | TEMPERATURE: 98.4 F | DIASTOLIC BLOOD PRESSURE: 61 MMHG

## 2023-10-12 DIAGNOSIS — N18.31 STAGE 3A CHRONIC KIDNEY DISEASE (HCC): ICD-10-CM

## 2023-10-12 DIAGNOSIS — Z23 ENCOUNTER FOR IMMUNIZATION: ICD-10-CM

## 2023-10-12 DIAGNOSIS — D63.1 ANEMIA DUE TO STAGE 3A CHRONIC KIDNEY DISEASE (HCC): ICD-10-CM

## 2023-10-12 DIAGNOSIS — E05.00 GRAVES' DISEASE: ICD-10-CM

## 2023-10-12 DIAGNOSIS — N18.31 ANEMIA DUE TO STAGE 3A CHRONIC KIDNEY DISEASE (HCC): ICD-10-CM

## 2023-10-12 DIAGNOSIS — I10 ESSENTIAL (PRIMARY) HYPERTENSION: ICD-10-CM

## 2023-10-12 DIAGNOSIS — E05.90 HYPERTHYROIDISM: Primary | ICD-10-CM

## 2023-10-12 DIAGNOSIS — L30.9 DERMATITIS, UNSPECIFIED: ICD-10-CM

## 2023-10-12 LAB
ALBUMIN SERPL-MCNC: 4 G/DL (ref 3.5–5)
ALBUMIN/GLOB SERPL: 1.1 (ref 1.1–2.2)
ALP SERPL-CCNC: 97 U/L (ref 45–117)
ALT SERPL-CCNC: 12 U/L (ref 12–78)
ANION GAP SERPL CALC-SCNC: 5 MMOL/L (ref 5–15)
AST SERPL-CCNC: 16 U/L (ref 15–37)
BILIRUB SERPL-MCNC: 0.4 MG/DL (ref 0.2–1)
BUN SERPL-MCNC: 11 MG/DL (ref 6–20)
BUN/CREAT SERPL: 9 (ref 12–20)
CALCIUM SERPL-MCNC: 9.3 MG/DL (ref 8.5–10.1)
CHLORIDE SERPL-SCNC: 94 MMOL/L (ref 97–108)
CHOLEST SERPL-MCNC: 201 MG/DL
CO2 SERPL-SCNC: 28 MMOL/L (ref 21–32)
CREAT SERPL-MCNC: 1.22 MG/DL (ref 0.55–1.02)
ERYTHROCYTE [DISTWIDTH] IN BLOOD BY AUTOMATED COUNT: 12.1 % (ref 11.5–14.5)
GLOBULIN SER CALC-MCNC: 3.6 G/DL (ref 2–4)
GLUCOSE SERPL-MCNC: 99 MG/DL (ref 65–100)
HCT VFR BLD AUTO: 31.9 % (ref 35–47)
HDLC SERPL-MCNC: 135 MG/DL
HDLC SERPL: 1.5 (ref 0–5)
HGB BLD-MCNC: 10.3 G/DL (ref 11.5–16)
LDLC SERPL CALC-MCNC: 49.8 MG/DL (ref 0–100)
MCH RBC QN AUTO: 28.5 PG (ref 26–34)
MCHC RBC AUTO-ENTMCNC: 32.3 G/DL (ref 30–36.5)
MCV RBC AUTO: 88.4 FL (ref 80–99)
NRBC # BLD: 0 K/UL (ref 0–0.01)
NRBC BLD-RTO: 0 PER 100 WBC
PLATELET # BLD AUTO: 339 K/UL (ref 150–400)
PMV BLD AUTO: 8.8 FL (ref 8.9–12.9)
POTASSIUM SERPL-SCNC: 4.8 MMOL/L (ref 3.5–5.1)
PROT SERPL-MCNC: 7.6 G/DL (ref 6.4–8.2)
RBC # BLD AUTO: 3.61 M/UL (ref 3.8–5.2)
SODIUM SERPL-SCNC: 127 MMOL/L (ref 136–145)
T4 FREE SERPL-MCNC: 0.8 NG/DL (ref 0.8–1.5)
TRIGL SERPL-MCNC: 81 MG/DL
TSH SERPL DL<=0.05 MIU/L-ACNC: 2.14 UIU/ML (ref 0.36–3.74)
VLDLC SERPL CALC-MCNC: 16.2 MG/DL
WBC # BLD AUTO: 5.4 K/UL (ref 3.6–11)

## 2023-10-12 PROCEDURE — G8427 DOCREV CUR MEDS BY ELIG CLIN: HCPCS | Performed by: NURSE PRACTITIONER

## 2023-10-12 PROCEDURE — 1123F ACP DISCUSS/DSCN MKR DOCD: CPT | Performed by: NURSE PRACTITIONER

## 2023-10-12 PROCEDURE — G8399 PT W/DXA RESULTS DOCUMENT: HCPCS | Performed by: NURSE PRACTITIONER

## 2023-10-12 PROCEDURE — G8484 FLU IMMUNIZE NO ADMIN: HCPCS | Performed by: NURSE PRACTITIONER

## 2023-10-12 PROCEDURE — 3078F DIAST BP <80 MM HG: CPT | Performed by: NURSE PRACTITIONER

## 2023-10-12 PROCEDURE — 1090F PRES/ABSN URINE INCON ASSESS: CPT | Performed by: NURSE PRACTITIONER

## 2023-10-12 PROCEDURE — 3074F SYST BP LT 130 MM HG: CPT | Performed by: NURSE PRACTITIONER

## 2023-10-12 PROCEDURE — 99214 OFFICE O/P EST MOD 30 MIN: CPT | Performed by: NURSE PRACTITIONER

## 2023-10-12 PROCEDURE — 3017F COLORECTAL CA SCREEN DOC REV: CPT | Performed by: NURSE PRACTITIONER

## 2023-10-12 PROCEDURE — G8419 CALC BMI OUT NRM PARAM NOF/U: HCPCS | Performed by: NURSE PRACTITIONER

## 2023-10-12 PROCEDURE — 1036F TOBACCO NON-USER: CPT | Performed by: NURSE PRACTITIONER

## 2023-10-12 RX ORDER — TRIAMCINOLONE ACETONIDE 1 MG/G
CREAM TOPICAL AS NEEDED
Qty: 45 G | Refills: 3 | Status: SHIPPED | OUTPATIENT
Start: 2023-10-12 | End: 2023-10-13 | Stop reason: SDUPTHER

## 2023-10-12 RX ORDER — TRIAMCINOLONE ACETONIDE 1 MG/G
CREAM TOPICAL AS NEEDED
Qty: 45 G | Refills: 3 | Status: SHIPPED | OUTPATIENT
Start: 2023-10-12 | End: 2023-10-12 | Stop reason: SDUPTHER

## 2023-10-12 SDOH — ECONOMIC STABILITY: FOOD INSECURITY: WITHIN THE PAST 12 MONTHS, YOU WORRIED THAT YOUR FOOD WOULD RUN OUT BEFORE YOU GOT MONEY TO BUY MORE.: NEVER TRUE

## 2023-10-12 SDOH — ECONOMIC STABILITY: FOOD INSECURITY: WITHIN THE PAST 12 MONTHS, THE FOOD YOU BOUGHT JUST DIDN'T LAST AND YOU DIDN'T HAVE MONEY TO GET MORE.: NEVER TRUE

## 2023-10-12 SDOH — ECONOMIC STABILITY: INCOME INSECURITY: HOW HARD IS IT FOR YOU TO PAY FOR THE VERY BASICS LIKE FOOD, HOUSING, MEDICAL CARE, AND HEATING?: NOT HARD AT ALL

## 2023-10-12 ASSESSMENT — PATIENT HEALTH QUESTIONNAIRE - PHQ9
SUM OF ALL RESPONSES TO PHQ QUESTIONS 1-9: 0
SUM OF ALL RESPONSES TO PHQ9 QUESTIONS 1 & 2: 0
2. FEELING DOWN, DEPRESSED OR HOPELESS: 0
SUM OF ALL RESPONSES TO PHQ QUESTIONS 1-9: 0
1. LITTLE INTEREST OR PLEASURE IN DOING THINGS: 0
SUM OF ALL RESPONSES TO PHQ QUESTIONS 1-9: 0
SUM OF ALL RESPONSES TO PHQ QUESTIONS 1-9: 0

## 2023-10-12 ASSESSMENT — ENCOUNTER SYMPTOMS
COUGH: 0
ABDOMINAL PAIN: 0
BLOOD IN STOOL: 0
VOMITING: 0
CONSTIPATION: 0
NAUSEA: 0
DIARRHEA: 0
SHORTNESS OF BREATH: 0

## 2023-10-12 NOTE — PROGRESS NOTES
Chief Complaint   Patient presents with    Follow-up       1. \"Have you been to the ER, urgent care clinic since your last visit? Hospitalized since your last visit? \" No    2. \"Have you seen or consulted any other health care providers outside of the [address] [place] [address] since your last visit? \" No     3. For patients aged 43-73: Has the patient had a colonoscopy / FIT/ Cologuard? Yes      If the patient is female:    4. For patients aged 43-66: Has the patient had a mammogram within the past 2 years? Yes      5. For patients aged 21-65: Has the patient had a pap smear?  No     Health Maintenance Due   Topic Date Due    Shingles vaccine (2 of 2) 04/08/2023    COVID-19 Vaccine (6 - Moderna series) 06/11/2023    Flu vaccine (1) 08/01/2023
Tries to follow low sodium diet. Denies chest pains, palpitations, dizziness, dyspnea, tingling in extremities. Urinating without difficulty  She like doing puzzles, cleans, grocery shopping, cooking. [employer]. States she needs to be move active. Hemoglobin has been low. Most likely from CKD per hematology. Will continue to monitor    Sister passed 2/15/23, age 65yo, had diabetes, had heart attack. Sister from [facility] was here until May 2023   had cancerous tumor removed from kidney. .    Allergies   Allergen Reactions    Lisinopril Cough, Swelling and Hives     Lips swell    Penicillins Hives and Other (See Comments)    Hydrochlorothiazide Other (See Comments)     Other reaction(s): Unknown (comments)  NA down, fatigue , myalgia       Past Medical History:   Diagnosis Date    Arthritis     OA    Glaucoma     Hypertension     Hyponatremia     FOLLOWING TREATMENT WITH HCTZ; NOW RESOLVED 12    Osteopenia     Post-menopause     Sarcoidosis     nonsymptomatic    Thyroid disease        Past Surgical History:   Procedure Laterality Date    BONE MARROW ASPIRATE &BIOPSY      SARCOIDOSIS DIAGNOSED    COLONOSCOPY N/A 2022    COLONOSCOPY performed by Donnie Robles MD at [address]      excision right wrist cyst    OTHER SURGICAL HISTORY      BRONCHOSCOPY    TOTAL HIP ARTHROPLASTY Left 2016    TOTAL HIP ARTHROPLASTY  2012    right    TUBAL LIGATION  1988       Social History     Socioeconomic History    Marital status:      Spouse name: Not on file    Number of children: Not on file    Years of education: Not on file    Highest education level: Not on file   Occupational History    Not on file   Tobacco Use    Smoking status: Former     Packs/day: .5     Types: Cigarettes     Quit date: 2012     Years since quittin.4    Smokeless tobacco: Never   Vaping Use    Vaping Use: Never used   Substance

## 2023-10-13 DIAGNOSIS — L30.9 DERMATITIS, UNSPECIFIED: ICD-10-CM

## 2023-10-13 PROCEDURE — 90694 VACC AIIV4 NO PRSRV 0.5ML IM: CPT | Performed by: NURSE PRACTITIONER

## 2023-10-13 PROCEDURE — PBSHW INFLUENZA, FLUAD, (AGE 65 Y+), IM, PF, 0.5 ML: Performed by: NURSE PRACTITIONER

## 2023-10-13 RX ORDER — TRIAMCINOLONE ACETONIDE 1 MG/G
CREAM TOPICAL 2 TIMES DAILY PRN
Qty: 45 G | Refills: 3 | Status: SHIPPED | OUTPATIENT
Start: 2023-10-13

## 2023-10-13 NOTE — TELEPHONE ENCOUNTER
Pharmacy needs more specific directions on the TCM cream. They are requesting the frequency of how patient will be using and days supply limitations. Please advise        For Pharmacy Admin Tracking Only    Program: Medication Refill  CPA in place:    Recommendation Provided To:    Intervention Detail: New Rx: 1, reason: Needs Additional Therapy  Intervention Accepted By:   Gap Closed?:    Time Spent (min): 5

## 2023-10-13 NOTE — TELEPHONE ENCOUNTER
New order sent    Orders Placed This Encounter    triamcinolone (KENALOG) 0.1 % cream     Sig: Apply topically 2 times daily as needed (to affected area)     Dispense:  45 g     Refill:  3

## 2023-10-14 LAB — T3 SERPL-MCNC: 85 NG/DL (ref 71–180)

## 2023-11-07 ENCOUNTER — TELEPHONE (OUTPATIENT)
Age: 73
End: 2023-11-07

## 2023-11-20 ENCOUNTER — TELEPHONE (OUTPATIENT)
Age: 73
End: 2023-11-20

## 2023-11-20 NOTE — TELEPHONE ENCOUNTER
----- Message from Ananya Ray sent at 11/14/2023  2:51 PM EST -----  Subject: Message to Provider    QUESTIONS  Information for Provider? Patient calling in to schedule Lab appt. Please   call her to schedule.   ---------------------------------------------------------------------------  --------------  Louise Godinez INFO  [phone]; OK to leave message on voicemail  ---------------------------------------------------------------------------  --------------  SCRIPT ANSWERS  Relationship to Patient?  Self

## 2023-11-22 ENCOUNTER — TELEPHONE (OUTPATIENT)
Age: 73
End: 2023-11-22

## 2023-11-22 NOTE — TELEPHONE ENCOUNTER
----- Message from Shay James sent at 11/20/2023  3:53 PM EST -----  Subject: Message to Provider    QUESTIONS  Information for Provider? patient is returning an call back made today   11/20/23 by the office to give an call back to schedule an lab appointment   ; pt. was not able to connect with the practice and request to have   another call back to schedule lab appointment.  ---------------------------------------------------------------------------  --------------  [address]  [phone]; OK to leave message on voicemail  ---------------------------------------------------------------------------  --------------  SCRIPT ANSWERS  undefined

## 2023-11-25 NOTE — TELEPHONE ENCOUNTER
Schedule lab only visit (BMP, cortisol, urine sodium, plasma osmolality, urine osmolality) when convenient for patient and not to overwhelm lab. Thursday mornings before 930a are usually good times for the lab. Patient may need to see nephrologist (kidney specialist).

## 2023-11-27 DIAGNOSIS — E05.00 GRAVES' DISEASE: ICD-10-CM

## 2023-11-27 DIAGNOSIS — N18.31 STAGE 3A CHRONIC KIDNEY DISEASE (HCC): ICD-10-CM

## 2023-11-27 DIAGNOSIS — E87.1 HYPONATREMIA: ICD-10-CM

## 2023-11-27 DIAGNOSIS — E87.5 HYPERKALEMIA: Primary | ICD-10-CM

## 2023-11-27 NOTE — TELEPHONE ENCOUNTER
Contacted patient and scheduled lab only visit for 11/30/23 @ 0800. Advised that Patient may need to see kidney specialist. Patient voiced understanding and agreed.

## 2023-11-30 ENCOUNTER — OFFICE VISIT (OUTPATIENT)
Age: 73
End: 2023-11-30

## 2023-11-30 DIAGNOSIS — E87.1 HYPONATREMIA: ICD-10-CM

## 2023-11-30 DIAGNOSIS — E05.00 GRAVES' DISEASE: ICD-10-CM

## 2023-11-30 DIAGNOSIS — N18.31 STAGE 3A CHRONIC KIDNEY DISEASE (HCC): ICD-10-CM

## 2023-11-30 LAB
ANION GAP SERPL CALC-SCNC: 5 MMOL/L (ref 5–15)
BUN SERPL-MCNC: 9 MG/DL (ref 6–20)
BUN/CREAT SERPL: 7 (ref 12–20)
CALCIUM SERPL-MCNC: 9.1 MG/DL (ref 8.5–10.1)
CHLORIDE SERPL-SCNC: 100 MMOL/L (ref 97–108)
CO2 SERPL-SCNC: 28 MMOL/L (ref 21–32)
CREAT SERPL-MCNC: 1.21 MG/DL (ref 0.55–1.02)
GLUCOSE SERPL-MCNC: 72 MG/DL (ref 65–100)
OSMOLALITY SERPL: 277 MOSM/KG H2O
POTASSIUM SERPL-SCNC: 4 MMOL/L (ref 3.5–5.1)
SODIUM SERPL-SCNC: 133 MMOL/L (ref 136–145)

## 2023-12-01 LAB — CORTIS SERPL-MCNC: 25.6 UG/DL

## 2024-01-25 RX ORDER — AMLODIPINE BESYLATE 5 MG/1
5 TABLET ORAL DAILY
Qty: 90 TABLET | Refills: 3 | Status: SHIPPED | OUTPATIENT
Start: 2024-01-25

## 2024-01-25 RX ORDER — CANDESARTAN 16 MG/1
16 TABLET ORAL DAILY
Qty: 90 TABLET | Refills: 3 | Status: SHIPPED | OUTPATIENT
Start: 2024-01-25

## 2024-01-25 RX ORDER — ATENOLOL 50 MG/1
50 TABLET ORAL DAILY
Qty: 90 TABLET | Refills: 3 | Status: SHIPPED | OUTPATIENT
Start: 2024-01-25

## 2024-01-25 NOTE — TELEPHONE ENCOUNTER
Last appointment: 11/30/23  Next appointment: 2/22/24  Previous refill encounter(s): 2/9/23    Requested Prescriptions     Pending Prescriptions Disp Refills    candesartan (ATACAND) 16 MG tablet 90 tablet 3     Sig: Take 1 tablet by mouth daily    atenolol (TENORMIN) 50 MG tablet 90 tablet 3     Sig: Take 1 tablet by mouth daily    amLODIPine (NORVASC) 5 MG tablet 90 tablet 3     Sig: Take 1 tablet by mouth daily         For Pharmacy Admin Tracking Only    Program: Medication Refill  CPA in place:    Recommendation Provided To:   Intervention Detail: New Rx: 3, reason: Patient Preference  Intervention Accepted By:   Gap Closed?:    Time Spent (min): 5

## 2024-01-29 RX ORDER — ATENOLOL 50 MG/1
50 TABLET ORAL DAILY
Qty: 90 TABLET | Refills: 3 | OUTPATIENT
Start: 2024-01-29

## 2024-01-29 RX ORDER — AMLODIPINE BESYLATE 5 MG/1
5 TABLET ORAL DAILY
Qty: 90 TABLET | Refills: 3 | OUTPATIENT
Start: 2024-01-29

## 2024-01-29 RX ORDER — CANDESARTAN 16 MG/1
16 TABLET ORAL DAILY
Qty: 90 TABLET | Refills: 3 | OUTPATIENT
Start: 2024-01-29

## 2024-02-22 ENCOUNTER — OFFICE VISIT (OUTPATIENT)
Age: 74
End: 2024-02-22
Payer: MEDICARE

## 2024-02-22 VITALS
HEIGHT: 62 IN | TEMPERATURE: 98 F | WEIGHT: 144 LBS | OXYGEN SATURATION: 98 % | BODY MASS INDEX: 26.5 KG/M2 | RESPIRATION RATE: 18 BRPM | HEART RATE: 60 BPM | SYSTOLIC BLOOD PRESSURE: 122 MMHG | DIASTOLIC BLOOD PRESSURE: 76 MMHG

## 2024-02-22 DIAGNOSIS — I10 ESSENTIAL (PRIMARY) HYPERTENSION: ICD-10-CM

## 2024-02-22 DIAGNOSIS — M79.672 BILATERAL FOOT PAIN: ICD-10-CM

## 2024-02-22 DIAGNOSIS — E87.1 HYPONATREMIA: ICD-10-CM

## 2024-02-22 DIAGNOSIS — M79.671 BILATERAL FOOT PAIN: ICD-10-CM

## 2024-02-22 DIAGNOSIS — E05.00 GRAVES' DISEASE: ICD-10-CM

## 2024-02-22 DIAGNOSIS — N18.31 STAGE 3A CHRONIC KIDNEY DISEASE (HCC): ICD-10-CM

## 2024-02-22 DIAGNOSIS — Z00.00 MEDICARE ANNUAL WELLNESS VISIT, SUBSEQUENT: Primary | ICD-10-CM

## 2024-02-22 DIAGNOSIS — L29.9 PRURITIC CONDITION: ICD-10-CM

## 2024-02-22 LAB
ALBUMIN SERPL-MCNC: 3.9 G/DL (ref 3.5–5)
ALBUMIN/GLOB SERPL: 1.2 (ref 1.1–2.2)
ALP SERPL-CCNC: 96 U/L (ref 45–117)
ALT SERPL-CCNC: 9 U/L (ref 12–78)
ANION GAP SERPL CALC-SCNC: 1 MMOL/L (ref 5–15)
AST SERPL-CCNC: 17 U/L (ref 15–37)
BILIRUB SERPL-MCNC: 0.5 MG/DL (ref 0.2–1)
BUN SERPL-MCNC: 10 MG/DL (ref 6–20)
BUN/CREAT SERPL: 8 (ref 12–20)
CALCIUM SERPL-MCNC: 9.5 MG/DL (ref 8.5–10.1)
CHLORIDE SERPL-SCNC: 100 MMOL/L (ref 97–108)
CO2 SERPL-SCNC: 28 MMOL/L (ref 21–32)
CREAT SERPL-MCNC: 1.26 MG/DL (ref 0.55–1.02)
GLOBULIN SER CALC-MCNC: 3.3 G/DL (ref 2–4)
GLUCOSE SERPL-MCNC: 98 MG/DL (ref 65–100)
OSMOLALITY SERPL: 279 MOSM/KG H2O
OSMOLALITY UR: 194 MOSM/KG H2O
POTASSIUM SERPL-SCNC: 4.6 MMOL/L (ref 3.5–5.1)
PROT SERPL-MCNC: 7.2 G/DL (ref 6.4–8.2)
SODIUM SERPL-SCNC: 129 MMOL/L (ref 136–145)
SODIUM UR-SCNC: 22 MMOL/L
T4 FREE SERPL-MCNC: 0.9 NG/DL (ref 0.8–1.5)
TSH SERPL DL<=0.05 MIU/L-ACNC: 1.95 UIU/ML (ref 0.36–3.74)

## 2024-02-22 PROCEDURE — 3074F SYST BP LT 130 MM HG: CPT | Performed by: NURSE PRACTITIONER

## 2024-02-22 PROCEDURE — 1036F TOBACCO NON-USER: CPT | Performed by: NURSE PRACTITIONER

## 2024-02-22 PROCEDURE — G8484 FLU IMMUNIZE NO ADMIN: HCPCS | Performed by: NURSE PRACTITIONER

## 2024-02-22 PROCEDURE — 3078F DIAST BP <80 MM HG: CPT | Performed by: NURSE PRACTITIONER

## 2024-02-22 PROCEDURE — G8427 DOCREV CUR MEDS BY ELIG CLIN: HCPCS | Performed by: NURSE PRACTITIONER

## 2024-02-22 PROCEDURE — 3017F COLORECTAL CA SCREEN DOC REV: CPT | Performed by: NURSE PRACTITIONER

## 2024-02-22 PROCEDURE — 1123F ACP DISCUSS/DSCN MKR DOCD: CPT | Performed by: NURSE PRACTITIONER

## 2024-02-22 PROCEDURE — 99213 OFFICE O/P EST LOW 20 MIN: CPT | Performed by: NURSE PRACTITIONER

## 2024-02-22 PROCEDURE — G8399 PT W/DXA RESULTS DOCUMENT: HCPCS | Performed by: NURSE PRACTITIONER

## 2024-02-22 PROCEDURE — 1090F PRES/ABSN URINE INCON ASSESS: CPT | Performed by: NURSE PRACTITIONER

## 2024-02-22 PROCEDURE — G0439 PPPS, SUBSEQ VISIT: HCPCS | Performed by: NURSE PRACTITIONER

## 2024-02-22 PROCEDURE — G8419 CALC BMI OUT NRM PARAM NOF/U: HCPCS | Performed by: NURSE PRACTITIONER

## 2024-02-22 ASSESSMENT — LIFESTYLE VARIABLES
HAS A RELATIVE, FRIEND, DOCTOR, OR ANOTHER HEALTH PROFESSIONAL EXPRESSED CONCERN ABOUT YOUR DRINKING OR SUGGESTED YOU CUT DOWN: 0
HOW OFTEN DO YOU HAVE A DRINK CONTAINING ALCOHOL: 2-3 TIMES A WEEK
HOW OFTEN DURING THE LAST YEAR HAVE YOU NEEDED AN ALCOHOLIC DRINK FIRST THING IN THE MORNING TO GET YOURSELF GOING AFTER A NIGHT OF HEAVY DRINKING: 0
HOW OFTEN DURING THE LAST YEAR HAVE YOU FOUND THAT YOU WERE NOT ABLE TO STOP DRINKING ONCE YOU HAD STARTED: 0
HOW OFTEN DURING THE LAST YEAR HAVE YOU FAILED TO DO WHAT WAS NORMALLY EXPECTED FROM YOU BECAUSE OF DRINKING: 0
HOW MANY STANDARD DRINKS CONTAINING ALCOHOL DO YOU HAVE ON A TYPICAL DAY: 3 OR 4
HAVE YOU OR SOMEONE ELSE BEEN INJURED AS A RESULT OF YOUR DRINKING: 0
HOW OFTEN DURING THE LAST YEAR HAVE YOU BEEN UNABLE TO REMEMBER WHAT HAPPENED THE NIGHT BEFORE BECAUSE YOU HAD BEEN DRINKING: 0
HOW OFTEN DURING THE LAST YEAR HAVE YOU HAD A FEELING OF GUILT OR REMORSE AFTER DRINKING: 0

## 2024-02-22 ASSESSMENT — PATIENT HEALTH QUESTIONNAIRE - PHQ9
1. LITTLE INTEREST OR PLEASURE IN DOING THINGS: 0
2. FEELING DOWN, DEPRESSED OR HOPELESS: 0
SUM OF ALL RESPONSES TO PHQ QUESTIONS 1-9: 0
SUM OF ALL RESPONSES TO PHQ QUESTIONS 1-9: 0
SUM OF ALL RESPONSES TO PHQ9 QUESTIONS 1 & 2: 0
SUM OF ALL RESPONSES TO PHQ QUESTIONS 1-9: 0
SUM OF ALL RESPONSES TO PHQ QUESTIONS 1-9: 0

## 2024-02-22 NOTE — PATIENT INSTRUCTIONS
Health. If you have questions about a medical condition or this instruction, always ask your healthcare professional. Healthwise, [facility] disclaims any warranty or liability for your use of this information.           Advance Directives: Care Instructions  Overview  An advance directive is a legal way to state your wishes at the end of your life. It tells your family and your doctor what to do if you can't say what you want.  There are two main types of advance directives. You can change them any time your wishes change.  Living will.  This form tells your family and your doctor your wishes about life support and other treatment. The form is also called a declaration.  Medical power of .  This form lets you name a person to make treatment decisions for you when you can't speak for yourself. This person is called a health care agent (health care proxy, health care surrogate). The form is also called a durable power of  for health care.  If you do not have an advance directive, decisions about your medical care may be made by a family member, or by a doctor or a  who doesn't know you.  It may help to think of an advance directive as a gift to the people who care for you. If you have one, they won't have to make tough decisions by themselves.  For more information, including forms for your state, see the [employer] website (www.caringinfo.org/planning/advance-directives/).  Follow-up care is a key part of your treatment and safety. Be sure to make and go to all appointments, and call your doctor if you are having problems. It's also a good idea to know your test results and keep a list of the medicines you take.  What should you include in an advance directive?  Many states have a unique advance directive form. (It may ask you to address specific issues.) Or you might use a universal form that's approved by many states.  If your form doesn't tell you what to address, it may be hard to know

## 2024-02-22 NOTE — PROGRESS NOTES
Medicare Annual Wellness Visit    Brenda Pena is here for Medicare AWV    Assessment & Plan   Medicare annual wellness visit, subsequent  Essential (primary) hypertension - stable  continue   -     Comprehensive Metabolic Panel; Future  Graves' disease - in remission - has been off methimazole since May 2023.  Will check labs  -     T3; Future  -     T4, Free; Future  -     TSH; Future  Hyponatremia - check labs  -     Comprehensive Metabolic Panel; Future  -     Sodium, urine, random; Future  -     Osmolality; Future  -     Osmolality, Urine; Future  Stage 3a chronic kidney disease (HCC)  -     Comprehensive Metabolic Panel; Future  Pruritic condition- goes through cycles of pruritus, unable to associate with any soap, lotion, detergent, food, medication.  Declines referral for allergy testing at this time.  Patient to keep a diary of symptoms along with a 24 hour recall of topicals, foods, OTC and Rx meds to see if she can identify trigger.  Bilateral foot pain - patient declines xrays today.  States she will try to increase exercise, encouraged patient to use heel inserts to correct over pronation of feet.  If no improvement, can check xrays at return visit    Recommendations for Preventive Services Due: see orders and patient instructions/AVS.  Recommended screening schedule for the next 5-10 years is provided to the patient in written form: see Patient Instructions/AVS.     Return in about 6 months (around 8/22/2024).     Subjective   The following acute and/or chronic problems were also addressed today:    patient comes in today for follow up thyroid    Itching comes and goes, last for couple weeks, may not recur for couple months    Feels like she is walking on balled up socks under feet.    Pain comes and goes.  If she is sitting she may notice.      She plans to go to [place] in April to see granddaughter graduate nursing school.     Hx Graves hyperthyroidism - has been off methimazole 5mg tablet

## 2024-02-22 NOTE — PROGRESS NOTES
Chief Complaint   Patient presents with    Medicare AWV       1. \"Have you been to the ER, urgent care clinic since your last visit?  Hospitalized since your last visit?\" No    2. \"Have you seen or consulted any other health care providers outside of the [facility] since your last visit?\" Yes 24 Dr. Mahajan for Eye Exam.     3. For patients aged 45-75: Has the patient had a colonoscopy / FIT/ Cologuard? Yes      If the patient is female:    4. For patients aged 40-74: Has the patient had a mammogram within the past 2 years? Yes      5. For patients aged 21-65: Has the patient had a pap smear? N/A       The patient, Brenda Bouchers, identity was verified by name and .      Health Maintenance Due   Topic Date Due    Respiratory Syncytial Virus (RSV) Pregnant or age 60 yrs+ (1 - 1-dose 60+ series) Never done    COVID-19 Vaccine (2023- season) 2023    Annual Wellness Visit (Medicare)  12/15/2023

## 2024-02-24 LAB — T3 SERPL-MCNC: 69 NG/DL (ref 71–180)

## 2024-03-12 ENCOUNTER — CLINICAL DOCUMENTATION (OUTPATIENT)
Age: 74
End: 2024-03-12

## 2024-03-12 DIAGNOSIS — E87.1 HYPONATREMIA: Primary | ICD-10-CM

## 2024-07-11 ENCOUNTER — TRANSCRIBE ORDERS (OUTPATIENT)
Facility: HOSPITAL | Age: 74
End: 2024-07-11

## 2024-07-11 DIAGNOSIS — Z12.31 VISIT FOR SCREENING MAMMOGRAM: Primary | ICD-10-CM

## 2024-08-01 ENCOUNTER — HOSPITAL ENCOUNTER (OUTPATIENT)
Facility: HOSPITAL | Age: 74
Discharge: HOME | End: 2024-08-01
Payer: MEDICARE

## 2024-08-01 ENCOUNTER — TRANSCRIBE ORDERS (OUTPATIENT)
Facility: HOSPITAL | Age: 74
End: 2024-08-01

## 2024-08-01 DIAGNOSIS — D86.9 SARCOIDOSIS: Primary | ICD-10-CM

## 2024-08-01 DIAGNOSIS — D86.9 SARCOIDOSIS: ICD-10-CM

## 2024-08-01 PROCEDURE — 71046 X-RAY EXAM CHEST 2 VIEWS: CPT

## 2024-08-12 ENCOUNTER — HOSPITAL ENCOUNTER (OUTPATIENT)
Facility: HOSPITAL | Age: 74
Discharge: HOME | End: 2024-08-15
Payer: MEDICARE

## 2024-08-12 VITALS — HEIGHT: 62 IN | BODY MASS INDEX: 26.13 KG/M2 | WEIGHT: 142 LBS

## 2024-08-12 DIAGNOSIS — Z12.31 VISIT FOR SCREENING MAMMOGRAM: ICD-10-CM

## 2024-08-12 PROCEDURE — 77063 BREAST TOMOSYNTHESIS BI: CPT

## 2024-08-15 ENCOUNTER — OFFICE VISIT (OUTPATIENT)
Age: 74
End: 2024-08-15
Payer: MEDICARE

## 2024-08-15 ENCOUNTER — ANCILLARY PROCEDURE (OUTPATIENT)
Age: 74
End: 2024-08-15
Payer: MEDICARE

## 2024-08-15 VITALS
SYSTOLIC BLOOD PRESSURE: 113 MMHG | BODY MASS INDEX: 25.97 KG/M2 | DIASTOLIC BLOOD PRESSURE: 67 MMHG | TEMPERATURE: 97.8 F | HEART RATE: 55 BPM | RESPIRATION RATE: 20 BRPM | OXYGEN SATURATION: 99 % | WEIGHT: 142 LBS

## 2024-08-15 DIAGNOSIS — E87.1 HYPONATREMIA: ICD-10-CM

## 2024-08-15 DIAGNOSIS — M79.671 BILATERAL FOOT PAIN: ICD-10-CM

## 2024-08-15 DIAGNOSIS — I10 ESSENTIAL (PRIMARY) HYPERTENSION: Primary | ICD-10-CM

## 2024-08-15 DIAGNOSIS — E05.90 HYPERTHYROIDISM: ICD-10-CM

## 2024-08-15 DIAGNOSIS — N18.31 STAGE 3A CHRONIC KIDNEY DISEASE (HCC): ICD-10-CM

## 2024-08-15 DIAGNOSIS — E05.00 GRAVES' DISEASE: ICD-10-CM

## 2024-08-15 DIAGNOSIS — M79.672 BILATERAL FOOT PAIN: ICD-10-CM

## 2024-08-15 DIAGNOSIS — I10 ESSENTIAL (PRIMARY) HYPERTENSION: ICD-10-CM

## 2024-08-15 LAB
ALBUMIN SERPL-MCNC: 4.1 G/DL (ref 3.5–5)
ALBUMIN/GLOB SERPL: 1.2 (ref 1.1–2.2)
ALP SERPL-CCNC: 104 U/L (ref 45–117)
ALT SERPL-CCNC: 12 U/L (ref 12–78)
ANION GAP SERPL CALC-SCNC: 6 MMOL/L (ref 5–15)
AST SERPL-CCNC: 17 U/L (ref 15–37)
BILIRUB SERPL-MCNC: 0.3 MG/DL (ref 0.2–1)
BUN SERPL-MCNC: 9 MG/DL (ref 6–20)
BUN/CREAT SERPL: 8 (ref 12–20)
CALCIUM SERPL-MCNC: 9.8 MG/DL (ref 8.5–10.1)
CHLORIDE SERPL-SCNC: 95 MMOL/L (ref 97–108)
CHOLEST SERPL-MCNC: 211 MG/DL
CO2 SERPL-SCNC: 28 MMOL/L (ref 21–32)
CREAT SERPL-MCNC: 1.17 MG/DL (ref 0.55–1.02)
ERYTHROCYTE [DISTWIDTH] IN BLOOD BY AUTOMATED COUNT: 12.6 % (ref 11.5–14.5)
GLOBULIN SER CALC-MCNC: 3.4 G/DL (ref 2–4)
GLUCOSE SERPL-MCNC: 90 MG/DL (ref 65–100)
HCT VFR BLD AUTO: 32.4 % (ref 35–47)
HDLC SERPL-MCNC: 144 MG/DL
HDLC SERPL: 1.5 (ref 0–5)
HGB BLD-MCNC: 10.4 G/DL (ref 11.5–16)
LDLC SERPL CALC-MCNC: 49.6 MG/DL (ref 0–100)
MCH RBC QN AUTO: 29.1 PG (ref 26–34)
MCHC RBC AUTO-ENTMCNC: 32.1 G/DL (ref 30–36.5)
MCV RBC AUTO: 90.5 FL (ref 80–99)
NRBC # BLD: 0 K/UL (ref 0–0.01)
NRBC BLD-RTO: 0 PER 100 WBC
PLATELET # BLD AUTO: 327 K/UL (ref 150–400)
PMV BLD AUTO: 8.6 FL (ref 8.9–12.9)
POTASSIUM SERPL-SCNC: 4.8 MMOL/L (ref 3.5–5.1)
PROT SERPL-MCNC: 7.5 G/DL (ref 6.4–8.2)
RBC # BLD AUTO: 3.58 M/UL (ref 3.8–5.2)
SODIUM SERPL-SCNC: 129 MMOL/L (ref 136–145)
T4 FREE SERPL-MCNC: 0.9 NG/DL (ref 0.8–1.5)
TRIGL SERPL-MCNC: 87 MG/DL
TSH SERPL DL<=0.05 MIU/L-ACNC: 2.29 UIU/ML (ref 0.36–3.74)
VLDLC SERPL CALC-MCNC: 17.4 MG/DL
WBC # BLD AUTO: 7.3 K/UL (ref 3.6–11)

## 2024-08-15 PROCEDURE — 1090F PRES/ABSN URINE INCON ASSESS: CPT | Performed by: NURSE PRACTITIONER

## 2024-08-15 PROCEDURE — 3017F COLORECTAL CA SCREEN DOC REV: CPT | Performed by: NURSE PRACTITIONER

## 2024-08-15 PROCEDURE — 99214 OFFICE O/P EST MOD 30 MIN: CPT | Performed by: NURSE PRACTITIONER

## 2024-08-15 PROCEDURE — G8399 PT W/DXA RESULTS DOCUMENT: HCPCS | Performed by: NURSE PRACTITIONER

## 2024-08-15 PROCEDURE — 3078F DIAST BP <80 MM HG: CPT | Performed by: NURSE PRACTITIONER

## 2024-08-15 PROCEDURE — G8427 DOCREV CUR MEDS BY ELIG CLIN: HCPCS | Performed by: NURSE PRACTITIONER

## 2024-08-15 PROCEDURE — 73630 X-RAY EXAM OF FOOT: CPT

## 2024-08-15 PROCEDURE — 3074F SYST BP LT 130 MM HG: CPT | Performed by: NURSE PRACTITIONER

## 2024-08-15 PROCEDURE — G8419 CALC BMI OUT NRM PARAM NOF/U: HCPCS | Performed by: NURSE PRACTITIONER

## 2024-08-15 PROCEDURE — 1036F TOBACCO NON-USER: CPT | Performed by: NURSE PRACTITIONER

## 2024-08-15 PROCEDURE — 1123F ACP DISCUSS/DSCN MKR DOCD: CPT | Performed by: NURSE PRACTITIONER

## 2024-08-15 ASSESSMENT — ENCOUNTER SYMPTOMS
ABDOMINAL PAIN: 0
DIARRHEA: 0
BLOOD IN STOOL: 0
NAUSEA: 0
VOMITING: 0
COUGH: 0
CONSTIPATION: 0
SHORTNESS OF BREATH: 0

## 2024-08-15 ASSESSMENT — PATIENT HEALTH QUESTIONNAIRE - PHQ9
SUM OF ALL RESPONSES TO PHQ QUESTIONS 1-9: 0
2. FEELING DOWN, DEPRESSED OR HOPELESS: NOT AT ALL
1. LITTLE INTEREST OR PLEASURE IN DOING THINGS: NOT AT ALL
SUM OF ALL RESPONSES TO PHQ QUESTIONS 1-9: 0
SUM OF ALL RESPONSES TO PHQ9 QUESTIONS 1 & 2: 0

## 2024-08-15 NOTE — PROGRESS NOTES
Brenda Pena (:  1950) is a 73 y.o. female,Established patient, here for evaluation of the following chief complaint(s):  6 Month Follow-Up      Assessment & Plan   ASSESSMENT/PLAN:  1. Essential (primary) hypertension - stable.  Taking amlodipine 5mg, atenolol 50mg, candesartan 16mg  -     CBC; Future  -     Comprehensive Metabolic Panel; Future  -     Lipid Panel; Future  2. Hyperthyroidism - has been off methimazole 5mg tablet since May 2023 since TSH normal and TrAb negative - no symptoms of hyperthyroidism.  Recheck labs  -     TSH; Future  -     T4, Free; Future  -     T3; Future  3. Graves' disease  -     TSH; Future  -     T4, Free; Future  -     T3; Future  4. Stage 3a chronic kidney disease (HCC)  -     Comprehensive Metabolic Panel; Future  5. Hyponatremia - will recheck labs.  Patient did not receive info to schedule with nephrology.  Most likely beer potomania.  -     Comprehensive Metabolic Panel; Future  6. Bilateral foot pain - will check xrays, can refer to podiatry  -     XR FOOT RIGHT (MIN 3 VIEWS); Future  -     XR FOOT LEFT (MIN 3 VIEWS); Future      Return in about 5 months (around 2025).   Patient to schedule follow up with new provider in next 3-4 months as I will be leaving the practice on 24.  Patient provided with a list of primary care providers in the area to schedule with.        Subjective   SUBJECTIVE/OBJECTIVE:  HPI  patient comes in today for follow up thyroid    Has been having hyponatremia on labs.  Was referred to nephrology at last visit, but patient states she did not receive information.  Patient states she does drink 3-4 beers daily.     Hx CKD, last creatinine 1.26, eGFR 46 in 2024.  Has some anemia, most liekyl related to CKD per heme/onc    Feels like she is walking on balled up socks under feet.  May have some tingling in feet as well.     Hx Graves hyperthyroidism - has been off methimazole 5mg tablet since May 2023 - no symptoms of

## 2024-08-15 NOTE — PROGRESS NOTES
Chief Complaint   Patient presents with    6 Month Follow-Up       \"Have you been to the ER, urgent care clinic since your last visit?  Hospitalized since your last visit?\"    NO    “Have you seen or consulted any other health care providers outside of [facility] since your last visit?”    YES - When: approximately 2 months ago.  Where and Why: Dr. Jules Mahajan. For eyes            Click Here for Release of Records Request       There were no vitals filed for this visit.   Health Maintenance Due   Topic Date Due    Respiratory Syncytial Virus (RSV) Pregnant or age 60 yrs+ (1 - 1-dose 60+ series) Never done    Flu vaccine (1) 2024        The patient, Brenda Pena, identity was verified by name and .

## 2024-08-15 NOTE — PATIENT INSTRUCTIONS
Dr. Umu Arndt  [facility]  [address] [place] [address]    Mel Ybarra NP, Lela Tan NP  [facility]  [address], [address]  [place] [address] [phone]  OR  [facility]  [address] [place] [address] [phone]    Wilfred Lux NP, Rocio Forrester [facility]  [address] [place] [address] [phone]    Dr. Greg Starks, Dr. Jocelyn Craig, Dr. [facility]  [address], [address]  [place] [address] [phone]    Monique Graf, APRN-[facility]  [address], [address]  [place] [address] [phone]     Dr. Rafael Forrester [facility]  [address], [address]  [place] [address] [phone]    Olya Davis NP  [facility]  [address], [address]  [place] [address] [phone]

## 2024-08-16 LAB — T3 SERPL-MCNC: 67 NG/DL (ref 71–180)

## 2024-09-09 ENCOUNTER — TELEPHONE (OUTPATIENT)
Age: 74
End: 2024-09-09

## 2024-09-27 ENCOUNTER — TRANSCRIBE ORDERS (OUTPATIENT)
Facility: HOSPITAL | Age: 74
End: 2024-09-27

## 2024-09-27 DIAGNOSIS — N18.30 STAGE 3 CHRONIC KIDNEY DISEASE, UNSPECIFIED WHETHER STAGE 3A OR 3B CKD (HCC): Primary | ICD-10-CM

## 2024-10-04 ENCOUNTER — HOSPITAL ENCOUNTER (OUTPATIENT)
Facility: HOSPITAL | Age: 74
Discharge: HOME | End: 2024-10-07
Payer: MEDICARE

## 2024-10-04 DIAGNOSIS — N18.30 STAGE 3 CHRONIC KIDNEY DISEASE, UNSPECIFIED WHETHER STAGE 3A OR 3B CKD (HCC): ICD-10-CM

## 2024-10-04 PROCEDURE — 76770 US EXAM ABDO BACK WALL COMP: CPT

## 2024-12-23 RX ORDER — ATENOLOL 50 MG/1
50 TABLET ORAL DAILY
Qty: 90 TABLET | Refills: 0 | Status: SHIPPED | OUTPATIENT
Start: 2024-12-23

## 2024-12-23 RX ORDER — AMLODIPINE BESYLATE 5 MG/1
5 TABLET ORAL DAILY
Qty: 90 TABLET | Refills: 0 | Status: SHIPPED | OUTPATIENT
Start: 2024-12-23

## 2024-12-23 RX ORDER — CANDESARTAN 16 MG/1
16 TABLET ORAL DAILY
Qty: 90 TABLET | Refills: 0 | Status: SHIPPED | OUTPATIENT
Start: 2024-12-23

## 2024-12-23 NOTE — TELEPHONE ENCOUNTER
Last appointment: 8/15/24  Next appointment: 1/15/25  Previous refill encounter(s): 1/25/24 90 d/s with 3 refills    Requested Prescriptions     Pending Prescriptions Disp Refills    amLODIPine (NORVASC) 5 MG tablet 90 tablet 0     Sig: Take 1 tablet by mouth daily    atenolol (TENORMIN) 50 MG tablet 90 tablet 0     Sig: Take 1 tablet by mouth daily    candesartan (ATACAND) 16 MG tablet 90 tablet 0     Sig: Take 1 tablet by mouth daily         For Pharmacy Admin Tracking Only    Program: Medication Refill  CPA in place:    Recommendation Provided To:   Intervention Detail: New Rx: 3, reason: Patient Preference  Intervention Accepted By:   Gap Closed?:    Time Spent (min): 5

## 2025-01-15 ENCOUNTER — OFFICE VISIT (OUTPATIENT)
Age: 75
End: 2025-01-15
Payer: MEDICARE

## 2025-01-15 VITALS
WEIGHT: 139.8 LBS | HEIGHT: 62 IN | SYSTOLIC BLOOD PRESSURE: 128 MMHG | DIASTOLIC BLOOD PRESSURE: 76 MMHG | OXYGEN SATURATION: 99 % | HEART RATE: 54 BPM | TEMPERATURE: 97.4 F | RESPIRATION RATE: 20 BRPM | BODY MASS INDEX: 25.73 KG/M2

## 2025-01-15 DIAGNOSIS — E87.1 HYPONATREMIA: ICD-10-CM

## 2025-01-15 DIAGNOSIS — I10 ESSENTIAL (PRIMARY) HYPERTENSION: ICD-10-CM

## 2025-01-15 DIAGNOSIS — M79.672 BILATERAL FOOT PAIN: ICD-10-CM

## 2025-01-15 DIAGNOSIS — N18.31 STAGE 3A CHRONIC KIDNEY DISEASE (HCC): ICD-10-CM

## 2025-01-15 DIAGNOSIS — M79.671 BILATERAL FOOT PAIN: ICD-10-CM

## 2025-01-15 DIAGNOSIS — Z76.89 ENCOUNTER TO ESTABLISH CARE: Primary | ICD-10-CM

## 2025-01-15 PROCEDURE — 99214 OFFICE O/P EST MOD 30 MIN: CPT | Performed by: STUDENT IN AN ORGANIZED HEALTH CARE EDUCATION/TRAINING PROGRAM

## 2025-01-15 SDOH — ECONOMIC STABILITY: FOOD INSECURITY: WITHIN THE PAST 12 MONTHS, YOU WORRIED THAT YOUR FOOD WOULD RUN OUT BEFORE YOU GOT MONEY TO BUY MORE.: NEVER TRUE

## 2025-01-15 SDOH — ECONOMIC STABILITY: FOOD INSECURITY: WITHIN THE PAST 12 MONTHS, THE FOOD YOU BOUGHT JUST DIDN'T LAST AND YOU DIDN'T HAVE MONEY TO GET MORE.: NEVER TRUE

## 2025-01-15 ASSESSMENT — PATIENT HEALTH QUESTIONNAIRE - PHQ9
1. LITTLE INTEREST OR PLEASURE IN DOING THINGS: NOT AT ALL
SUM OF ALL RESPONSES TO PHQ9 QUESTIONS 1 & 2: 0
SUM OF ALL RESPONSES TO PHQ QUESTIONS 1-9: 0
SUM OF ALL RESPONSES TO PHQ QUESTIONS 1-9: 0
2. FEELING DOWN, DEPRESSED OR HOPELESS: NOT AT ALL
SUM OF ALL RESPONSES TO PHQ QUESTIONS 1-9: 0
SUM OF ALL RESPONSES TO PHQ QUESTIONS 1-9: 0

## 2025-01-15 NOTE — PROGRESS NOTES
Chief Complaint   Patient presents with    Establish Care       \"Have you been to the ER, urgent care clinic since your last visit?  Hospitalized since your last visit?\"    NO    “Have you seen or consulted any other health care providers outside of [facility] since your last visit?”    NO            Click Here for Release of Records Request     No results found for this visit on 01/15/25.   Vitals:    01/15/25 0959   BP: 128/76   Site: Right Upper Arm   Position: Sitting   Cuff Size: Large Adult   Pulse: 54   Resp: 20   Temp: 97.4 °F (36.3 °C)   TempSrc: Temporal   SpO2: 99%   Weight: 63.4 kg (139 lb 12.8 oz)   Height: 1.575 m (5' 2\")      There are no preventive care reminders to display for this patient.     The patient, Brenda Pena, identity was verified by name and .

## 2025-01-15 NOTE — PROGRESS NOTES
[facility]  [address] [place] [address] [phone]        Establish Care Visit      Subjective     Chief Complaint   Patient presents with    Establish Care         CC: Establish care    HPI: Brenda Pena is a 74 y.o. female presenting to the clinic today to establish care. Pt  has a past medical history of Arthritis, Glaucoma, Hypertension, Hyponatremia, Osteopenia, Post-menopause, Sarcoidosis, and Thyroid disease. Former patient of [facility]        CKD Stage 3a/Hypokalemia  Recently established with nephrology.  Patient is a history of CKD 3 and primarily from hypertension.  Has been working on drinking more water, has cut back beer on to 3/day.  Has resulting hypokalemia  Patient states that she had recent lab work done with nephrology within the past month, states that there was no significant change  Hypertension  /76  Meds: amlodipine 5mg, atenolol 50mg, candesartan 16mg  Foot pain  Patient states that she continues to have bilateral foot pain mostly in the forefoot.  States that it feels like she is constantly stepping on little balls.  X-rays were completed this year and showed DJD at the first toes bilaterally as well as in the proximal forefoot bilaterally    EXAM: XR FOOT LEFT (MIN 3 VIEWS) 8/2024    INDICATION: Pain in right foot; Pain in left foot. Pain in right foot / Reason  for exam:->left foot pain.    COMPARISON: None.    FINDINGS: Three views of the left foot demonstrate no fracture or other acute  osseous or articular abnormality. The soft tissues are within normal limits.  Early DJD first metatarsophalangeal joints. Small plantar calcaneal spur. ...   Impression   No acute abnormality. DJD    Electronically signed by AMPARO BRUNNER MD     EXAM: XR FOOT RIGHT (MIN 3 VIEWS) 8/2024     INDICATION: Pain in right foot; Pain in left foot.     COMPARISON: None.     FINDINGS: Three views of the right foot demonstrate no

## 2025-03-25 RX ORDER — ATENOLOL 50 MG/1
50 TABLET ORAL DAILY
Qty: 90 TABLET | Refills: 1 | Status: SHIPPED | OUTPATIENT
Start: 2025-03-25

## 2025-03-25 RX ORDER — AMLODIPINE BESYLATE 5 MG/1
5 TABLET ORAL DAILY
Qty: 90 TABLET | Refills: 1 | Status: SHIPPED | OUTPATIENT
Start: 2025-03-25

## 2025-03-25 NOTE — TELEPHONE ENCOUNTER
Last appointment: 1/15/25  Next appointment: 5/16/25  Previous refill encounter(s): 12/23/24 #90    Requested Prescriptions     Pending Prescriptions Disp Refills    atenolol (TENORMIN) 50 MG tablet [Pharmacy Med Name: ATENOLOL TABS 50MG] 90 tablet 3     Sig: TAKE 1 TABLET DAILY    amLODIPine (NORVASC) 5 MG tablet [Pharmacy Med Name: AMLODIPINE BESYLATE TABS 5MG] 90 tablet 3     Sig: TAKE 1 TABLET DAILY         For Pharmacy Admin Tracking Only    Program: Medication Refill  CPA in place:    Recommendation Provided To:   Intervention Detail: New Rx: 2, reason: Patient Preference  Intervention Accepted By:   Gap Closed?:    Time Spent (min): 5

## 2025-03-26 RX ORDER — CANDESARTAN 16 MG/1
16 TABLET ORAL DAILY
Qty: 90 TABLET | Refills: 1 | Status: SHIPPED | OUTPATIENT
Start: 2025-03-26

## 2025-05-13 SDOH — HEALTH STABILITY: PHYSICAL HEALTH: ON AVERAGE, HOW MANY DAYS PER WEEK DO YOU ENGAGE IN MODERATE TO STRENUOUS EXERCISE (LIKE A BRISK WALK)?: 3 DAYS

## 2025-05-13 SDOH — HEALTH STABILITY: PHYSICAL HEALTH: ON AVERAGE, HOW MANY MINUTES DO YOU ENGAGE IN EXERCISE AT THIS LEVEL?: 30 MIN

## 2025-05-13 ASSESSMENT — LIFESTYLE VARIABLES
HOW MANY STANDARD DRINKS CONTAINING ALCOHOL DO YOU HAVE ON A TYPICAL DAY: 2
HAS A RELATIVE, FRIEND, DOCTOR, OR ANOTHER HEALTH PROFESSIONAL EXPRESSED CONCERN ABOUT YOUR DRINKING OR SUGGESTED YOU CUT DOWN: YES, DURING THE PAST YEAR
HAS A RELATIVE, FRIEND, DOCTOR, OR ANOTHER HEALTH PROFESSIONAL EXPRESSED CONCERN ABOUT YOUR DRINKING OR SUGGESTED YOU CUT DOWN: YES, DURING THE PAST YEAR
HOW OFTEN DO YOU HAVE SIX OR MORE DRINKS ON ONE OCCASION: 2
HOW OFTEN DURING THE LAST YEAR HAVE YOU BEEN UNABLE TO REMEMBER WHAT HAPPENED THE NIGHT BEFORE BECAUSE YOU HAD BEEN DRINKING: NEVER
HAVE YOU OR SOMEONE ELSE BEEN INJURED AS A RESULT OF YOUR DRINKING: NO
HOW OFTEN DURING THE LAST YEAR HAVE YOU NEEDED AN ALCOHOLIC DRINK FIRST THING IN THE MORNING TO GET YOURSELF GOING AFTER A NIGHT OF HEAVY DRINKING: NEVER
HOW OFTEN DO YOU HAVE A DRINK CONTAINING ALCOHOL: 4
HOW OFTEN DURING THE LAST YEAR HAVE YOU FOUND THAT YOU WERE NOT ABLE TO STOP DRINKING ONCE YOU HAD STARTED: NEVER
HOW OFTEN DURING THE LAST YEAR HAVE YOU FOUND THAT YOU WERE NOT ABLE TO STOP DRINKING ONCE YOU HAD STARTED: NEVER
HOW OFTEN DURING THE LAST YEAR HAVE YOU HAD A FEELING OF GUILT OR REMORSE AFTER DRINKING: NEVER
HOW OFTEN DURING THE LAST YEAR HAVE YOU FAILED TO DO WHAT WAS NORMALLY EXPECTED FROM YOU BECAUSE OF DRINKING: NEVER
HOW OFTEN DURING THE LAST YEAR HAVE YOU BEEN UNABLE TO REMEMBER WHAT HAPPENED THE NIGHT BEFORE BECAUSE YOU HAD BEEN DRINKING: NEVER
HOW MANY STANDARD DRINKS CONTAINING ALCOHOL DO YOU HAVE ON A TYPICAL DAY: 3 OR 4
HOW OFTEN DURING THE LAST YEAR HAVE YOU HAD A FEELING OF GUILT OR REMORSE AFTER DRINKING: NEVER
HOW OFTEN DURING THE LAST YEAR HAVE YOU FAILED TO DO WHAT WAS NORMALLY EXPECTED FROM YOU BECAUSE OF DRINKING: NEVER
HAVE YOU OR SOMEONE ELSE BEEN INJURED AS A RESULT OF YOUR DRINKING: NO
HOW OFTEN DURING THE LAST YEAR HAVE YOU NEEDED AN ALCOHOLIC DRINK FIRST THING IN THE MORNING TO GET YOURSELF GOING AFTER A NIGHT OF HEAVY DRINKING: NEVER
HOW OFTEN DO YOU HAVE A DRINK CONTAINING ALCOHOL: 2-3 TIMES A WEEK

## 2025-05-13 ASSESSMENT — PATIENT HEALTH QUESTIONNAIRE - PHQ9
SUM OF ALL RESPONSES TO PHQ QUESTIONS 1-9: 0
1. LITTLE INTEREST OR PLEASURE IN DOING THINGS: NOT AT ALL
2. FEELING DOWN, DEPRESSED OR HOPELESS: NOT AT ALL
SUM OF ALL RESPONSES TO PHQ QUESTIONS 1-9: 0

## 2025-05-16 ENCOUNTER — OFFICE VISIT (OUTPATIENT)
Age: 75
End: 2025-05-16
Payer: MEDICARE

## 2025-05-16 VITALS
HEIGHT: 62 IN | TEMPERATURE: 97.5 F | BODY MASS INDEX: 25.65 KG/M2 | WEIGHT: 139.4 LBS | SYSTOLIC BLOOD PRESSURE: 135 MMHG | RESPIRATION RATE: 20 BRPM | HEART RATE: 57 BPM | DIASTOLIC BLOOD PRESSURE: 78 MMHG | OXYGEN SATURATION: 99 %

## 2025-05-16 DIAGNOSIS — I10 PRIMARY HYPERTENSION: ICD-10-CM

## 2025-05-16 DIAGNOSIS — N18.31 STAGE 3A CHRONIC KIDNEY DISEASE (HCC): ICD-10-CM

## 2025-05-16 DIAGNOSIS — M79.672 BILATERAL FOOT PAIN: ICD-10-CM

## 2025-05-16 DIAGNOSIS — E87.1 HYPONATREMIA: ICD-10-CM

## 2025-05-16 DIAGNOSIS — M79.671 BILATERAL FOOT PAIN: ICD-10-CM

## 2025-05-16 DIAGNOSIS — Z13.39 ALCOHOL CONSUMPTION OF ONE TO FOUR DRINKS PER DAY ON ALCOHOL SCREENING: ICD-10-CM

## 2025-05-16 DIAGNOSIS — Z00.00 MEDICARE ANNUAL WELLNESS VISIT, SUBSEQUENT: Primary | ICD-10-CM

## 2025-05-16 DIAGNOSIS — E05.90 HYPERTHYROIDISM: ICD-10-CM

## 2025-05-16 PROCEDURE — 1036F TOBACCO NON-USER: CPT | Performed by: STUDENT IN AN ORGANIZED HEALTH CARE EDUCATION/TRAINING PROGRAM

## 2025-05-16 PROCEDURE — 1090F PRES/ABSN URINE INCON ASSESS: CPT | Performed by: STUDENT IN AN ORGANIZED HEALTH CARE EDUCATION/TRAINING PROGRAM

## 2025-05-16 PROCEDURE — 99214 OFFICE O/P EST MOD 30 MIN: CPT | Performed by: STUDENT IN AN ORGANIZED HEALTH CARE EDUCATION/TRAINING PROGRAM

## 2025-05-16 PROCEDURE — G8427 DOCREV CUR MEDS BY ELIG CLIN: HCPCS | Performed by: STUDENT IN AN ORGANIZED HEALTH CARE EDUCATION/TRAINING PROGRAM

## 2025-05-16 PROCEDURE — 1160F RVW MEDS BY RX/DR IN RCRD: CPT | Performed by: STUDENT IN AN ORGANIZED HEALTH CARE EDUCATION/TRAINING PROGRAM

## 2025-05-16 PROCEDURE — 3075F SYST BP GE 130 - 139MM HG: CPT | Performed by: STUDENT IN AN ORGANIZED HEALTH CARE EDUCATION/TRAINING PROGRAM

## 2025-05-16 PROCEDURE — G0439 PPPS, SUBSEQ VISIT: HCPCS | Performed by: STUDENT IN AN ORGANIZED HEALTH CARE EDUCATION/TRAINING PROGRAM

## 2025-05-16 PROCEDURE — 1126F AMNT PAIN NOTED NONE PRSNT: CPT | Performed by: STUDENT IN AN ORGANIZED HEALTH CARE EDUCATION/TRAINING PROGRAM

## 2025-05-16 PROCEDURE — G8399 PT W/DXA RESULTS DOCUMENT: HCPCS | Performed by: STUDENT IN AN ORGANIZED HEALTH CARE EDUCATION/TRAINING PROGRAM

## 2025-05-16 PROCEDURE — G8419 CALC BMI OUT NRM PARAM NOF/U: HCPCS | Performed by: STUDENT IN AN ORGANIZED HEALTH CARE EDUCATION/TRAINING PROGRAM

## 2025-05-16 PROCEDURE — 3017F COLORECTAL CA SCREEN DOC REV: CPT | Performed by: STUDENT IN AN ORGANIZED HEALTH CARE EDUCATION/TRAINING PROGRAM

## 2025-05-16 PROCEDURE — 1159F MED LIST DOCD IN RCRD: CPT | Performed by: STUDENT IN AN ORGANIZED HEALTH CARE EDUCATION/TRAINING PROGRAM

## 2025-05-16 PROCEDURE — 3078F DIAST BP <80 MM HG: CPT | Performed by: STUDENT IN AN ORGANIZED HEALTH CARE EDUCATION/TRAINING PROGRAM

## 2025-05-16 PROCEDURE — 1123F ACP DISCUSS/DSCN MKR DOCD: CPT | Performed by: STUDENT IN AN ORGANIZED HEALTH CARE EDUCATION/TRAINING PROGRAM

## 2025-05-16 NOTE — PROGRESS NOTES
[facility]  [address] [place] [address] [phone]      Medicare Annual Wellness Visit    Brenda Pena is here for Medicare AWV     Diagnosis Orders   1. Medicare annual wellness visit, subsequent        2. Primary hypertension  Comprehensive Metabolic Panel      3. Stage 3a chronic kidney disease (HCC)  CBC    Comprehensive Metabolic Panel      4. Hyperthyroidism  TSH + Free T4 Panel      5. Hyponatremia        6. Alcohol consumption of one to four drinks per day on alcohol screening        7. Bilateral foot pain            Assessment & Plan  1. Arthritis: Chronic.  - X-rays show arthritis in first toes and forefoot.  - Capsaicin discontinued due to burning sensation.  - Discussed meloxicam, diclofenac, and cortisone injections. Voltaren gel declined due to past adverse reactions.  - Advised to contact podiatrist for  injections. If unavailable, referral to another specialist will be arranged.    2. Hyponatremia.  - Low sodium levels, potentially due to high beer consumption.  - Beer acts as a diuretic, causing sodium loss.  - Advised to limit beer intake to one 12-ounce can per day, consumed slowly, and up to two beers on weekends.    3. Health maintenance.  - Advised to undergo tests today or in July 2025.  - Comprehensive labs to assess kidney function and thyroid levels.    4.  Hypertension  Chronic, controlled. Continue current regimen as described in the HPI and reconciled medication list      Follow-up  - Follow up in 6 months.    Results      Imaging   - X-ray: Arthritis in first toes and forefoot     No follow-ups on file.     Subjective     History of Present Illness  The patient presents for a Medicare annual wellness visit.    She reports intermittent foot discomfort, described as tightness and a sensation of stepping on small objects under the ball of her feet. Referred to a podiatrist, who performed an x-ray showing normal

## 2025-05-16 NOTE — PROGRESS NOTES
Chief Complaint   Patient presents with    Medicare AWV       \"Have you been to the ER, urgent care clinic since your last visit?  Hospitalized since your last visit?\"    NO    “Have you seen or consulted any other health care providers outside of [facility] since your last visit?”    NO            Click Here for Release of Records Request     No results found for this visit on 25.   Vitals:    25 1031   BP: (!) 142/72   Pulse: 57   Resp: 20   Temp: 97.5 °F (36.4 °C)   TempSrc: Temporal   SpO2: 99%   Weight: 63.2 kg (139 lb 6.4 oz)   Height: 1.575 m (5' 2\")      Health Maintenance Due   Topic Date Due    Annual Wellness Visit (Medicare)  2025        The patient, Brenda Pena, identity was verified by name and .

## 2025-07-21 ENCOUNTER — TRANSCRIBE ORDERS (OUTPATIENT)
Facility: HOSPITAL | Age: 75
End: 2025-07-21

## 2025-07-21 DIAGNOSIS — Z12.31 VISIT FOR SCREENING MAMMOGRAM: Primary | ICD-10-CM

## 2025-08-26 ENCOUNTER — HOSPITAL ENCOUNTER (OUTPATIENT)
Facility: HOSPITAL | Age: 75
Discharge: HOME | End: 2025-08-29
Attending: STUDENT IN AN ORGANIZED HEALTH CARE EDUCATION/TRAINING PROGRAM
Payer: MEDICARE

## 2025-08-26 DIAGNOSIS — E05.90 HYPERTHYROIDISM: ICD-10-CM

## 2025-08-26 DIAGNOSIS — Z12.31 VISIT FOR SCREENING MAMMOGRAM: ICD-10-CM

## 2025-08-26 DIAGNOSIS — N18.31 STAGE 3A CHRONIC KIDNEY DISEASE (HCC): ICD-10-CM

## 2025-08-26 DIAGNOSIS — I10 PRIMARY HYPERTENSION: ICD-10-CM

## 2025-08-26 LAB
ALBUMIN SERPL-MCNC: 3.8 G/DL (ref 3.5–5.2)
ALBUMIN/GLOB SERPL: 1.3 (ref 1.1–2.2)
ALP SERPL-CCNC: 71 U/L (ref 35–104)
ALT SERPL-CCNC: 13 U/L (ref 10–35)
ANION GAP SERPL CALC-SCNC: 13 MMOL/L (ref 2–14)
AST SERPL-CCNC: 23 U/L (ref 10–35)
BILIRUB SERPL-MCNC: 0.4 MG/DL (ref 0–1.2)
BUN SERPL-MCNC: 17 MG/DL (ref 8–23)
CALCIUM SERPL-MCNC: 9.8 MG/DL (ref 8.8–10.2)
CHLORIDE SERPL-SCNC: 89 MMOL/L (ref 98–107)
CO2 SERPL-SCNC: 23 MMOL/L (ref 20–29)
CREAT SERPL-MCNC: 1.66 MG/DL (ref 0.6–1)
ERYTHROCYTE [DISTWIDTH] IN BLOOD BY AUTOMATED COUNT: 12.3 % (ref 11.5–14.5)
GLOBULIN SER CALC-MCNC: 3 G/DL (ref 2–4)
GLUCOSE SERPL-MCNC: 104 MG/DL (ref 65–100)
HCT VFR BLD AUTO: 30.4 % (ref 35–47)
HGB BLD-MCNC: 10.1 G/DL (ref 11.5–16)
MCH RBC QN AUTO: 29.3 PG (ref 26–34)
MCHC RBC AUTO-ENTMCNC: 33.2 G/DL (ref 30–36.5)
MCV RBC AUTO: 88.1 FL (ref 80–99)
NRBC # BLD: 0 K/UL (ref 0–0.01)
NRBC BLD-RTO: 0 PER 100 WBC
PLATELET # BLD AUTO: 276 K/UL (ref 150–400)
PMV BLD AUTO: 8.6 FL (ref 8.9–12.9)
POTASSIUM SERPL-SCNC: 4.8 MMOL/L (ref 3.5–5.1)
PROT SERPL-MCNC: 6.8 G/DL (ref 6.4–8.3)
RBC # BLD AUTO: 3.45 M/UL (ref 3.8–5.2)
SODIUM SERPL-SCNC: 125 MMOL/L (ref 136–145)
T4 FREE SERPL-MCNC: 1.4 NG/DL (ref 0.9–1.6)
TSH, 3RD GENERATION: 2.47 UIU/ML (ref 0.27–4.2)
WBC # BLD AUTO: 8.9 K/UL (ref 3.6–11)

## 2025-08-26 PROCEDURE — 77067 SCR MAMMO BI INCL CAD: CPT

## 2025-09-04 ENCOUNTER — HOSPITAL ENCOUNTER (OUTPATIENT)
Facility: HOSPITAL | Age: 75
Discharge: HOME | End: 2025-09-04
Payer: MEDICARE

## 2025-09-04 ENCOUNTER — TRANSCRIBE ORDERS (OUTPATIENT)
Facility: HOSPITAL | Age: 75
End: 2025-09-04

## 2025-09-04 DIAGNOSIS — D86.9 SARCOIDOSIS: ICD-10-CM

## 2025-09-04 DIAGNOSIS — D86.9 SARCOIDOSIS: Primary | ICD-10-CM

## 2025-09-04 PROCEDURE — 71046 X-RAY EXAM CHEST 2 VIEWS: CPT

## (undated) DEVICE — SET ADMIN 16ML TBNG L100IN 2 Y INJ SITE IV PIGGY BK DISP (ORDER IN MULIPLES OF 48)

## (undated) DEVICE — SNARE ENDOSCP M L240CM W27MM SHTH DIA2.4MM CHN 2.8MM OVL

## (undated) DEVICE — ELECTRODE,RADIOTRANSLUCENT,FOAM,5PK: Brand: MEDLINE

## (undated) DEVICE — STRAINER URIN CALC RNL MSH -- CONVERT TO ITEM 357634

## (undated) DEVICE — Z DISCONTINUED PER MEDLINE LINE GAS SAMPLING O2/CO2 LNG AD 13 FT NSL W/ TBNG FILTERLINE

## (undated) DEVICE — Device

## (undated) DEVICE — BASIN EMSIS 16OZ GRAPHITE PLAS KID SHP MOLD GRAD FOR ORAL

## (undated) DEVICE — CONTAINER SPEC 20 ML LID NEUT BUFF FORMALIN 10 % POLYPR STS

## (undated) DEVICE — 1200 GUARD II KIT W/5MM TUBE W/O VAC TUBE: Brand: GUARDIAN

## (undated) DEVICE — SOLIDIFIER FLD 2OZ 1500CC N DISINF IN BTL DISP SAFESORB

## (undated) DEVICE — TOWEL 4 PLY TISS 19X30 SUE WHT

## (undated) DEVICE — NEONATAL-ADULT SPO2 SENSOR: Brand: NELLCOR

## (undated) DEVICE — SYR 3ML LL TIP 1/10ML GRAD --

## (undated) DEVICE — CATH IV AUTOGRD BC PNK 20GA 25 -- INSYTE

## (undated) DEVICE — TRAP,MUCUS SPECIMEN, 80CC: Brand: MEDLINE

## (undated) DEVICE — HYPODERMIC SAFETY NEEDLE: Brand: MAGELLAN

## (undated) DEVICE — SYR 10ML LUER LOK 1/5ML GRAD --